# Patient Record
Sex: FEMALE | Race: WHITE | Employment: OTHER | URBAN - METROPOLITAN AREA
[De-identification: names, ages, dates, MRNs, and addresses within clinical notes are randomized per-mention and may not be internally consistent; named-entity substitution may affect disease eponyms.]

---

## 2017-01-01 ENCOUNTER — IP HISTORICAL/CONVERTED ENCOUNTER (OUTPATIENT)
Dept: OTHER | Age: 82
End: 2017-01-01

## 2017-01-01 ENCOUNTER — APPOINTMENT (OUTPATIENT)
Dept: CT IMAGING | Age: 82
DRG: 291 | End: 2017-01-01
Attending: EMERGENCY MEDICINE
Payer: MEDICARE

## 2017-01-01 ENCOUNTER — HOSPITAL ENCOUNTER (INPATIENT)
Age: 82
LOS: 2 days | DRG: 871 | End: 2017-06-30
Attending: EMERGENCY MEDICINE | Admitting: INTERNAL MEDICINE
Payer: MEDICARE

## 2017-01-01 ENCOUNTER — HOSPITAL ENCOUNTER (INPATIENT)
Age: 82
LOS: 3 days | Discharge: HOME HEALTH | DRG: 812 | End: 2017-06-01
Attending: EMERGENCY MEDICINE | Admitting: INTERNAL MEDICINE
Payer: MEDICARE

## 2017-01-01 ENCOUNTER — APPOINTMENT (OUTPATIENT)
Dept: GENERAL RADIOLOGY | Age: 82
DRG: 291 | End: 2017-01-01
Attending: EMERGENCY MEDICINE
Payer: MEDICARE

## 2017-01-01 ENCOUNTER — TELEPHONE (OUTPATIENT)
Dept: CASE MANAGEMENT | Age: 82
End: 2017-01-01

## 2017-01-01 ENCOUNTER — OP HISTORICAL/CONVERTED ENCOUNTER (OUTPATIENT)
Dept: OTHER | Age: 82
End: 2017-01-01

## 2017-01-01 ENCOUNTER — HOSPITAL ENCOUNTER (INPATIENT)
Age: 82
LOS: 2 days | Discharge: HOME | DRG: 291 | End: 2017-03-06
Attending: EMERGENCY MEDICINE | Admitting: INTERNAL MEDICINE
Payer: MEDICARE

## 2017-01-01 ENCOUNTER — APPOINTMENT (OUTPATIENT)
Dept: CT IMAGING | Age: 82
DRG: 871 | End: 2017-01-01
Attending: EMERGENCY MEDICINE
Payer: MEDICARE

## 2017-01-01 ENCOUNTER — APPOINTMENT (OUTPATIENT)
Dept: GENERAL RADIOLOGY | Age: 82
DRG: 871 | End: 2017-01-01
Attending: INTERNAL MEDICINE
Payer: MEDICARE

## 2017-01-01 ENCOUNTER — TELEPHONE (OUTPATIENT)
Dept: ONCOLOGY | Age: 82
End: 2017-01-01

## 2017-01-01 ENCOUNTER — APPOINTMENT (OUTPATIENT)
Dept: ULTRASOUND IMAGING | Age: 82
DRG: 871 | End: 2017-01-01
Attending: INTERNAL MEDICINE
Payer: MEDICARE

## 2017-01-01 ENCOUNTER — APPOINTMENT (OUTPATIENT)
Dept: GENERAL RADIOLOGY | Age: 82
DRG: 871 | End: 2017-01-01
Attending: EMERGENCY MEDICINE
Payer: MEDICARE

## 2017-01-01 ENCOUNTER — APPOINTMENT (OUTPATIENT)
Dept: GENERAL RADIOLOGY | Age: 82
DRG: 812 | End: 2017-01-01
Attending: EMERGENCY MEDICINE
Payer: MEDICARE

## 2017-01-01 VITALS
WEIGHT: 101.1 LBS | OXYGEN SATURATION: 100 % | HEART RATE: 63 BPM | HEIGHT: 60 IN | SYSTOLIC BLOOD PRESSURE: 140 MMHG | RESPIRATION RATE: 17 BRPM | DIASTOLIC BLOOD PRESSURE: 69 MMHG | TEMPERATURE: 97.4 F | BODY MASS INDEX: 19.85 KG/M2

## 2017-01-01 VITALS
WEIGHT: 105 LBS | DIASTOLIC BLOOD PRESSURE: 65 MMHG | HEART RATE: 86 BPM | TEMPERATURE: 97.7 F | RESPIRATION RATE: 18 BRPM | BODY MASS INDEX: 21.17 KG/M2 | SYSTOLIC BLOOD PRESSURE: 136 MMHG | OXYGEN SATURATION: 94 % | HEIGHT: 59 IN

## 2017-01-01 VITALS
HEIGHT: 59 IN | SYSTOLIC BLOOD PRESSURE: 87 MMHG | WEIGHT: 105.82 LBS | HEART RATE: 106 BPM | RESPIRATION RATE: 14 BRPM | OXYGEN SATURATION: 98 % | TEMPERATURE: 97.5 F | DIASTOLIC BLOOD PRESSURE: 45 MMHG | BODY MASS INDEX: 21.33 KG/M2

## 2017-01-01 DIAGNOSIS — E87.1 HYPONATREMIA: ICD-10-CM

## 2017-01-01 DIAGNOSIS — D69.6 THROMBOCYTOPENIA (HCC): ICD-10-CM

## 2017-01-01 DIAGNOSIS — N39.0 URINARY TRACT INFECTION WITHOUT HEMATURIA, SITE UNSPECIFIED: ICD-10-CM

## 2017-01-01 DIAGNOSIS — R06.02 SOB (SHORTNESS OF BREATH): ICD-10-CM

## 2017-01-01 DIAGNOSIS — D64.9 ANEMIA, UNSPECIFIED TYPE: ICD-10-CM

## 2017-01-01 DIAGNOSIS — R65.21 SEPTIC SHOCK (HCC): ICD-10-CM

## 2017-01-01 DIAGNOSIS — A41.9 SEPSIS, DUE TO UNSPECIFIED ORGANISM: Primary | ICD-10-CM

## 2017-01-01 DIAGNOSIS — R53.81 DEBILITY: ICD-10-CM

## 2017-01-01 DIAGNOSIS — A41.9 SEPTIC SHOCK (HCC): ICD-10-CM

## 2017-01-01 DIAGNOSIS — Z71.89 GOALS OF CARE, COUNSELING/DISCUSSION: ICD-10-CM

## 2017-01-01 DIAGNOSIS — N17.9 ACUTE RENAL FAILURE, UNSPECIFIED ACUTE RENAL FAILURE TYPE (HCC): Primary | ICD-10-CM

## 2017-01-01 DIAGNOSIS — I50.9 CONGESTIVE HEART FAILURE, UNSPECIFIED CONGESTIVE HEART FAILURE CHRONICITY, UNSPECIFIED CONGESTIVE HEART FAILURE TYPE: Primary | ICD-10-CM

## 2017-01-01 LAB
ABO + RH BLD: NORMAL
ABO + RH BLD: NORMAL
ALBUMIN SERPL BCP-MCNC: 2.9 G/DL (ref 3.5–5)
ALBUMIN SERPL BCP-MCNC: 3.2 G/DL (ref 3.5–5)
ALBUMIN SERPL BCP-MCNC: 3.3 G/DL (ref 3.5–5)
ALBUMIN SERPL BCP-MCNC: 3.5 G/DL (ref 3.5–5)
ALBUMIN/GLOB SERPL: 0.8 {RATIO} (ref 1.1–2.2)
ALBUMIN/GLOB SERPL: 0.9 {RATIO} (ref 1.1–2.2)
ALBUMIN/GLOB SERPL: 1 {RATIO} (ref 1.1–2.2)
ALBUMIN/GLOB SERPL: 1.1 {RATIO} (ref 1.1–2.2)
ALP SERPL-CCNC: 129 U/L (ref 45–117)
ALP SERPL-CCNC: 234 U/L (ref 45–117)
ALP SERPL-CCNC: 80 U/L (ref 45–117)
ALP SERPL-CCNC: 98 U/L (ref 45–117)
ALT SERPL-CCNC: 144 U/L (ref 12–78)
ALT SERPL-CCNC: 16 U/L (ref 12–78)
ALT SERPL-CCNC: 237 U/L (ref 12–78)
ALT SERPL-CCNC: 58 U/L (ref 12–78)
AMMONIA PLAS-SCNC: 11 UMOL/L
AMORPH CRY URNS QL MICRO: ABNORMAL
ANION GAP BLD CALC-SCNC: 10 MMOL/L (ref 5–15)
ANION GAP BLD CALC-SCNC: 10 MMOL/L (ref 5–15)
ANION GAP BLD CALC-SCNC: 11 MMOL/L (ref 5–15)
ANION GAP BLD CALC-SCNC: 11 MMOL/L (ref 5–15)
ANION GAP BLD CALC-SCNC: 17 MMOL/L (ref 5–15)
ANION GAP BLD CALC-SCNC: 17 MMOL/L (ref 5–15)
ANION GAP BLD CALC-SCNC: 6 MMOL/L (ref 5–15)
ANION GAP BLD CALC-SCNC: 9 MMOL/L (ref 5–15)
ANION GAP BLD CALC-SCNC: 9 MMOL/L (ref 5–15)
APPEARANCE UR: ABNORMAL
APPEARANCE UR: CLEAR
APPEARANCE UR: CLEAR
APTT PPP: 28.9 SEC (ref 22.1–32.5)
APTT PPP: 37.4 SEC (ref 22.1–32.5)
AST SERPL W P-5'-P-CCNC: 146 U/L (ref 15–37)
AST SERPL W P-5'-P-CCNC: 19 U/L (ref 15–37)
AST SERPL W P-5'-P-CCNC: 270 U/L (ref 15–37)
AST SERPL W P-5'-P-CCNC: 69 U/L (ref 15–37)
ATRIAL RATE: 32 BPM
ATRIAL RATE: 63 BPM
ATRIAL RATE: 70 BPM
BACTERIA SPEC CULT: ABNORMAL
BACTERIA SPEC CULT: NORMAL
BACTERIA URNS QL MICRO: ABNORMAL /HPF
BACTERIA URNS QL MICRO: ABNORMAL /HPF
BACTERIA URNS QL MICRO: NEGATIVE /HPF
BASOPHILS # BLD AUTO: 0 K/UL
BASOPHILS # BLD AUTO: 0 K/UL (ref 0–0.1)
BASOPHILS # BLD AUTO: 0.1 K/UL
BASOPHILS # BLD AUTO: 0.1 K/UL (ref 0–0.1)
BASOPHILS # BLD: 0 %
BASOPHILS # BLD: 0 % (ref 0–1)
BASOPHILS # BLD: 1 %
BASOPHILS # BLD: 1 % (ref 0–1)
BILIRUB DIRECT SERPL-MCNC: 0.2 MG/DL (ref 0–0.2)
BILIRUB DIRECT SERPL-MCNC: 1 MG/DL (ref 0–0.2)
BILIRUB SERPL-MCNC: 0.2 MG/DL (ref 0.2–1)
BILIRUB SERPL-MCNC: 0.5 MG/DL (ref 0.2–1)
BILIRUB SERPL-MCNC: 0.9 MG/DL (ref 0.2–1)
BILIRUB SERPL-MCNC: 1.4 MG/DL (ref 0.2–1)
BILIRUB UR QL: NEGATIVE
BLD PROD TYP BPU: NORMAL
BLOOD GROUP ANTIBODIES SERPL: NORMAL
BLOOD GROUP ANTIBODIES SERPL: NORMAL
BNP SERPL-MCNC: 6450 PG/ML (ref 0–450)
BNP SERPL-MCNC: ABNORMAL PG/ML (ref 0–450)
BPU ID: NORMAL
BUN SERPL-MCNC: 28 MG/DL (ref 6–20)
BUN SERPL-MCNC: 30 MG/DL (ref 6–20)
BUN SERPL-MCNC: 30 MG/DL (ref 6–20)
BUN SERPL-MCNC: 60 MG/DL (ref 6–20)
BUN SERPL-MCNC: 61 MG/DL (ref 6–20)
BUN SERPL-MCNC: 70 MG/DL (ref 6–20)
BUN SERPL-MCNC: 74 MG/DL (ref 6–20)
BUN SERPL-MCNC: 75 MG/DL (ref 6–20)
BUN SERPL-MCNC: 77 MG/DL (ref 6–20)
BUN/CREAT SERPL: 14 (ref 12–20)
BUN/CREAT SERPL: 14 (ref 12–20)
BUN/CREAT SERPL: 15 (ref 12–20)
BUN/CREAT SERPL: 20 (ref 12–20)
BUN/CREAT SERPL: 20 (ref 12–20)
BUN/CREAT SERPL: 33 (ref 12–20)
BUN/CREAT SERPL: 35 (ref 12–20)
BUN/CREAT SERPL: 35 (ref 12–20)
BUN/CREAT SERPL: 36 (ref 12–20)
CALCIUM SERPL-MCNC: 8.5 MG/DL (ref 8.5–10.1)
CALCIUM SERPL-MCNC: 8.6 MG/DL (ref 8.5–10.1)
CALCIUM SERPL-MCNC: 8.7 MG/DL (ref 8.5–10.1)
CALCIUM SERPL-MCNC: 8.9 MG/DL (ref 8.5–10.1)
CALCIUM SERPL-MCNC: 9.1 MG/DL (ref 8.5–10.1)
CALCIUM SERPL-MCNC: 9.1 MG/DL (ref 8.5–10.1)
CALCIUM SERPL-MCNC: 9.2 MG/DL (ref 8.5–10.1)
CALCIUM SERPL-MCNC: 9.2 MG/DL (ref 8.5–10.1)
CALCIUM SERPL-MCNC: 9.3 MG/DL (ref 8.5–10.1)
CALCULATED R AXIS, ECG10: -32 DEGREES
CALCULATED R AXIS, ECG10: -34 DEGREES
CALCULATED R AXIS, ECG10: 44 DEGREES
CALCULATED T AXIS, ECG11: 149 DEGREES
CALCULATED T AXIS, ECG11: 161 DEGREES
CALCULATED T AXIS, ECG11: 174 DEGREES
CC UR VC: ABNORMAL
CC UR VC: NORMAL
CHLORIDE SERPL-SCNC: 100 MMOL/L (ref 97–108)
CHLORIDE SERPL-SCNC: 91 MMOL/L (ref 97–108)
CHLORIDE SERPL-SCNC: 92 MMOL/L (ref 97–108)
CHLORIDE SERPL-SCNC: 94 MMOL/L (ref 97–108)
CHLORIDE SERPL-SCNC: 95 MMOL/L (ref 97–108)
CHLORIDE SERPL-SCNC: 96 MMOL/L (ref 97–108)
CHLORIDE SERPL-SCNC: 96 MMOL/L (ref 97–108)
CHLORIDE SERPL-SCNC: 98 MMOL/L (ref 97–108)
CHLORIDE SERPL-SCNC: 99 MMOL/L (ref 97–108)
CK MB CFR SERPL CALC: 11.4 % (ref 0–2.5)
CK MB SERPL-MCNC: 3.2 NG/ML (ref 5–25)
CK SERPL-CCNC: 132 U/L (ref 26–192)
CK SERPL-CCNC: 21 U/L (ref 26–192)
CK SERPL-CCNC: 22 U/L (ref 26–192)
CK SERPL-CCNC: 28 U/L (ref 26–192)
CK SERPL-CCNC: 93 U/L (ref 26–192)
CO2 SERPL-SCNC: 19 MMOL/L (ref 21–32)
CO2 SERPL-SCNC: 20 MMOL/L (ref 21–32)
CO2 SERPL-SCNC: 23 MMOL/L (ref 21–32)
CO2 SERPL-SCNC: 24 MMOL/L (ref 21–32)
CO2 SERPL-SCNC: 27 MMOL/L (ref 21–32)
CO2 SERPL-SCNC: 28 MMOL/L (ref 21–32)
CO2 SERPL-SCNC: 31 MMOL/L (ref 21–32)
COLOR UR: ABNORMAL
COLOR UR: ABNORMAL
COLOR UR: NORMAL
CORTIS AM PEAK SERPL-MCNC: 47.9 UG/DL (ref 4.3–22.4)
CORTIS SERPL-MCNC: 58.8 UG/DL
CREAT SERPL-MCNC: 1.95 MG/DL (ref 0.55–1.02)
CREAT SERPL-MCNC: 1.97 MG/DL (ref 0.55–1.02)
CREAT SERPL-MCNC: 1.98 MG/DL (ref 0.55–1.02)
CREAT SERPL-MCNC: 2.12 MG/DL (ref 0.55–1.02)
CREAT SERPL-MCNC: 2.12 MG/DL (ref 0.55–1.02)
CREAT SERPL-MCNC: 2.13 MG/DL (ref 0.55–1.02)
CREAT SERPL-MCNC: 2.24 MG/DL (ref 0.55–1.02)
CREAT SERPL-MCNC: 3.07 MG/DL (ref 0.55–1.02)
CREAT SERPL-MCNC: 3.1 MG/DL (ref 0.55–1.02)
CREAT UR-MCNC: 118 MG/DL
CREAT UR-MCNC: 37.6 MG/DL
CROSSMATCH RESULT,%XM: NORMAL
DIAGNOSIS, 93000: NORMAL
DIFFERENTIAL METHOD BLD: ABNORMAL
EOSINOPHIL # BLD: 0 K/UL
EOSINOPHIL # BLD: 0 K/UL
EOSINOPHIL # BLD: 0 K/UL (ref 0–0.4)
EOSINOPHIL # BLD: 0.1 K/UL (ref 0–0.4)
EOSINOPHIL NFR BLD: 0 %
EOSINOPHIL NFR BLD: 0 %
EOSINOPHIL NFR BLD: 0 % (ref 0–7)
EOSINOPHIL NFR BLD: 1 % (ref 0–7)
EPITH CASTS URNS QL MICRO: ABNORMAL /LPF
EPITH CASTS URNS QL MICRO: ABNORMAL /LPF
EPITH CASTS URNS QL MICRO: NORMAL /LPF
ERYTHROCYTE [DISTWIDTH] IN BLOOD BY AUTOMATED COUNT: 16.5 % (ref 11.5–14.5)
ERYTHROCYTE [DISTWIDTH] IN BLOOD BY AUTOMATED COUNT: 17 % (ref 11.5–14.5)
ERYTHROCYTE [DISTWIDTH] IN BLOOD BY AUTOMATED COUNT: 17.1 % (ref 11.5–14.5)
ERYTHROCYTE [DISTWIDTH] IN BLOOD BY AUTOMATED COUNT: 17.2 % (ref 11.5–14.5)
ERYTHROCYTE [DISTWIDTH] IN BLOOD BY AUTOMATED COUNT: 17.5 % (ref 11.5–14.5)
ERYTHROCYTE [DISTWIDTH] IN BLOOD BY AUTOMATED COUNT: 18 % (ref 11.5–14.5)
ERYTHROCYTE [DISTWIDTH] IN BLOOD BY AUTOMATED COUNT: 18.3 % (ref 11.5–14.5)
ERYTHROCYTE [DISTWIDTH] IN BLOOD BY AUTOMATED COUNT: 18.5 % (ref 11.5–14.5)
ERYTHROCYTE [DISTWIDTH] IN BLOOD BY AUTOMATED COUNT: 18.7 % (ref 11.5–14.5)
EST. AVERAGE GLUCOSE BLD GHB EST-MCNC: 105 MG/DL
FERRITIN SERPL-MCNC: 130 NG/ML (ref 8–252)
FERRITIN SERPL-MCNC: 68 NG/ML (ref 8–252)
FIBRINOGEN PPP-MCNC: 242 MG/DL (ref 200–475)
FIBRINOGEN PPP-MCNC: 316 MG/DL (ref 200–475)
FOLATE SERPL-MCNC: 8.6 NG/ML (ref 5–21)
GLOBULIN SER CALC-MCNC: 3 G/DL (ref 2–4)
GLOBULIN SER CALC-MCNC: 3.2 G/DL (ref 2–4)
GLOBULIN SER CALC-MCNC: 3.4 G/DL (ref 2–4)
GLOBULIN SER CALC-MCNC: 4.2 G/DL (ref 2–4)
GLUCOSE BLD STRIP.AUTO-MCNC: 104 MG/DL (ref 65–100)
GLUCOSE BLD STRIP.AUTO-MCNC: 142 MG/DL (ref 65–100)
GLUCOSE BLD STRIP.AUTO-MCNC: 41 MG/DL (ref 65–100)
GLUCOSE BLD STRIP.AUTO-MCNC: 45 MG/DL (ref 65–100)
GLUCOSE SERPL-MCNC: 103 MG/DL (ref 65–100)
GLUCOSE SERPL-MCNC: 107 MG/DL (ref 65–100)
GLUCOSE SERPL-MCNC: 130 MG/DL (ref 65–100)
GLUCOSE SERPL-MCNC: 134 MG/DL (ref 65–100)
GLUCOSE SERPL-MCNC: 168 MG/DL (ref 65–100)
GLUCOSE SERPL-MCNC: 223 MG/DL (ref 65–100)
GLUCOSE SERPL-MCNC: 83 MG/DL (ref 65–100)
GLUCOSE SERPL-MCNC: 91 MG/DL (ref 65–100)
GLUCOSE SERPL-MCNC: 93 MG/DL (ref 65–100)
GLUCOSE UR STRIP.AUTO-MCNC: NEGATIVE MG/DL
HAPTOGLOB SERPL-MCNC: 106 MG/DL (ref 34–200)
HAPTOGLOB SERPL-MCNC: 116 MG/DL (ref 30–200)
HBA1C MFR BLD: 5.3 % (ref 4.2–6.3)
HCT VFR BLD AUTO: 20.4 % (ref 35–47)
HCT VFR BLD AUTO: 20.9 % (ref 35–47)
HCT VFR BLD AUTO: 21.2 % (ref 35–47)
HCT VFR BLD AUTO: 24.4 % (ref 35–47)
HCT VFR BLD AUTO: 25.2 % (ref 35–47)
HCT VFR BLD AUTO: 26.7 % (ref 35–47)
HCT VFR BLD AUTO: 26.8 % (ref 35–47)
HCT VFR BLD AUTO: 27 % (ref 35–47)
HCT VFR BLD AUTO: 27.9 % (ref 35–47)
HCV AB SERPL QL IA: NONREACTIVE
HCV COMMENT,HCGAC: NORMAL
HGB BLD-MCNC: 6.5 G/DL (ref 11.5–16)
HGB BLD-MCNC: 6.8 G/DL (ref 11.5–16)
HGB BLD-MCNC: 7.3 G/DL (ref 11.5–16)
HGB BLD-MCNC: 8.1 G/DL (ref 11.5–16)
HGB BLD-MCNC: 8.2 G/DL (ref 11.5–16)
HGB BLD-MCNC: 8.5 G/DL (ref 11.5–16)
HGB BLD-MCNC: 8.7 G/DL (ref 11.5–16)
HGB BLD-MCNC: 8.9 G/DL (ref 11.5–16)
HGB BLD-MCNC: 9.1 G/DL (ref 11.5–16)
HGB UR QL STRIP: ABNORMAL
HGB UR QL STRIP: NEGATIVE
HGB UR QL STRIP: NEGATIVE
INR BLD: 1.2 (ref 0.9–1.2)
INR PPP: 1.2 (ref 0.9–1.1)
INR PPP: 1.8 (ref 0.9–1.1)
IRON SATN MFR SERPL: 20 % (ref 20–50)
IRON SATN MFR SERPL: 49 % (ref 20–50)
IRON SERPL-MCNC: 181 UG/DL (ref 35–150)
IRON SERPL-MCNC: 69 UG/DL (ref 35–150)
KETONES UR QL STRIP.AUTO: NEGATIVE MG/DL
LACTATE SERPL-SCNC: 4.2 MMOL/L (ref 0.4–2)
LACTATE SERPL-SCNC: 5.8 MMOL/L (ref 0.4–2)
LACTATE SERPL-SCNC: 7 MMOL/L (ref 0.4–2)
LDH SERPL L TO P-CCNC: 216 U/L (ref 81–246)
LDH SERPL L TO P-CCNC: 337 U/L (ref 81–246)
LEUKOCYTE ESTERASE UR QL STRIP.AUTO: ABNORMAL
LEUKOCYTE ESTERASE UR QL STRIP.AUTO: NEGATIVE
LEUKOCYTE ESTERASE UR QL STRIP.AUTO: NEGATIVE
LYMPHOCYTES # BLD AUTO: 11 % (ref 12–49)
LYMPHOCYTES # BLD AUTO: 20 % (ref 12–49)
LYMPHOCYTES # BLD AUTO: 24 % (ref 12–49)
LYMPHOCYTES # BLD AUTO: 25 %
LYMPHOCYTES # BLD AUTO: 30 % (ref 12–49)
LYMPHOCYTES # BLD AUTO: 31 % (ref 12–49)
LYMPHOCYTES # BLD AUTO: 4 %
LYMPHOCYTES # BLD AUTO: 8 % (ref 12–49)
LYMPHOCYTES # BLD: 0.2 K/UL
LYMPHOCYTES # BLD: 0.5 K/UL (ref 0.8–3.5)
LYMPHOCYTES # BLD: 0.8 K/UL (ref 0.8–3.5)
LYMPHOCYTES # BLD: 0.9 K/UL (ref 0.8–3.5)
LYMPHOCYTES # BLD: 1 K/UL (ref 0.8–3.5)
LYMPHOCYTES # BLD: 1.3 K/UL
LYMPHOCYTES # BLD: 1.4 K/UL (ref 0.8–3.5)
LYMPHOCYTES # BLD: 1.6 K/UL (ref 0.8–3.5)
MAGNESIUM SERPL-MCNC: 1.9 MG/DL (ref 1.6–2.4)
MAGNESIUM SERPL-MCNC: 2 MG/DL (ref 1.6–2.4)
MAGNESIUM SERPL-MCNC: 2.6 MG/DL (ref 1.6–2.4)
MAGNESIUM SERPL-MCNC: 2.6 MG/DL (ref 1.6–2.4)
MCH RBC QN AUTO: 27.5 PG (ref 26–34)
MCH RBC QN AUTO: 28 PG (ref 26–34)
MCH RBC QN AUTO: 28.2 PG (ref 26–34)
MCH RBC QN AUTO: 28.3 PG (ref 26–34)
MCH RBC QN AUTO: 28.5 PG (ref 26–34)
MCH RBC QN AUTO: 28.6 PG (ref 26–34)
MCH RBC QN AUTO: 29 PG (ref 26–34)
MCH RBC QN AUTO: 29 PG (ref 26–34)
MCH RBC QN AUTO: 29.8 PG (ref 26–34)
MCHC RBC AUTO-ENTMCNC: 31.1 G/DL (ref 30–36.5)
MCHC RBC AUTO-ENTMCNC: 31.5 G/DL (ref 30–36.5)
MCHC RBC AUTO-ENTMCNC: 32.5 G/DL (ref 30–36.5)
MCHC RBC AUTO-ENTMCNC: 32.6 G/DL (ref 30–36.5)
MCHC RBC AUTO-ENTMCNC: 32.6 G/DL (ref 30–36.5)
MCHC RBC AUTO-ENTMCNC: 33.2 G/DL (ref 30–36.5)
MCHC RBC AUTO-ENTMCNC: 33.2 G/DL (ref 30–36.5)
MCHC RBC AUTO-ENTMCNC: 33.3 G/DL (ref 30–36.5)
MCHC RBC AUTO-ENTMCNC: 34.4 G/DL (ref 30–36.5)
MCV RBC AUTO: 84.1 FL (ref 80–99)
MCV RBC AUTO: 85 FL (ref 80–99)
MCV RBC AUTO: 86.9 FL (ref 80–99)
MCV RBC AUTO: 87.3 FL (ref 80–99)
MCV RBC AUTO: 87.4 FL (ref 80–99)
MCV RBC AUTO: 87.5 FL (ref 80–99)
MCV RBC AUTO: 87.8 FL (ref 80–99)
MCV RBC AUTO: 89.5 FL (ref 80–99)
MCV RBC AUTO: 90.1 FL (ref 80–99)
MONOCYTES # BLD: 0.4 K/UL
MONOCYTES # BLD: 0.7 K/UL
MONOCYTES # BLD: 0.7 K/UL (ref 0–1)
MONOCYTES # BLD: 0.8 K/UL (ref 0–1)
MONOCYTES # BLD: 0.9 K/UL (ref 0–1)
MONOCYTES # BLD: 1.1 K/UL (ref 0–1)
MONOCYTES NFR BLD AUTO: 12 % (ref 5–13)
MONOCYTES NFR BLD AUTO: 13 %
MONOCYTES NFR BLD AUTO: 15 % (ref 5–13)
MONOCYTES NFR BLD AUTO: 15 % (ref 5–13)
MONOCYTES NFR BLD AUTO: 19 % (ref 5–13)
MONOCYTES NFR BLD AUTO: 20 % (ref 5–13)
MONOCYTES NFR BLD AUTO: 21 % (ref 5–13)
MONOCYTES NFR BLD AUTO: 8 %
NEUTS BAND NFR BLD MANUAL: 1 %
NEUTS BAND NFR BLD MANUAL: 2 % (ref 0–6)
NEUTS SEG # BLD: 2.4 K/UL (ref 1.8–8)
NEUTS SEG # BLD: 2.4 K/UL (ref 1.8–8)
NEUTS SEG # BLD: 2.6 K/UL (ref 1.8–8)
NEUTS SEG # BLD: 2.7 K/UL (ref 1.8–8)
NEUTS SEG # BLD: 3.1 K/UL
NEUTS SEG # BLD: 4.8 K/UL (ref 1.8–8)
NEUTS SEG # BLD: 4.9 K/UL
NEUTS SEG # BLD: 5.5 K/UL (ref 1.8–8)
NEUTS SEG NFR BLD AUTO: 49 % (ref 32–75)
NEUTS SEG NFR BLD AUTO: 53 % (ref 32–75)
NEUTS SEG NFR BLD AUTO: 56 % (ref 32–75)
NEUTS SEG NFR BLD AUTO: 57 % (ref 32–75)
NEUTS SEG NFR BLD AUTO: 61 %
NEUTS SEG NFR BLD AUTO: 76 % (ref 32–75)
NEUTS SEG NFR BLD AUTO: 77 % (ref 32–75)
NEUTS SEG NFR BLD AUTO: 87 %
NITRITE UR QL STRIP.AUTO: NEGATIVE
NITRITE UR QL STRIP.AUTO: NEGATIVE
NITRITE UR QL STRIP.AUTO: POSITIVE
NRBC BLD-RTO: 1 PER 100 WBC
NRBC BLD-RTO: 3 PER 100 WBC
P-R INTERVAL, ECG05: 224 MS
P-R INTERVAL, ECG05: 284 MS
PATH REV BLD -IMP: ABNORMAL
PERIPHERAL SMEAR,PSM: NORMAL
PH UR STRIP: 5.5 [PH] (ref 5–8)
PH UR STRIP: 6 [PH] (ref 5–8)
PH UR STRIP: 6.5 [PH] (ref 5–8)
PHOSPHATE SERPL-MCNC: 4.5 MG/DL (ref 2.6–4.7)
PLATELET # BLD AUTO: 109 K/UL (ref 150–400)
PLATELET # BLD AUTO: 139 K/UL (ref 150–400)
PLATELET # BLD AUTO: 140 K/UL (ref 150–400)
PLATELET # BLD AUTO: 145 K/UL (ref 150–400)
PLATELET # BLD AUTO: 23 K/UL (ref 150–400)
PLATELET # BLD AUTO: 29 K/UL (ref 150–400)
PLATELET # BLD AUTO: 69 K/UL (ref 150–400)
PLATELET # BLD AUTO: 77 K/UL (ref 150–400)
PLATELET # BLD AUTO: 79 K/UL (ref 150–400)
PLATELET COMMENTS,PCOM: ABNORMAL
POTASSIUM SERPL-SCNC: 3.7 MMOL/L (ref 3.5–5.1)
POTASSIUM SERPL-SCNC: 3.9 MMOL/L (ref 3.5–5.1)
POTASSIUM SERPL-SCNC: 4 MMOL/L (ref 3.5–5.1)
POTASSIUM SERPL-SCNC: 4.1 MMOL/L (ref 3.5–5.1)
POTASSIUM SERPL-SCNC: 4.1 MMOL/L (ref 3.5–5.1)
POTASSIUM SERPL-SCNC: 4.3 MMOL/L (ref 3.5–5.1)
POTASSIUM SERPL-SCNC: 4.3 MMOL/L (ref 3.5–5.1)
POTASSIUM SERPL-SCNC: 4.6 MMOL/L (ref 3.5–5.1)
POTASSIUM SERPL-SCNC: 4.9 MMOL/L (ref 3.5–5.1)
PROT SERPL-MCNC: 6.1 G/DL (ref 6.4–8.2)
PROT SERPL-MCNC: 6.2 G/DL (ref 6.4–8.2)
PROT SERPL-MCNC: 6.9 G/DL (ref 6.4–8.2)
PROT SERPL-MCNC: 7.5 G/DL (ref 6.4–8.2)
PROT UR STRIP-MCNC: 100 MG/DL
PROT UR STRIP-MCNC: >300 MG/DL
PROT UR STRIP-MCNC: NEGATIVE MG/DL
PROTHROMBIN TIME: 12 SEC (ref 9–11.1)
PROTHROMBIN TIME: 17.8 SEC (ref 9–11.1)
Q-T INTERVAL, ECG07: 422 MS
Q-T INTERVAL, ECG07: 508 MS
Q-T INTERVAL, ECG07: 524 MS
QRS DURATION, ECG06: 124 MS
QRS DURATION, ECG06: 160 MS
QRS DURATION, ECG06: 174 MS
QTC CALCULATION (BEZET), ECG08: 448 MS
QTC CALCULATION (BEZET), ECG08: 519 MS
QTC CALCULATION (BEZET), ECG08: 524 MS
RBC # BLD AUTO: 2.28 M/UL (ref 3.8–5.2)
RBC # BLD AUTO: 2.32 M/UL (ref 3.8–5.2)
RBC # BLD AUTO: 2.52 M/UL (ref 3.8–5.2)
RBC # BLD AUTO: 2.87 M/UL (ref 3.8–5.2)
RBC # BLD AUTO: 2.9 M/UL (ref 3.8–5.2)
RBC # BLD AUTO: 3.04 M/UL (ref 3.8–5.2)
RBC # BLD AUTO: 3.07 M/UL (ref 3.8–5.2)
RBC # BLD AUTO: 3.09 M/UL (ref 3.8–5.2)
RBC # BLD AUTO: 3.19 M/UL (ref 3.8–5.2)
RBC #/AREA URNS HPF: ABNORMAL /HPF (ref 0–5)
RBC #/AREA URNS HPF: ABNORMAL /HPF (ref 0–5)
RBC #/AREA URNS HPF: NORMAL /HPF (ref 0–5)
RBC MORPH BLD: ABNORMAL
RETICS/RBC NFR AUTO: 2.8 % (ref 0.7–2.1)
SERVICE CMNT-IMP: ABNORMAL
SERVICE CMNT-IMP: NORMAL
SODIUM SERPL-SCNC: 127 MMOL/L (ref 136–145)
SODIUM SERPL-SCNC: 128 MMOL/L (ref 136–145)
SODIUM SERPL-SCNC: 130 MMOL/L (ref 136–145)
SODIUM SERPL-SCNC: 130 MMOL/L (ref 136–145)
SODIUM SERPL-SCNC: 131 MMOL/L (ref 136–145)
SODIUM SERPL-SCNC: 133 MMOL/L (ref 136–145)
SODIUM SERPL-SCNC: 133 MMOL/L (ref 136–145)
SODIUM SERPL-SCNC: 134 MMOL/L (ref 136–145)
SODIUM SERPL-SCNC: 135 MMOL/L (ref 136–145)
SODIUM UR-SCNC: 17 MMOL/L
SODIUM UR-SCNC: 19 MMOL/L
SP GR UR REFRACTOMETRY: 1.01 (ref 1–1.03)
SP GR UR REFRACTOMETRY: 1.01 (ref 1–1.03)
SP GR UR REFRACTOMETRY: 1.02 (ref 1–1.03)
SPECIMEN EXP DATE BLD: NORMAL
SPECIMEN EXP DATE BLD: NORMAL
STATUS OF UNIT,%ST: NORMAL
THERAPEUTIC RANGE,PTTT: ABNORMAL SECS (ref 58–77)
THERAPEUTIC RANGE,PTTT: NORMAL SECS (ref 58–77)
TIBC SERPL-MCNC: 341 UG/DL (ref 250–450)
TIBC SERPL-MCNC: 368 UG/DL (ref 250–450)
TROPONIN I SERPL-MCNC: 0.16 NG/ML
TROPONIN I SERPL-MCNC: 0.17 NG/ML
TROPONIN I SERPL-MCNC: 0.25 NG/ML
TROPONIN I SERPL-MCNC: 0.32 NG/ML
TROPONIN I SERPL-MCNC: 0.33 NG/ML
TSH SERPL DL<=0.05 MIU/L-ACNC: 1.63 UIU/ML (ref 0.36–3.74)
TSH SERPL DL<=0.05 MIU/L-ACNC: 2.61 UIU/ML (ref 0.36–3.74)
UA: UC IF INDICATED,UAUC: ABNORMAL
UA: UC IF INDICATED,UAUC: ABNORMAL
UNIT DIVISION, %UDIV: 0
UROBILINOGEN UR QL STRIP.AUTO: 0.2 EU/DL (ref 0.2–1)
VENTRICULAR RATE, ECG03: 60 BPM
VENTRICULAR RATE, ECG03: 63 BPM
VENTRICULAR RATE, ECG03: 68 BPM
VIT B12 SERPL-MCNC: 1635 PG/ML (ref 211–911)
WBC # BLD AUTO: 3.4 K/UL (ref 3.6–11)
WBC # BLD AUTO: 4.2 K/UL (ref 3.6–11)
WBC # BLD AUTO: 4.5 K/UL (ref 3.6–11)
WBC # BLD AUTO: 4.6 K/UL (ref 3.6–11)
WBC # BLD AUTO: 5.2 K/UL (ref 3.6–11)
WBC # BLD AUTO: 5.4 K/UL (ref 3.6–11)
WBC # BLD AUTO: 5.5 K/UL (ref 3.6–11)
WBC # BLD AUTO: 6.2 K/UL (ref 3.6–11)
WBC # BLD AUTO: 7.3 K/UL (ref 3.6–11)
WBC URNS QL MICRO: >100 /HPF (ref 0–4)
WBC URNS QL MICRO: ABNORMAL /HPF (ref 0–4)
WBC URNS QL MICRO: NORMAL /HPF (ref 0–4)

## 2017-01-01 PROCEDURE — 85025 COMPLETE CBC W/AUTO DIFF WBC: CPT | Performed by: INTERNAL MEDICINE

## 2017-01-01 PROCEDURE — 83615 LACTATE (LD) (LDH) ENZYME: CPT | Performed by: INTERNAL MEDICINE

## 2017-01-01 PROCEDURE — 73030 X-RAY EXAM OF SHOULDER: CPT

## 2017-01-01 PROCEDURE — 77030011943

## 2017-01-01 PROCEDURE — 86900 BLOOD TYPING SEROLOGIC ABO: CPT | Performed by: INTERNAL MEDICINE

## 2017-01-01 PROCEDURE — 74011000258 HC RX REV CODE- 258: Performed by: INTERNAL MEDICINE

## 2017-01-01 PROCEDURE — 93005 ELECTROCARDIOGRAM TRACING: CPT

## 2017-01-01 PROCEDURE — 81001 URINALYSIS AUTO W/SCOPE: CPT | Performed by: EMERGENCY MEDICINE

## 2017-01-01 PROCEDURE — 36430 TRANSFUSION BLD/BLD COMPNT: CPT

## 2017-01-01 PROCEDURE — 65660000000 HC RM CCU STEPDOWN

## 2017-01-01 PROCEDURE — 83010 ASSAY OF HAPTOGLOBIN QUANT: CPT | Performed by: INTERNAL MEDICINE

## 2017-01-01 PROCEDURE — 86920 COMPATIBILITY TEST SPIN: CPT | Performed by: EMERGENCY MEDICINE

## 2017-01-01 PROCEDURE — 85384 FIBRINOGEN ACTIVITY: CPT | Performed by: NURSE PRACTITIONER

## 2017-01-01 PROCEDURE — 99285 EMERGENCY DEPT VISIT HI MDM: CPT

## 2017-01-01 PROCEDURE — P9016 RBC LEUKOCYTES REDUCED: HCPCS | Performed by: EMERGENCY MEDICINE

## 2017-01-01 PROCEDURE — 84100 ASSAY OF PHOSPHORUS: CPT | Performed by: INTERNAL MEDICINE

## 2017-01-01 PROCEDURE — 83735 ASSAY OF MAGNESIUM: CPT | Performed by: INTERNAL MEDICINE

## 2017-01-01 PROCEDURE — 80048 BASIC METABOLIC PNL TOTAL CA: CPT | Performed by: INTERNAL MEDICINE

## 2017-01-01 PROCEDURE — 36415 COLL VENOUS BLD VENIPUNCTURE: CPT | Performed by: INTERNAL MEDICINE

## 2017-01-01 PROCEDURE — 77010033678 HC OXYGEN DAILY

## 2017-01-01 PROCEDURE — 83605 ASSAY OF LACTIC ACID: CPT | Performed by: INTERNAL MEDICINE

## 2017-01-01 PROCEDURE — 85610 PROTHROMBIN TIME: CPT | Performed by: EMERGENCY MEDICINE

## 2017-01-01 PROCEDURE — 97165 OT EVAL LOW COMPLEX 30 MIN: CPT | Performed by: OCCUPATIONAL THERAPIST

## 2017-01-01 PROCEDURE — 77030034848

## 2017-01-01 PROCEDURE — 77030010545

## 2017-01-01 PROCEDURE — 87040 BLOOD CULTURE FOR BACTERIA: CPT | Performed by: EMERGENCY MEDICINE

## 2017-01-01 PROCEDURE — 83880 ASSAY OF NATRIURETIC PEPTIDE: CPT | Performed by: INTERNAL MEDICINE

## 2017-01-01 PROCEDURE — 97530 THERAPEUTIC ACTIVITIES: CPT

## 2017-01-01 PROCEDURE — 74011250637 HC RX REV CODE- 250/637: Performed by: INTERNAL MEDICINE

## 2017-01-01 PROCEDURE — 82533 TOTAL CORTISOL: CPT | Performed by: INTERNAL MEDICINE

## 2017-01-01 PROCEDURE — 80076 HEPATIC FUNCTION PANEL: CPT | Performed by: INTERNAL MEDICINE

## 2017-01-01 PROCEDURE — 71010 XR CHEST PORT: CPT

## 2017-01-01 PROCEDURE — 97161 PT EVAL LOW COMPLEX 20 MIN: CPT

## 2017-01-01 PROCEDURE — 76450000000

## 2017-01-01 PROCEDURE — 85610 PROTHROMBIN TIME: CPT

## 2017-01-01 PROCEDURE — 74011000258 HC RX REV CODE- 258: Performed by: EMERGENCY MEDICINE

## 2017-01-01 PROCEDURE — 86923 COMPATIBILITY TEST ELECTRIC: CPT | Performed by: INTERNAL MEDICINE

## 2017-01-01 PROCEDURE — 97163 PT EVAL HIGH COMPLEX 45 MIN: CPT

## 2017-01-01 PROCEDURE — 77030029131 HC ADMN ST IV BLD N DEHP ICUM -B

## 2017-01-01 PROCEDURE — 82550 ASSAY OF CK (CPK): CPT | Performed by: INTERNAL MEDICINE

## 2017-01-01 PROCEDURE — 74011000250 HC RX REV CODE- 250: Performed by: INTERNAL MEDICINE

## 2017-01-01 PROCEDURE — P9016 RBC LEUKOCYTES REDUCED: HCPCS | Performed by: INTERNAL MEDICINE

## 2017-01-01 PROCEDURE — 82570 ASSAY OF URINE CREATININE: CPT | Performed by: INTERNAL MEDICINE

## 2017-01-01 PROCEDURE — 93306 TTE W/DOPPLER COMPLETE: CPT

## 2017-01-01 PROCEDURE — 74011250636 HC RX REV CODE- 250/636: Performed by: INTERNAL MEDICINE

## 2017-01-01 PROCEDURE — 96365 THER/PROPH/DIAG IV INF INIT: CPT

## 2017-01-01 PROCEDURE — 85027 COMPLETE CBC AUTOMATED: CPT | Performed by: INTERNAL MEDICINE

## 2017-01-01 PROCEDURE — 80053 COMPREHEN METABOLIC PANEL: CPT | Performed by: EMERGENCY MEDICINE

## 2017-01-01 PROCEDURE — 82728 ASSAY OF FERRITIN: CPT | Performed by: INTERNAL MEDICINE

## 2017-01-01 PROCEDURE — 82607 VITAMIN B-12: CPT | Performed by: INTERNAL MEDICINE

## 2017-01-01 PROCEDURE — 84443 ASSAY THYROID STIM HORMONE: CPT | Performed by: INTERNAL MEDICINE

## 2017-01-01 PROCEDURE — 30233N1 TRANSFUSION OF NONAUTOLOGOUS RED BLOOD CELLS INTO PERIPHERAL VEIN, PERCUTANEOUS APPROACH: ICD-10-PCS | Performed by: INTERNAL MEDICINE

## 2017-01-01 PROCEDURE — 87086 URINE CULTURE/COLONY COUNT: CPT | Performed by: EMERGENCY MEDICINE

## 2017-01-01 PROCEDURE — 77030027138 HC INCENT SPIROMETER -A

## 2017-01-01 PROCEDURE — 86803 HEPATITIS C AB TEST: CPT | Performed by: NURSE PRACTITIONER

## 2017-01-01 PROCEDURE — 97116 GAIT TRAINING THERAPY: CPT

## 2017-01-01 PROCEDURE — 36415 COLL VENOUS BLD VENIPUNCTURE: CPT | Performed by: EMERGENCY MEDICINE

## 2017-01-01 PROCEDURE — 83540 ASSAY OF IRON: CPT | Performed by: INTERNAL MEDICINE

## 2017-01-01 PROCEDURE — 94762 N-INVAS EAR/PLS OXIMTRY CONT: CPT

## 2017-01-01 PROCEDURE — 74011250636 HC RX REV CODE- 250/636: Performed by: FAMILY MEDICINE

## 2017-01-01 PROCEDURE — 97535 SELF CARE MNGMENT TRAINING: CPT | Performed by: OCCUPATIONAL THERAPIST

## 2017-01-01 PROCEDURE — 85025 COMPLETE CBC W/AUTO DIFF WBC: CPT | Performed by: EMERGENCY MEDICINE

## 2017-01-01 PROCEDURE — 84484 ASSAY OF TROPONIN QUANT: CPT | Performed by: INTERNAL MEDICINE

## 2017-01-01 PROCEDURE — 96374 THER/PROPH/DIAG INJ IV PUSH: CPT

## 2017-01-01 PROCEDURE — 74011250636 HC RX REV CODE- 250/636: Performed by: EMERGENCY MEDICINE

## 2017-01-01 PROCEDURE — 87186 SC STD MICRODIL/AGAR DIL: CPT | Performed by: EMERGENCY MEDICINE

## 2017-01-01 PROCEDURE — 76700 US EXAM ABDOM COMPLETE: CPT

## 2017-01-01 PROCEDURE — 51702 INSERT TEMP BLADDER CATH: CPT

## 2017-01-01 PROCEDURE — 82140 ASSAY OF AMMONIA: CPT | Performed by: INTERNAL MEDICINE

## 2017-01-01 PROCEDURE — 84300 ASSAY OF URINE SODIUM: CPT | Performed by: INTERNAL MEDICINE

## 2017-01-01 PROCEDURE — 84484 ASSAY OF TROPONIN QUANT: CPT | Performed by: EMERGENCY MEDICINE

## 2017-01-01 PROCEDURE — C1758 CATHETER, URETERAL: HCPCS

## 2017-01-01 PROCEDURE — 82962 GLUCOSE BLOOD TEST: CPT

## 2017-01-01 PROCEDURE — 85730 THROMBOPLASTIN TIME PARTIAL: CPT | Performed by: EMERGENCY MEDICINE

## 2017-01-01 PROCEDURE — 99283 EMERGENCY DEPT VISIT LOW MDM: CPT

## 2017-01-01 PROCEDURE — 80048 BASIC METABOLIC PNL TOTAL CA: CPT | Performed by: EMERGENCY MEDICINE

## 2017-01-01 PROCEDURE — 83036 HEMOGLOBIN GLYCOSYLATED A1C: CPT | Performed by: INTERNAL MEDICINE

## 2017-01-01 PROCEDURE — 85384 FIBRINOGEN ACTIVITY: CPT | Performed by: INTERNAL MEDICINE

## 2017-01-01 PROCEDURE — 87077 CULTURE AEROBIC IDENTIFY: CPT | Performed by: EMERGENCY MEDICINE

## 2017-01-01 PROCEDURE — 86900 BLOOD TYPING SEROLOGIC ABO: CPT | Performed by: EMERGENCY MEDICINE

## 2017-01-01 PROCEDURE — 83605 ASSAY OF LACTIC ACID: CPT | Performed by: EMERGENCY MEDICINE

## 2017-01-01 PROCEDURE — 85730 THROMBOPLASTIN TIME PARTIAL: CPT | Performed by: NURSE PRACTITIONER

## 2017-01-01 PROCEDURE — C9113 INJ PANTOPRAZOLE SODIUM, VIA: HCPCS | Performed by: INTERNAL MEDICINE

## 2017-01-01 PROCEDURE — 85610 PROTHROMBIN TIME: CPT | Performed by: NURSE PRACTITIONER

## 2017-01-01 PROCEDURE — 82746 ASSAY OF FOLIC ACID SERUM: CPT | Performed by: INTERNAL MEDICINE

## 2017-01-01 PROCEDURE — 85045 AUTOMATED RETICULOCYTE COUNT: CPT | Performed by: INTERNAL MEDICINE

## 2017-01-01 PROCEDURE — 70450 CT HEAD/BRAIN W/O DYE: CPT

## 2017-01-01 PROCEDURE — 77030032490 HC SLV COMPR SCD KNE COVD -B

## 2017-01-01 PROCEDURE — 51798 US URINE CAPACITY MEASURE: CPT

## 2017-01-01 PROCEDURE — 74011000250 HC RX REV CODE- 250: Performed by: FAMILY MEDICINE

## 2017-01-01 PROCEDURE — 94660 CPAP INITIATION&MGMT: CPT

## 2017-01-01 RX ORDER — CEFDINIR 300 MG/1
300 CAPSULE ORAL 2 TIMES DAILY
Qty: 6 CAP | Refills: 0 | Status: SHIPPED | OUTPATIENT
Start: 2017-01-01 | End: 2017-01-01

## 2017-01-01 RX ORDER — SIMVASTATIN 5 MG/1
10 TABLET, FILM COATED ORAL
Status: DISCONTINUED | OUTPATIENT
Start: 2017-01-01 | End: 2017-01-01 | Stop reason: HOSPADM

## 2017-01-01 RX ORDER — SODIUM CHLORIDE 9 MG/ML
75 INJECTION, SOLUTION INTRAVENOUS CONTINUOUS
Status: DISCONTINUED | OUTPATIENT
Start: 2017-01-01 | End: 2017-01-01

## 2017-01-01 RX ORDER — FUROSEMIDE 10 MG/ML
20 INJECTION INTRAMUSCULAR; INTRAVENOUS 2 TIMES DAILY
Status: DISCONTINUED | OUTPATIENT
Start: 2017-01-01 | End: 2017-01-01

## 2017-01-01 RX ORDER — MESALAMINE 400 MG/1
400 CAPSULE, DELAYED RELEASE ORAL
Status: DISCONTINUED | OUTPATIENT
Start: 2017-01-01 | End: 2017-01-01 | Stop reason: HOSPADM

## 2017-01-01 RX ORDER — SODIUM CHLORIDE 0.9 % (FLUSH) 0.9 %
5-10 SYRINGE (ML) INJECTION AS NEEDED
Status: DISCONTINUED | OUTPATIENT
Start: 2017-01-01 | End: 2017-01-01 | Stop reason: HOSPADM

## 2017-01-01 RX ORDER — METOPROLOL SUCCINATE 50 MG/1
50 TABLET, EXTENDED RELEASE ORAL DAILY
Status: DISCONTINUED | OUTPATIENT
Start: 2017-01-01 | End: 2017-01-01

## 2017-01-01 RX ORDER — MESALAMINE 400 MG/1
400 CAPSULE, DELAYED RELEASE ORAL
COMMUNITY

## 2017-01-01 RX ORDER — GLYCOPYRROLATE 0.2 MG/ML
0.2 INJECTION INTRAMUSCULAR; INTRAVENOUS
Status: COMPLETED | OUTPATIENT
Start: 2017-01-01 | End: 2017-01-01

## 2017-01-01 RX ORDER — DEXTROSE 50 % IN WATER (D50W) INTRAVENOUS SYRINGE
12.5-25 AS NEEDED
Status: DISCONTINUED | OUTPATIENT
Start: 2017-01-01 | End: 2017-01-01 | Stop reason: HOSPADM

## 2017-01-01 RX ORDER — NITROFURANTOIN MACROCRYSTALS 50 MG/1
50 CAPSULE ORAL 2 TIMES DAILY
COMMUNITY
Start: 2017-01-01 | End: 2017-07-05

## 2017-01-01 RX ORDER — MORPHINE SULFATE 2 MG/ML
2 INJECTION, SOLUTION INTRAMUSCULAR; INTRAVENOUS
Status: COMPLETED | OUTPATIENT
Start: 2017-01-01 | End: 2017-01-01

## 2017-01-01 RX ORDER — DILTIAZEM HYDROCHLORIDE 120 MG/1
120 CAPSULE, COATED, EXTENDED RELEASE ORAL DAILY
Status: DISCONTINUED | OUTPATIENT
Start: 2017-01-01 | End: 2017-01-01

## 2017-01-01 RX ORDER — MELATONIN
1000 DAILY
COMMUNITY

## 2017-01-01 RX ORDER — TRIAMCINOLONE ACETONIDE 1 MG/ML
LOTION TOPICAL 3 TIMES DAILY
Status: ON HOLD | COMMUNITY
End: 2017-01-01

## 2017-01-01 RX ORDER — DILTIAZEM HYDROCHLORIDE 120 MG/1
120 CAPSULE, COATED, EXTENDED RELEASE ORAL DAILY
Status: DISCONTINUED | OUTPATIENT
Start: 2017-01-01 | End: 2017-01-01 | Stop reason: HOSPADM

## 2017-01-01 RX ORDER — DOXEPIN HYDROCHLORIDE 25 MG/1
25 CAPSULE ORAL
Status: DISCONTINUED | OUTPATIENT
Start: 2017-01-01 | End: 2017-01-01 | Stop reason: HOSPADM

## 2017-01-01 RX ORDER — HALOPERIDOL 5 MG/ML
2 INJECTION INTRAMUSCULAR
Status: DISCONTINUED | OUTPATIENT
Start: 2017-01-01 | End: 2017-01-01 | Stop reason: HOSPADM

## 2017-01-01 RX ORDER — PROCHLORPERAZINE EDISYLATE 5 MG/ML
10 INJECTION INTRAMUSCULAR; INTRAVENOUS
Status: DISCONTINUED | OUTPATIENT
Start: 2017-01-01 | End: 2017-01-01 | Stop reason: HOSPADM

## 2017-01-01 RX ORDER — SOLIFENACIN SUCCINATE 5 MG/1
5 TABLET, FILM COATED ORAL
Status: DISCONTINUED | OUTPATIENT
Start: 2017-01-01 | End: 2017-01-01 | Stop reason: HOSPADM

## 2017-01-01 RX ORDER — SPIRONOLACTONE 25 MG/1
25 TABLET ORAL
COMMUNITY

## 2017-01-01 RX ORDER — METHENAMINE HIPPURATE 1000 MG/1
1 TABLET ORAL 2 TIMES DAILY WITH MEALS
COMMUNITY

## 2017-01-01 RX ORDER — SODIUM CHLORIDE 0.9 % (FLUSH) 0.9 %
5-10 SYRINGE (ML) INJECTION EVERY 8 HOURS
Status: DISCONTINUED | OUTPATIENT
Start: 2017-01-01 | End: 2017-01-01 | Stop reason: HOSPADM

## 2017-01-01 RX ORDER — NALOXONE HYDROCHLORIDE 0.4 MG/ML
0.4 INJECTION, SOLUTION INTRAMUSCULAR; INTRAVENOUS; SUBCUTANEOUS AS NEEDED
Status: DISCONTINUED | OUTPATIENT
Start: 2017-01-01 | End: 2017-01-01 | Stop reason: HOSPADM

## 2017-01-01 RX ORDER — SODIUM CHLORIDE 9 MG/ML
250 INJECTION, SOLUTION INTRAVENOUS AS NEEDED
Status: DISCONTINUED | OUTPATIENT
Start: 2017-01-01 | End: 2017-01-01

## 2017-01-01 RX ORDER — HALOPERIDOL 2 MG/ML
2 SOLUTION ORAL
Status: DISCONTINUED | OUTPATIENT
Start: 2017-01-01 | End: 2017-01-01 | Stop reason: HOSPADM

## 2017-01-01 RX ORDER — POLYETHYLENE GLYCOL 3350 17 G/17G
17 POWDER, FOR SOLUTION ORAL
COMMUNITY

## 2017-01-01 RX ORDER — METOPROLOL SUCCINATE 50 MG/1
50 TABLET, EXTENDED RELEASE ORAL DAILY
COMMUNITY
End: 2017-01-01

## 2017-01-01 RX ORDER — DOXEPIN HYDROCHLORIDE 25 MG/1
25 CAPSULE ORAL
COMMUNITY

## 2017-01-01 RX ORDER — AMOXICILLIN 250 MG
1 CAPSULE ORAL DAILY
Status: DISCONTINUED | OUTPATIENT
Start: 2017-01-01 | End: 2017-01-01 | Stop reason: HOSPADM

## 2017-01-01 RX ORDER — FUROSEMIDE 40 MG/1
40 TABLET ORAL DAILY
COMMUNITY

## 2017-01-01 RX ORDER — DILTIAZEM HYDROCHLORIDE EXTENDED-RELEASE TABLETS 240 MG/1
1 TABLET, EXTENDED RELEASE ORAL
COMMUNITY
End: 2017-01-01

## 2017-01-01 RX ORDER — ONDANSETRON 2 MG/ML
4 INJECTION INTRAMUSCULAR; INTRAVENOUS
Status: DISCONTINUED | OUTPATIENT
Start: 2017-01-01 | End: 2017-01-01 | Stop reason: HOSPADM

## 2017-01-01 RX ORDER — SPIRONOLACTONE 25 MG/1
12.5 TABLET ORAL AS NEEDED
COMMUNITY
End: 2017-01-01

## 2017-01-01 RX ORDER — ESOMEPRAZOLE MAGNESIUM 40 MG/1
40 CAPSULE, DELAYED RELEASE ORAL
COMMUNITY

## 2017-01-01 RX ORDER — CARBOXYMETHYLCELLULOSE SODIUM 10 MG/ML
1 GEL OPHTHALMIC AS NEEDED
COMMUNITY

## 2017-01-01 RX ORDER — SODIUM CHLORIDE 9 MG/ML
250 INJECTION, SOLUTION INTRAVENOUS AS NEEDED
Status: DISCONTINUED | OUTPATIENT
Start: 2017-01-01 | End: 2017-01-01 | Stop reason: HOSPADM

## 2017-01-01 RX ORDER — LANOLIN ALCOHOL/MO/W.PET/CERES
325 CREAM (GRAM) TOPICAL
Status: DISCONTINUED | OUTPATIENT
Start: 2017-01-01 | End: 2017-01-01 | Stop reason: HOSPADM

## 2017-01-01 RX ORDER — DILTIAZEM HYDROCHLORIDE 60 MG/1
120 TABLET, FILM COATED ORAL DAILY
Status: DISCONTINUED | OUTPATIENT
Start: 2017-01-01 | End: 2017-01-01

## 2017-01-01 RX ORDER — HYDROMORPHONE HYDROCHLORIDE 1 MG/ML
0.5 INJECTION, SOLUTION INTRAMUSCULAR; INTRAVENOUS; SUBCUTANEOUS
Status: DISCONTINUED | OUTPATIENT
Start: 2017-01-01 | End: 2017-01-01

## 2017-01-01 RX ORDER — LORAZEPAM 2 MG/ML
1 INJECTION INTRAMUSCULAR
Status: DISCONTINUED | OUTPATIENT
Start: 2017-01-01 | End: 2017-01-01 | Stop reason: HOSPADM

## 2017-01-01 RX ORDER — GLYCOPYRROLATE 0.2 MG/ML
0.2 INJECTION INTRAMUSCULAR; INTRAVENOUS
Status: DISCONTINUED | OUTPATIENT
Start: 2017-01-01 | End: 2017-01-01 | Stop reason: HOSPADM

## 2017-01-01 RX ORDER — ZOLPIDEM TARTRATE 5 MG/1
5 TABLET ORAL
Status: ON HOLD | COMMUNITY
End: 2017-01-01

## 2017-01-01 RX ORDER — DILTIAZEM HYDROCHLORIDE 240 MG/1
240 CAPSULE, COATED, EXTENDED RELEASE ORAL
Status: DISCONTINUED | OUTPATIENT
Start: 2017-01-01 | End: 2017-01-01 | Stop reason: HOSPADM

## 2017-01-01 RX ORDER — DIPHENHYDRAMINE HYDROCHLORIDE 50 MG/ML
12.5 INJECTION, SOLUTION INTRAMUSCULAR; INTRAVENOUS
Status: DISCONTINUED | OUTPATIENT
Start: 2017-01-01 | End: 2017-01-01 | Stop reason: HOSPADM

## 2017-01-01 RX ORDER — DOCUSATE SODIUM 100 MG/1
200 CAPSULE, LIQUID FILLED ORAL
Status: DISCONTINUED | OUTPATIENT
Start: 2017-01-01 | End: 2017-01-01 | Stop reason: HOSPADM

## 2017-01-01 RX ORDER — ACETAMINOPHEN 325 MG/1
650 TABLET ORAL
Status: DISCONTINUED | OUTPATIENT
Start: 2017-01-01 | End: 2017-01-01 | Stop reason: HOSPADM

## 2017-01-01 RX ORDER — FENTANYL CITRATE 50 UG/ML
25 INJECTION, SOLUTION INTRAMUSCULAR; INTRAVENOUS
Status: DISCONTINUED | OUTPATIENT
Start: 2017-01-01 | End: 2017-01-01 | Stop reason: HOSPADM

## 2017-01-01 RX ORDER — PANTOPRAZOLE SODIUM 40 MG/1
40 TABLET, DELAYED RELEASE ORAL
Status: DISCONTINUED | OUTPATIENT
Start: 2017-01-01 | End: 2017-01-01 | Stop reason: HOSPADM

## 2017-01-01 RX ORDER — ZOLPIDEM TARTRATE 5 MG/1
5 TABLET ORAL
Status: DISCONTINUED | OUTPATIENT
Start: 2017-01-01 | End: 2017-01-01 | Stop reason: HOSPADM

## 2017-01-01 RX ORDER — MAGNESIUM SULFATE 100 %
4 CRYSTALS MISCELLANEOUS AS NEEDED
Status: DISCONTINUED | OUTPATIENT
Start: 2017-01-01 | End: 2017-01-01 | Stop reason: HOSPADM

## 2017-01-01 RX ORDER — KETOROLAC TROMETHAMINE 30 MG/ML
15 INJECTION, SOLUTION INTRAMUSCULAR; INTRAVENOUS
Status: DISCONTINUED | OUTPATIENT
Start: 2017-01-01 | End: 2017-01-01 | Stop reason: HOSPADM

## 2017-01-01 RX ORDER — HYDROCODONE BITARTRATE AND ACETAMINOPHEN 5; 325 MG/1; MG/1
1 TABLET ORAL
Status: DISCONTINUED | OUTPATIENT
Start: 2017-01-01 | End: 2017-01-01 | Stop reason: HOSPADM

## 2017-01-01 RX ORDER — MICONAZOLE NITRATE 2 %
2 CREAM WITH APPLICATOR VAGINAL DAILY
COMMUNITY

## 2017-01-01 RX ORDER — FUROSEMIDE 10 MG/ML
40 INJECTION INTRAMUSCULAR; INTRAVENOUS ONCE
Status: COMPLETED | OUTPATIENT
Start: 2017-01-01 | End: 2017-01-01

## 2017-01-01 RX ORDER — MORPHINE SULFATE 2 MG/ML
1 INJECTION, SOLUTION INTRAMUSCULAR; INTRAVENOUS
Status: DISCONTINUED | OUTPATIENT
Start: 2017-01-01 | End: 2017-01-01 | Stop reason: HOSPADM

## 2017-01-01 RX ORDER — METOPROLOL SUCCINATE 50 MG/1
50 TABLET, EXTENDED RELEASE ORAL DAILY
Status: DISCONTINUED | OUTPATIENT
Start: 2017-01-01 | End: 2017-01-01 | Stop reason: HOSPADM

## 2017-01-01 RX ORDER — DEXTROSE MONOHYDRATE AND SODIUM CHLORIDE 5; .9 G/100ML; G/100ML
75 INJECTION, SOLUTION INTRAVENOUS CONTINUOUS
Status: DISCONTINUED | OUTPATIENT
Start: 2017-01-01 | End: 2017-01-01

## 2017-01-01 RX ORDER — SULFAMETHOXAZOLE AND TRIMETHOPRIM 800; 160 MG/1; MG/1
1 TABLET ORAL 2 TIMES DAILY
COMMUNITY
Start: 2017-01-01 | End: 2017-01-01

## 2017-01-01 RX ORDER — DOXYCYCLINE HYCLATE 100 MG
100 TABLET ORAL 2 TIMES DAILY WITH MEALS
Qty: 10 TAB | Refills: 0 | Status: SHIPPED | OUTPATIENT
Start: 2017-01-01 | End: 2017-01-01

## 2017-01-01 RX ORDER — ACETAMINOPHEN 500 MG
1000 TABLET ORAL
COMMUNITY

## 2017-01-01 RX ORDER — DOCUSATE SODIUM 100 MG/1
100 CAPSULE, LIQUID FILLED ORAL 2 TIMES DAILY
Status: DISCONTINUED | OUTPATIENT
Start: 2017-01-01 | End: 2017-01-01

## 2017-01-01 RX ORDER — CLOPIDOGREL BISULFATE 75 MG/1
75 TABLET ORAL DAILY
COMMUNITY
End: 2017-01-01

## 2017-01-01 RX ORDER — LORAZEPAM 2 MG/ML
1 INJECTION INTRAMUSCULAR
Status: COMPLETED | OUTPATIENT
Start: 2017-01-01 | End: 2017-01-01

## 2017-01-01 RX ORDER — INSULIN LISPRO 100 [IU]/ML
INJECTION, SOLUTION INTRAVENOUS; SUBCUTANEOUS EVERY 6 HOURS
Status: DISCONTINUED | OUTPATIENT
Start: 2017-01-01 | End: 2017-01-01

## 2017-01-01 RX ORDER — LANOLIN ALCOHOL/MO/W.PET/CERES
325 CREAM (GRAM) TOPICAL
Qty: 30 TAB | Refills: 0 | Status: ON HOLD | OUTPATIENT
Start: 2017-01-01 | End: 2017-01-01

## 2017-01-01 RX ORDER — CLOPIDOGREL BISULFATE 75 MG/1
75 TABLET ORAL DAILY
Status: DISCONTINUED | OUTPATIENT
Start: 2017-01-01 | End: 2017-01-01

## 2017-01-01 RX ORDER — FUROSEMIDE 10 MG/ML
40 INJECTION INTRAMUSCULAR; INTRAVENOUS 2 TIMES DAILY
Status: DISCONTINUED | OUTPATIENT
Start: 2017-01-01 | End: 2017-01-01 | Stop reason: HOSPADM

## 2017-01-01 RX ORDER — SCOLOPAMINE TRANSDERMAL SYSTEM 1 MG/1
1.5 PATCH, EXTENDED RELEASE TRANSDERMAL
Status: DISCONTINUED | OUTPATIENT
Start: 2017-01-01 | End: 2017-01-01 | Stop reason: HOSPADM

## 2017-01-01 RX ORDER — SIMVASTATIN 10 MG/1
10 TABLET, FILM COATED ORAL
COMMUNITY

## 2017-01-01 RX ORDER — LANOLIN ALCOHOL/MO/W.PET/CERES
400 CREAM (GRAM) TOPICAL DAILY
COMMUNITY

## 2017-01-01 RX ORDER — DOXYCYCLINE HYCLATE 100 MG
100 TABLET ORAL 2 TIMES DAILY WITH MEALS
Status: DISCONTINUED | OUTPATIENT
Start: 2017-01-01 | End: 2017-01-01 | Stop reason: HOSPADM

## 2017-01-01 RX ORDER — SOLIFENACIN SUCCINATE 5 MG/1
5 TABLET, FILM COATED ORAL
Status: ON HOLD | COMMUNITY
End: 2017-01-01

## 2017-01-01 RX ORDER — DOXYCYCLINE 100 MG/1
100 TABLET ORAL 2 TIMES DAILY
Status: ON HOLD | COMMUNITY
End: 2017-01-01

## 2017-01-01 RX ORDER — SPIRONOLACTONE 25 MG/1
25 TABLET ORAL DAILY
COMMUNITY
End: 2017-01-01

## 2017-01-01 RX ORDER — CHOLECALCIFEROL (VITAMIN D3) 125 MCG
5 CAPSULE ORAL
COMMUNITY

## 2017-01-01 RX ORDER — DILTIAZEM HYDROCHLORIDE 120 MG/1
120 TABLET, FILM COATED ORAL DAILY
Status: ON HOLD | COMMUNITY
End: 2017-01-01 | Stop reason: CLARIF

## 2017-01-01 RX ORDER — LANOLIN ALCOHOL/MO/W.PET/CERES
500 CREAM (GRAM) TOPICAL DAILY
COMMUNITY

## 2017-01-01 RX ORDER — ACETAMINOPHEN 325 MG/1
650 TABLET ORAL ONCE
Status: COMPLETED | OUTPATIENT
Start: 2017-01-01 | End: 2017-01-01

## 2017-01-01 RX ORDER — DILTIAZEM HYDROCHLORIDE 120 MG/1
120 CAPSULE, COATED, EXTENDED RELEASE ORAL EVERY EVENING
COMMUNITY

## 2017-01-01 RX ORDER — HYDROCORTISONE SODIUM SUCCINATE 100 MG/2ML
100 INJECTION, POWDER, FOR SOLUTION INTRAMUSCULAR; INTRAVENOUS EVERY 8 HOURS
Status: DISCONTINUED | OUTPATIENT
Start: 2017-01-01 | End: 2017-01-01

## 2017-01-01 RX ADMIN — DOCUSATE SODIUM 200 MG: 100 CAPSULE ORAL at 22:05

## 2017-01-01 RX ADMIN — Medication 10 ML: at 05:58

## 2017-01-01 RX ADMIN — SOLIFENACIN SUCCINATE 5 MG: 5 TABLET, FILM COATED ORAL at 22:06

## 2017-01-01 RX ADMIN — Medication 10 ML: at 22:00

## 2017-01-01 RX ADMIN — FUROSEMIDE 40 MG: 10 INJECTION, SOLUTION INTRAMUSCULAR; INTRAVENOUS at 18:42

## 2017-01-01 RX ADMIN — DOXEPIN HYDROCHLORIDE 25 MG: 25 CAPSULE ORAL at 22:11

## 2017-01-01 RX ADMIN — SIMVASTATIN 10 MG: 5 TABLET, FILM COATED ORAL at 21:52

## 2017-01-01 RX ADMIN — CLOPIDOGREL 75 MG: 75 TABLET, FILM COATED ORAL at 08:05

## 2017-01-01 RX ADMIN — DOCUSATE SODIUM -SENNOSIDES 1 TABLET: 50; 8.6 TABLET, COATED ORAL at 09:04

## 2017-01-01 RX ADMIN — DOXYCYCLINE HYCLATE 100 MG: 100 TABLET, COATED ORAL at 08:35

## 2017-01-01 RX ADMIN — DOXYCYCLINE HYCLATE 100 MG: 100 TABLET, COATED ORAL at 21:38

## 2017-01-01 RX ADMIN — PANTOPRAZOLE SODIUM 40 MG: 40 TABLET, DELAYED RELEASE ORAL at 05:59

## 2017-01-01 RX ADMIN — IRON SUCROSE 100 MG: 20 INJECTION, SOLUTION INTRAVENOUS at 22:06

## 2017-01-01 RX ADMIN — DOXEPIN HYDROCHLORIDE 25 MG: 25 CAPSULE ORAL at 20:29

## 2017-01-01 RX ADMIN — Medication 1 MG: at 23:46

## 2017-01-01 RX ADMIN — DOXYCYCLINE HYCLATE 100 MG: 100 TABLET, COATED ORAL at 08:55

## 2017-01-01 RX ADMIN — DOXEPIN HYDROCHLORIDE 25 MG: 25 CAPSULE ORAL at 21:52

## 2017-01-01 RX ADMIN — FERROUS SULFATE TAB 325 MG (65 MG ELEMENTAL FE) 325 MG: 325 (65 FE) TAB at 05:59

## 2017-01-01 RX ADMIN — LORAZEPAM 1 MG: 2 INJECTION, SOLUTION INTRAMUSCULAR; INTRAVENOUS at 15:10

## 2017-01-01 RX ADMIN — DOCUSATE SODIUM 100 MG: 100 CAPSULE ORAL at 22:11

## 2017-01-01 RX ADMIN — CLOPIDOGREL 75 MG: 75 TABLET, FILM COATED ORAL at 09:04

## 2017-01-01 RX ADMIN — HYDROCORTISONE SODIUM SUCCINATE 100 MG: 100 INJECTION, POWDER, FOR SOLUTION INTRAMUSCULAR; INTRAVENOUS at 06:26

## 2017-01-01 RX ADMIN — DEXTROSE MONOHYDRATE AND SODIUM CHLORIDE 75 ML/HR: 5; .9 INJECTION, SOLUTION INTRAVENOUS at 06:48

## 2017-01-01 RX ADMIN — ACETAMINOPHEN 650 MG: 325 TABLET ORAL at 09:25

## 2017-01-01 RX ADMIN — SIMVASTATIN 10 MG: 5 TABLET, FILM COATED ORAL at 22:11

## 2017-01-01 RX ADMIN — PANTOPRAZOLE SODIUM 40 MG: 40 TABLET, DELAYED RELEASE ORAL at 08:55

## 2017-01-01 RX ADMIN — DILTIAZEM HYDROCHLORIDE 240 MG: 240 CAPSULE, EXTENDED RELEASE ORAL at 22:06

## 2017-01-01 RX ADMIN — Medication 10 ML: at 06:00

## 2017-01-01 RX ADMIN — Medication 10 ML: at 06:38

## 2017-01-01 RX ADMIN — HYDROCODONE BITARTRATE AND ACETAMINOPHEN 1 TABLET: 5; 325 TABLET ORAL at 14:03

## 2017-01-01 RX ADMIN — FERROUS SULFATE TAB 325 MG (65 MG ELEMENTAL FE) 325 MG: 325 (65 FE) TAB at 07:30

## 2017-01-01 RX ADMIN — SIMVASTATIN 10 MG: 5 TABLET, FILM COATED ORAL at 22:06

## 2017-01-01 RX ADMIN — DEXTRAN 70, AND HYPROMELLOSE 2910 1 DROP: 1; 3 SOLUTION/ DROPS OPHTHALMIC at 19:00

## 2017-01-01 RX ADMIN — SIMVASTATIN 10 MG: 5 TABLET, FILM COATED ORAL at 20:28

## 2017-01-01 RX ADMIN — GLYCOPYRROLATE 0.2 MG: 0.2 INJECTION, SOLUTION INTRAMUSCULAR; INTRAVENOUS at 15:10

## 2017-01-01 RX ADMIN — FUROSEMIDE 40 MG: 10 INJECTION, SOLUTION INTRAMUSCULAR; INTRAVENOUS at 15:49

## 2017-01-01 RX ADMIN — Medication 2 MG: at 15:10

## 2017-01-01 RX ADMIN — DOXEPIN HYDROCHLORIDE 25 MG: 25 CAPSULE ORAL at 21:54

## 2017-01-01 RX ADMIN — FUROSEMIDE 40 MG: 10 INJECTION, SOLUTION INTRAMUSCULAR; INTRAVENOUS at 08:38

## 2017-01-01 RX ADMIN — PIPERACILLIN SODIUM,TAZOBACTAM SODIUM 3.38 G: 3; .375 INJECTION, POWDER, FOR SOLUTION INTRAVENOUS at 09:06

## 2017-01-01 RX ADMIN — SODIUM CHLORIDE 40 MG: 9 INJECTION, SOLUTION INTRAMUSCULAR; INTRAVENOUS; SUBCUTANEOUS at 09:07

## 2017-01-01 RX ADMIN — DOCUSATE SODIUM -SENNOSIDES 1 TABLET: 50; 8.6 TABLET, COATED ORAL at 08:05

## 2017-01-01 RX ADMIN — PIPERACILLIN SODIUM,TAZOBACTAM SODIUM 3.38 G: 3; .375 INJECTION, POWDER, FOR SOLUTION INTRAVENOUS at 16:44

## 2017-01-01 RX ADMIN — Medication 10 ML: at 05:03

## 2017-01-01 RX ADMIN — SOLIFENACIN SUCCINATE 5 MG: 5 TABLET, FILM COATED ORAL at 21:52

## 2017-01-01 RX ADMIN — CEFTRIAXONE SODIUM 1 G: 1 INJECTION, POWDER, FOR SOLUTION INTRAMUSCULAR; INTRAVENOUS at 15:20

## 2017-01-01 RX ADMIN — METOPROLOL SUCCINATE 50 MG: 50 TABLET, FILM COATED, EXTENDED RELEASE ORAL at 08:35

## 2017-01-01 RX ADMIN — HYDROCODONE BITARTRATE AND ACETAMINOPHEN 1 TABLET: 5; 325 TABLET ORAL at 19:15

## 2017-01-01 RX ADMIN — Medication 10 ML: at 15:20

## 2017-01-01 RX ADMIN — DOXEPIN HYDROCHLORIDE 25 MG: 25 CAPSULE ORAL at 22:06

## 2017-01-01 RX ADMIN — DOCUSATE SODIUM -SENNOSIDES 1 TABLET: 50; 8.6 TABLET, COATED ORAL at 09:36

## 2017-01-01 RX ADMIN — SODIUM CHLORIDE 500 ML: 900 INJECTION, SOLUTION INTRAVENOUS at 17:53

## 2017-01-01 RX ADMIN — IRON SUCROSE 100 MG: 20 INJECTION, SOLUTION INTRAVENOUS at 21:54

## 2017-01-01 RX ADMIN — DILTIAZEM HYDROCHLORIDE 120 MG: 120 CAPSULE, COATED, EXTENDED RELEASE ORAL at 09:36

## 2017-01-01 RX ADMIN — METOPROLOL SUCCINATE 50 MG: 50 TABLET, FILM COATED, EXTENDED RELEASE ORAL at 09:04

## 2017-01-01 RX ADMIN — DILTIAZEM HYDROCHLORIDE 240 MG: 240 CAPSULE, EXTENDED RELEASE ORAL at 23:24

## 2017-01-01 RX ADMIN — Medication 10 ML: at 22:11

## 2017-01-01 RX ADMIN — DOCUSATE SODIUM -SENNOSIDES 1 TABLET: 50; 8.6 TABLET, COATED ORAL at 08:35

## 2017-01-01 RX ADMIN — Medication 1 MG: at 09:07

## 2017-01-01 RX ADMIN — SODIUM CHLORIDE 250 ML: 900 INJECTION, SOLUTION INTRAVENOUS at 16:42

## 2017-01-01 RX ADMIN — Medication 10 ML: at 16:32

## 2017-01-01 RX ADMIN — Medication 10 ML: at 14:49

## 2017-01-01 RX ADMIN — DEXTROSE MONOHYDRATE 25 G: 25 INJECTION, SOLUTION INTRAVENOUS at 06:23

## 2017-01-01 RX ADMIN — Medication 10 ML: at 21:55

## 2017-01-01 RX ADMIN — PANTOPRAZOLE SODIUM 40 MG: 40 TABLET, DELAYED RELEASE ORAL at 07:30

## 2017-01-01 RX ADMIN — SOLIFENACIN SUCCINATE 5 MG: 5 TABLET, FILM COATED ORAL at 20:29

## 2017-01-01 RX ADMIN — Medication 10 ML: at 21:53

## 2017-01-01 RX ADMIN — VANCOMYCIN HYDROCHLORIDE 750 MG: 1 INJECTION, POWDER, LYOPHILIZED, FOR SOLUTION INTRAVENOUS at 18:53

## 2017-01-01 RX ADMIN — PANTOPRAZOLE SODIUM 40 MG: 40 TABLET, DELAYED RELEASE ORAL at 06:30

## 2017-01-01 RX ADMIN — FUROSEMIDE 40 MG: 10 INJECTION, SOLUTION INTRAMUSCULAR; INTRAVENOUS at 08:54

## 2017-01-01 RX ADMIN — SODIUM CHLORIDE 1000 ML: 900 INJECTION, SOLUTION INTRAVENOUS at 16:12

## 2017-01-01 RX ADMIN — Medication 1 MG: at 14:00

## 2017-01-01 RX ADMIN — DILTIAZEM HYDROCHLORIDE 120 MG: 120 CAPSULE, COATED, EXTENDED RELEASE ORAL at 09:04

## 2017-01-01 RX ADMIN — SOLIFENACIN SUCCINATE 5 MG: 5 TABLET, FILM COATED ORAL at 22:11

## 2017-01-01 RX ADMIN — DOXYCYCLINE HYCLATE 100 MG: 100 TABLET, COATED ORAL at 16:29

## 2017-01-01 RX ADMIN — Medication 10 ML: at 15:10

## 2017-01-01 RX ADMIN — PANTOPRAZOLE SODIUM 40 MG: 40 TABLET, DELAYED RELEASE ORAL at 08:35

## 2017-01-01 RX ADMIN — SODIUM CHLORIDE 125 ML/HR: 900 INJECTION, SOLUTION INTRAVENOUS at 17:52

## 2017-01-01 RX ADMIN — CEFTRIAXONE SODIUM 1 G: 1 INJECTION, POWDER, FOR SOLUTION INTRAMUSCULAR; INTRAVENOUS at 14:35

## 2017-01-01 RX ADMIN — SODIUM CHLORIDE 75 ML/HR: 900 INJECTION, SOLUTION INTRAVENOUS at 22:04

## 2017-01-01 RX ADMIN — Medication 5 ML: at 16:22

## 2017-01-01 RX ADMIN — FERROUS SULFATE TAB 325 MG (65 MG ELEMENTAL FE) 325 MG: 325 (65 FE) TAB at 06:30

## 2017-01-01 RX ADMIN — CEFTRIAXONE SODIUM 1 G: 1 INJECTION, POWDER, FOR SOLUTION INTRAMUSCULAR; INTRAVENOUS at 14:48

## 2017-01-01 RX ADMIN — METOPROLOL SUCCINATE 50 MG: 50 TABLET, FILM COATED, EXTENDED RELEASE ORAL at 08:55

## 2017-03-04 PROBLEM — J81.0 ACUTE PULMONARY EDEMA (HCC): Status: ACTIVE | Noted: 2017-01-01

## 2017-03-04 PROBLEM — N17.9 ARF (ACUTE RENAL FAILURE) (HCC): Status: ACTIVE | Noted: 2017-01-01

## 2017-03-04 PROBLEM — J96.00 ACUTE RESPIRATORY FAILURE (HCC): Status: ACTIVE | Noted: 2017-01-01

## 2017-03-04 PROBLEM — R77.8 TROPONIN LEVEL ELEVATED: Status: ACTIVE | Noted: 2017-01-01

## 2017-03-04 PROBLEM — D64.9 ANEMIA: Status: ACTIVE | Noted: 2017-01-01

## 2017-03-04 NOTE — H&P
[facility]  [address] [facility], [facility] 19  [phone]    Admission History and Physical      NAME:  Angel Luis Carrillo   :   3/2/1924   MRN:  [id number]     PCP:  [facility] On File [facility]     Date/Time:  3/4/2017         Subjective:     CHIEF COMPLAINT: \"I'm so weak\"     HISTORY OF PRESENT ILLNESS:     Ms. Claudia Suárez is a 80 y.o.  female with PMH of HTN, CAD and a-fib admitted for CHF exacerbation and weakness. Per son, over the last 2-3 days has noted progressive weakness and difficulty with ambulation due to this. Upon arrival to the ED pt without complaints of SOB but found to have an x-ray showing congestive changes and an elevated proBNP and troponin prompting admission. She was given 40mg IV lasix at [facility] ED prior to transfer. Denies CP or SOB currently. No recent fevers/chills. Does have a cough productive of yellow sputum. No edema. No melanotic or bloody stools noted     Past Medical History:   Diagnosis Date    Hypertension         Past Surgical History:   Procedure Laterality Date    HX PACEMAKER PLACEMENT         Social History   Substance Use Topics    Smoking status: Never Smoker    Smokeless tobacco: Not on file    Alcohol use No        History reviewed. No pertinent family history. Allergies   Allergen Reactions    Zocor [Simvastatin] Other (comments)     Leg pains. Prior to Admission medications    Medication Sig Start Date End Date Taking? Authorizing Provider   mesalamine (DELZICOL) 400 mg cdti capsule Take  by mouth three (3) times daily. Yes Debra Martinez MD   furosemide (LASIX) 40 mg tablet Take  by mouth daily. Yes Debra Martinez MD   ceramides 1,3,6-11 (CERAVE) lotn by Apply Externally route. Yes Debra Martinez MD   diltiazem hcl 240 mg Tb24 Take  by mouth. Yes Debra Martinez MD   trimethoprim-sulfamethoxazole (BACTRIM DS, SEPTRA DS) 160-800 mg per tablet Take 1 Tab by mouth two (2) times a day.    Yes Debra Martinez MD triamcinolone (KENALOG) 0.1 % lotion Apply  to affected area three (3) times daily. use thin layer   Yes Debra Martinez MD   clopidogrel (PLAVIX) 75 mg tab Take  by mouth. Yes Debra Martinez MD   metoprolol succinate (TOPROL XL) 50 mg XL tablet Take  by mouth daily. Yes Debra Martinez MD   methenamine hippurate (HIPREX) 1 gram tablet Take 1 g by mouth two (2) times daily (with meals). Yes Debra Martinez MD   esomeprazole (NEXIUM) 40 mg capsule Take  by mouth daily. Yes Debra Martinez MD   doxepin (SINEQUAN) 25 mg capsule Take  by mouth nightly. Yes Debra Martinez MD   simvastatin (ZOCOR) 10 mg tablet Take  by mouth nightly. Yes Debra Martinez MD   solifenacin (VESICARE) 5 mg tablet Take 5 mg by mouth daily. Yes Debra Martinez MD   docusate sodium (COLACE) 100 mg capsule Take 100 mg by mouth two (2) times a day. Yes Historical Provider         Review of Systems:  (bold if positive, if negative)    Gen:  Eyes:  ENT:  CVS:  Pulm:  GI:    :    MS:  Skin:  Psych:  Endo:    Hem:  Renal:    Neuro:     Cough, sputum, weakness       Objective:      VITALS:    Vital signs reviewed; most recent are:    Visit Vitals    /60    Pulse 60    Temp 97.7 °F (36.5 °C)    Resp 18    Ht 5' (1.524 m)    Wt 47.6 kg (105 lb)    SpO2 98%    BMI 20.51 kg/m2     SpO2 Readings from Last 6 Encounters:   03/04/17 98%    O2 Flow Rate (L/min): 4 l/min     Intake/Output Summary (Last 24 hours) at 03/04/17 1850  Last data filed at 03/04/17 1640   Gross per 24 hour   Intake                0 ml   Output              325 ml   Net             -325 ml            Exam:     Physical Exam:  Gen: Thin elderly female in NAD   HEENT:  Pink conjunctivae, PERRL, hearing intact to voice, moist mucous membranes  Neck:  Supple, without masses, thyroid non-tender  Resp: Bibasilar crackles.  No wheezing   Card:  No murmurs, normal S1, S2 without thrills, bruits or peripheral edema  Abd:  Soft, non-tender, non-distended, normoactive bowel sounds are present, no palpable organomegaly  Lymph:  No cervical adenopathy  Musc:  No cyanosis or clubbing  Skin:  No rashes or ulcers, skin turgor is good  Neuro: Mild diffuse weakness. Multiple areas of ecchymosis   Psych:  Alert with good insight. Oriented to person, place, and time     Labs:    Recent Labs      03/04/17   1337   WBC  5.2   HGB  8.2*   HCT  25.2*   PLT  145*     Recent Labs      03/04/17   1337   NA  131*   K  4.0   CL  94*   CO2  31   GLU  103*   BUN  30*   CREA  2.12*   CA  9.3   ALB  3.3*   SGOT  19   ALT  16     No components found for: GLPOC  No results for input(s): PH, PCO2, PO2, HCO3, FIO2 in the last 72 hours. No results for input(s): INR in the last 72 hours. No lab exists for component: INREXT    CXR =>   Small right pleural effusion. Questionable pulmonary edema.         proBNP => 6450     Trop => 0.17 to 0.16     Assessment/Plan:    Acute pulmonary edema (HCC) (3/4/2017)/acute hypoxic respiratory failure - mild  -trend CE's   -strict I's and O's   -daily weights   -check TTE   -cardiology eval   -start IV lasix     ?acute bronchitis - pt with productive cough. No fevers/chills, WBC count to suggest PNA   -start doxy       CAD (coronary artery disease), native coronary artery (2/4/2011) - currently without chest pain   -trend CE's as above   -continue metoprolol  -hold plavix due to acute drop in hemoglobin       AF (atrial fibrillation) ([facility] [address] (2/4/2011)   -continue metoprolol and dilt once med rec completed       Troponin level elevated (3/4/2017) - mild elevation. Possibly 2/2 renal dysfunction or demand ischemia for anemia  -continue to trend CE's   -TTE pending   -cardiology eval as above       Anemia (3/4/2017) - acute.  Unclear etiology   -type and screen in the event of transfusion   -hold plavix   -check stool blood   -check ferritin and iron panel       ARF (acute renal failure) (HCC) (3/4/2017) v. CKD   -monitor with diuresis   -check urine lytes    Weakness - likely 2/2 anemia   -PT/OT eval     Surrogate decision maker: Son; pt is DNR     Total time spent with patient: [address] discussed with: Patient and Family    Discussed:  Code status, care plan     Prophylaxis:  SCD's    Probable Disposition:  Home w/Family           ___________________________________________________    Attending Physician: Florencio Arthur MD

## 2017-03-04 NOTE — ED TRIAGE NOTES
Patient brought to ED treatment area via personal wheelchair for complaint of \"since yesterday she has become more and more weak. She started to use her walker and then she got too weak for that so she has been using a wheelchair. \" Patient's son reports that patient usually able to walk and move on her own. Son mentions that patient was recently checked for UTI and placed on antibiotic in case but have not heard results of lab work yet.

## 2017-03-04 NOTE — ED NOTES
Assisted pt on bedpan. Pt is able to bend her legs and prop her hips up in the air without difficulty.

## 2017-03-04 NOTE — ED NOTES
Time out: report to [facility]. Pt, son, and [facility] aware of pt being transferred to [facility] Room 319. Pt in stable condition. Pt transferred on cardiac monitor x 4 and O2 at 4L NC. EMTALA sent with pt. Son took all pt's belongings with him except pt's shoes and pants.

## 2017-03-04 NOTE — ED NOTES
Pt appears in NAD. Pt sitting up in the bed, remains on cardiac monitor x 4. Currently on 4 L NC with sat of 95%. Son at bedside.  Pt and son agree to admission to [facility]

## 2017-03-04 NOTE — ED NOTES
Pt 's O2 sat continues to drop to 87% with 3L NC with pt in supine position. Pt in NAD. Pt placed in upright position.  Dr. Vaishali Dockery at bedside to re eval. sats now 92% on 4l nc

## 2017-03-04 NOTE — IP AVS SNAPSHOT
Current Discharge Medication List  
  
Take these medications at their scheduled times Dose & Instructions Dispensing Information Comments Morning Noon Evening Bedtime  
 clopidogrel 75 mg Tab Commonly known as:  PLAVIX Your next dose is: Today, Tomorrow Other:  ____________ Dose:  75 mg Take 75 mg by mouth daily. Refills:  0  
     
   
   
   
  
 COLACE 100 mg capsule Generic drug:  docusate sodium Your next dose is: Today, Tomorrow Other:  ____________ Dose:  200 mg Take 200 mg by mouth nightly. Refills:  0  
     
   
   
   
  
 diltiazem hcl 240 mg Tb24 Your next dose is: Today, Tomorrow Other:  ____________ Dose:  1 Tab Take 1 Tab by mouth nightly. Refills:  0  
     
   
   
   
  
 doxepin 25 mg capsule Commonly known as:  SINEquan Your next dose is: Today, Tomorrow Other:  ____________ Dose:  25 mg Take 25 mg by mouth nightly. Refills:  0  
     
   
   
   
  
 doxycycline 100 mg tablet Commonly known as:  VIBRA-TABS Your next dose is: Today, Tomorrow Other:  ____________ Dose:  100 mg Take 1 Tab by mouth two (2) times daily (with meals) for 5 days. Quantity:  10 Tab Refills:  0  
     
   
   
   
  
 ferrous sulfate 325 mg (65 mg iron) tablet Your next dose is: Today, Tomorrow Other:  ____________ Dose:  325 mg Take 1 Tab by mouth Daily (before breakfast). Quantity:  30 Tab Refills:  0  
     
   
   
   
  
 furosemide 40 mg tablet Commonly known as:  LASIX Your next dose is: Today, Tomorrow Other:  ____________ Dose:  40 mg Take 40 mg by mouth daily. Refills:  0  
     
   
   
   
  
 methenamine hippurate 1 gram tablet Commonly known as:  HIPREX Your next dose is: Today, Tomorrow Other:  ____________ Dose:  1 g Take 1 g by mouth two (2) times daily (with meals). Refills:  0 NexIUM 40 mg capsule Generic drug:  esomeprazole Your next dose is: Today, Tomorrow Other:  ____________ Dose:  40 mg Take 40 mg by mouth Daily (before breakfast). Refills:  0  
     
   
   
   
  
 simvastatin 10 mg tablet Commonly known as:  ZOCOR Your next dose is: Today, Tomorrow Other:  ____________ Dose:  10 mg Take 10 mg by mouth nightly. Refills:  0  
     
   
   
   
  
 TOPROL XL 50 mg XL tablet Generic drug:  metoprolol succinate Your next dose is: Today, Tomorrow Other:  ____________ Dose:  50 mg Take 50 mg by mouth daily. Refills:  0  
     
   
   
   
  
 VESIcare 5 mg tablet Generic drug:  solifenacin Your next dose is: Today, Tomorrow Other:  ____________ Dose:  5 mg Take 5 mg by mouth nightly. Refills:  0 Take these medications as needed Dose & Instructions Dispensing Information Comments Morning Noon Evening Bedtime Jamal Melara Generic drug:  ceramides 1,3,6-11 Your next dose is: Today, Tomorrow Other:  ____________  
   
   
 by Apply Externally route daily as needed (dry skin). Refills:  0 DELZICOL 400 mg Cdti capsule Generic drug:  mesalamine Your next dose is: Today, Tomorrow Other:  ____________ Dose:  400 mg Take 400 mg by mouth three (3) times daily as needed (Constipation). Refills:  0 Where to Get Your Medications Information about where to get these medications is not yet available ! Ask your nurse or doctor about these medications  
  doxycycline 100 mg tablet  
 ferrous sulfate 325 mg (65 mg iron) tablet

## 2017-03-04 NOTE — PROGRESS NOTES
TRANSFER - IN REPORT:    Verbal report received from Adwoa Nino RN (name) on Forrestine Rucker  being received from [facility] ED (unit) for routine progression of care      Report consisted of patients Situation, Background, Assessment and   Recommendations(SBAR). Information from the following report(s) SBAR, Kardex, [facility] and Cardiac Rhythm A-Paced was reviewed with the receiving nurse. Opportunity for questions and clarification was provided. Assessment completed upon patients arrival to unit and care assumed. Bedside and Verbal shift change report given to Macarena Preston (oncoming nurse) by Aydin Aguilar (offgoing nurse). Report included the following information SBAR, Kardex, MAR and Cardiac Rhythm A-paced.

## 2017-03-04 NOTE — PROGRESS NOTES
Primary Nurse Georgina Osorio and Krissy Casillas RN performed a dual skin assessment on this patient Impairment noted- see wound doc flow sheet. Generalized bruises to BLE, BUE, Neck, and right hip. Redness to sacrum, blanchable. No open wounds.    Tolu score is 21

## 2017-03-04 NOTE — ED NOTES
Pt voided per bedpan. pt assisted with lifting of her hips and turning over. O2 sats dropped to 88%, increased O2 to 3 L NC, sats are 91% currently on 3 L NC. Updated Dr. Mary Santamaria Son at bedside.  Pt has slight bladder incontinence at times

## 2017-03-04 NOTE — IP AVS SNAPSHOT
Summary of Care Report The Summary of Care report has been created to help improve care coordination. Users with access to [employer] or [address] (Web-based application) may access additional patient information including the Discharge Summary. If you are not currently a [address] user and need more information, please call the number listed below in the [place] [address] section and ask to be connected with Medical Records. Facility Information Name Address Phone [address]  [address] [id number] [phone] Patient Information Patient Name Sex  Andee Galeazzi ([id number]) Female 3/2/1924 Discharge Information Admitting Provider Service Area Unit John Bergman MD / Ledy [facility] [address] [facility] [phone] Discharge Provider Discharge Date/Time Discharge Disposition Destination (none) 3/6/2017 (Pending) AHR (none) Patient Language Language ENGLISH [13] Problem List as of 3/6/2017  Date Reviewed: 3/4/2017 Codes Priority Class Noted - Resolved CAD (coronary artery disease), native coronary artery ICD-10-CM: I25.10 ICD-9-CM: 414.01   2011 - Present AF (atrial fibrillation) (HCC) ICD-10-CM: I48.91 
ICD-9-CM: 427.31   2011 - Present Arrhythmia sinus bradycardia ICD-10-CM: I49.5 ICD-9-CM: 427.81   2011 - Present * (Principal)Acute pulmonary edema (HCC) ICD-10-CM: J81.0 ICD-9-CM: 518.4   3/4/2017 - Present Troponin level elevated ICD-10-CM: R79.89 ICD-9-CM: 790.6   3/4/2017 - Present Anemia ICD-10-CM: D64.9 ICD-9-CM: 285.9   3/4/2017 - Present ARF (acute renal failure) (HCC) ICD-10-CM: N17.9 ICD-9-CM: 584.9   3/4/2017 - Present Acute respiratory failure ([address] ICD-10-CM: J96.00 
ICD-9-CM: 518.81   3/4/2017 - Present You are allergic to the following No active allergies Current Discharge Medication List  
  
START taking these medications Dose & Instructions Dispensing Information Comments  
 doxycycline 100 mg tablet Commonly known as:  VIBRA-TABS Dose:  100 mg Take 1 Tab by mouth two (2) times daily (with meals) for 5 days. Quantity:  10 Tab Refills:  0  
   
 ferrous sulfate 325 mg (65 mg iron) tablet Dose:  325 mg Take 1 Tab by mouth Daily (before breakfast). Quantity:  30 Tab Refills:  0 CONTINUE these medications which have NOT CHANGED Dose & Instructions Dispensing Information Comments Mil Garcia Generic drug:  ceramides 1,3,6-11  
 by Apply Externally route daily as needed (dry skin). Refills:  0  
   
 clopidogrel 75 mg Tab Commonly known as:  PLAVIX Dose:  75 mg Take 75 mg by mouth daily. Refills:  0  
   
 COLACE 100 mg capsule Generic drug:  docusate sodium Dose:  200 mg Take 200 mg by mouth nightly. Refills:  0 DELZICOL 400 mg Cdti capsule Generic drug:  mesalamine Dose:  400 mg Take 400 mg by mouth three (3) times daily as needed (Constipation). Refills:  0  
   
 diltiazem hcl 240 mg Tb24 Dose:  1 Tab Take 1 Tab by mouth nightly. Refills:  0  
   
 doxepin 25 mg capsule Commonly known as:  SINEquan Dose:  25 mg Take 25 mg by mouth nightly. Refills:  0  
   
 furosemide 40 mg tablet Commonly known as:  LASIX Dose:  40 mg Take 40 mg by mouth daily. Refills:  0  
   
 methenamine hippurate 1 gram tablet Commonly known as:  HIPREX Dose:  1 g Take 1 g by mouth two (2) times daily (with meals). Refills:  0 NexIUM 40 mg capsule Generic drug:  esomeprazole Dose:  40 mg Take 40 mg by mouth Daily (before breakfast). Refills:  0  
   
 simvastatin 10 mg tablet Commonly known as:  ZOCOR Dose:  10 mg Take 10 mg by mouth nightly.   
 Refills:  0  
   
 TOPROL XL 50 mg XL tablet Generic drug:  metoprolol succinate Dose:  50 mg Take 50 mg by mouth daily. Refills:  0  
   
 VESIcare 5 mg tablet Generic drug:  solifenacin Dose:  5 mg Take 5 mg by mouth nightly. Refills:  0 STOP taking these medications Comments  
 trimethoprim-sulfamethoxazole 160-800 mg per tablet Commonly known as:  BACTRIM DS, SEPTRA DS Follow-up Information Follow up With Details Comments Contact Info [facility]   [id number] [address] [facility] [address] [facility] 
[phone] Not On File Bshsi   Not On File (62) Patient has a PCP but that physician is not listed in [facility]. Discharge Instructions HOSPITALIST DISCHARGE INSTRUCTIONS 
NAME: Mayda Castellanos :  3/2/1924 MRN:  [id number] Date/Time:  3/6/2017 2:34 PM 
 
ADMIT DATE: 3/4/2017 DISCHARGE DATE: 3/6/2017 ADMITTING DIAGNOSIS: 
Hypoxia likely 2/2 volume overload DISCHARGE DIAGNOSIS: 
As above MEDICATIONS: 
  
· It is important that you take the medication exactly as they are prescribed. · Keep your medication in the bottles provided by the pharmacist and keep a list of the medication names, dosages, and times to be taken in your wallet. · Do not take other medications without consulting your doctor. Pain Management: per above medications What to do at [facility] Recommended diet:  Cardiac Diet Recommended activity: Activity as tolerated If you experience any of the following symptoms then please call your primary care physician or return to the emergency room if you cannot get hold of your doctor: 
Fever, chills, nausea, vomiting, diarrhea, change in mentation, falling, bleeding, shortness of breath, chest pain Follow Up: 
Please follow-up with PCP in 1-2 weeks Information obtained by : 
I understand that if any problems occur once I am at home I am to contact my physician.  
 
I understand and acknowledge receipt of the instructions indicated above. Physician's or R.N.'s Signature                                                                  Date/Time Patient or Representative Signature                                                          Date/Time Heart Failure: Care Instructions Your Care Instructions Heart failure occurs when your heart does not pump as much blood as the body needs. Failure does not mean that the heart has stopped pumping but rather that it is not pumping as well as it should. Over time, this causes fluid buildup in your lungs and other parts of your body. Fluid buildup can cause shortness of breath, fatigue, swollen ankles, and other problems. By taking medicines regularly, reducing sodium (salt) in your diet, checking your weight every day, and making lifestyle changes, you can feel better and live longer. Follow-up care is a key part of your treatment and safety. Be sure to make and go to all appointments, and call your doctor if you are having problems. It's also a good idea to know your test results and keep a list of the medicines you take. How can you care for yourself at home? Medicines · Be safe with medicines. Take your medicines exactly as prescribed. Call your doctor if you think you are having a problem with your medicine. · Do not take any vitamins, over-the-counter medicine, or herbal products without talking to your doctor first. Cyrena Hedge not take ibuprofen (Advil or Motrin) and naproxen (Aleve) without talking to your doctor first. They could make your heart failure worse. · You may be taking some of the following medicine.  
¨ Beta-blockers can slow heart rate, decrease blood pressure, and improve your condition. Taking a beta-blocker may lower your chance of needing to be hospitalized. ¨ Angiotensin-converting enzyme inhibitors (ACEIs) reduce the heart's workload, lower blood pressure, and reduce swelling. Taking an ACEI may lower your chance of needing to be hospitalized again. ¨ Angiotensin II receptor blockers (ARBs) work like ACEIs. Your doctor may prescribe them instead of ACEIs. ¨ Diuretics, also called water pills, reduce swelling. ¨ Potassium supplements replace this important mineral, which is sometimes lost with diuretics. ¨ Aspirin and other blood thinners prevent blood clots, which can cause a stroke or heart attack. You will get more details on the specific medicines your doctor prescribes. Diet · Your doctor may suggest that you limit sodium to 1,500 milligrams (mg) a day. That is less than 1 teaspoon of salt a day, including all the salt you eat in cooking or in packaged foods. People get most of their sodium from processed foods. Fast food and restaurant meals also tend to be very high in sodium. · Ask your doctor how much liquid you can drink each day. You may have to limit liquids. Weight · Weigh yourself without clothing at the same time each day. Record your weight. Call your doctor if you gain more than 3 pounds in 24 hours or 5 pounds in one week. A sudden weight gain may mean that your heart failure is getting worse. Activity level · Start light exercise (if your doctor says it is okay). Even if you can only do a small amount, exercise will help you get stronger, have more energy, and manage your weight and your stress. Walking is an easy way to get exercise. Start out by walking a little more than you did before. Bit by bit, increase the amount you walk. · When you exercise, watch for signs that your heart is working too hard. You are pushing yourself too hard if you cannot talk while you are exercising.  If you become short of breath or dizzy or have chest pain, stop, sit down, and rest. 
· If you feel \"wiped out\" the day after you exercise, walk slower or for a shorter distance until you can work up to a better pace. · Get enough rest at night. Sleeping with 1 or 2 pillows under your upper body and head may help you breathe easier. Lifestyle changes · Do not smoke. Smoking can make a heart condition worse. If you need help quitting, talk to your doctor about stop-smoking programs and medicines. These can increase your chances of quitting for good. Quitting smoking may be the most important step you can take to protect your heart. · Limit alcohol to 2 drinks a day for men and 1 drink a day for women. Too much alcohol can cause health problems. · Avoid getting sick from colds and the flu. Get a pneumococcal vaccine shot. If you have had one before, ask your doctor whether you need another dose. Get a flu shot each year. If you must be around people with colds or the flu, wash your hands often. When should you call for help? Call 911 if you have symptoms of sudden heart failure such as: 
· You have severe trouble breathing. · You cough up pink, foamy mucus. · You have a new irregular or rapid heartbeat. Call your doctor now or seek immediate medical care if: 
· You have new or increased shortness of breath. · You are dizzy or lightheaded, or you feel like you may faint. · You have sudden weight gain, such as 3 pounds in 24 hours, or 5 pounds in one week. · You have increased swelling in your legs, ankles, or feet. · You are suddenly so tired or weak that you cannot do your usual activities. Watch closely for changes in your health, and be sure to contact your doctor if: 
· You develop new symptoms. Where can you learn more? Go to [employer] Enter Z446 in the search box to learn more about \"Heart Failure: Care Instructions. \"  
© 3572-6188 [employer].  Care instructions adapted under license by [employer] (which disclaims liability or warranty for this information). This care instruction is for use with your licensed healthcare professional. If you have questions about a medical condition or this instruction, always ask your healthcare professional. [address] any warranty or liability for your use of this information. Content Version: 09.1.042115; Current as of: February 20, 2015 (modified 9/26/16). Chart Review Routing History No Routing History on File

## 2017-03-04 NOTE — IP AVS SNAPSHOT
[address] 
[phone] Patient: Mackenzie Ledesma MRN: [id number] :3/2/1924 You are allergic to the following No active allergies Recent Documentation Height Weight BMI Smoking Status 1.524 m 45.9 kg 19.74 kg/m2 Never Smoker Emergency Contacts Name Discharge Info Relation Home Work Mobile [facility] HEALTH SYS CF DISCHARGE CAREGIVER [3] Son [22] [phone] About your hospitalization You were admitted on:  2017 You last received care in the:  [facility] 3 PRO CARE TELE 1 You were discharged on:  2017 Unit phone number:  [phone] Why you were hospitalized Your primary diagnosis was:  Acute Pulmonary Edema (Hcc) Your diagnoses also included:  Troponin Level Elevated, Anemia, Cad (Coronary Artery Disease), Native Coronary Artery, Af (Atrial Fibrillation) (Hcc), Arf (Acute Renal Failure) (Hcc), Acute Respiratory Failure (Hcc) Providers Seen During Your Hospitalizations Provider Role Specialty Primary office phone Chauncey Dempsey MD Attending Provider Emergency Medicine [phone] Marisol Garcia MD Attending Provider Internal Medicine [phone] Your Primary Care Physician (PCP) Primary Care Physician Office Phone Office Fax NOT ON FILE ** None ** ** None ** Follow-up Information Follow up With Details Comments Contact Info [facility]   [address] [address] [facility] 
[phone] Not On File Bshsi   Not On File (62) Patient has a PCP but that physician is not listed in [address] [place] [address]. Current Discharge Medication List  
  
START taking these medications Dose & Instructions Dispensing Information Comments Morning Noon Evening Bedtime  
 doxycycline 100 mg tablet Commonly known as:  VIBRA-TABS Your next dose is:  Today, Tomorrow Other:  _________ Dose:  100 mg Take 1 Tab by mouth two (2) times daily (with meals) for 5 days. Quantity:  10 Tab Refills:  0  
     
   
   
   
  
 ferrous sulfate 325 mg (65 mg iron) tablet Your next dose is: Today, Tomorrow Other:  _________ Dose:  325 mg Take 1 Tab by mouth Daily (before breakfast). Quantity:  30 Tab Refills:  0 CONTINUE these medications which have NOT CHANGED Dose & Instructions Dispensing Information Comments Morning Noon Evening Bedtime Jade Rubio Generic drug:  ceramides 1,3,6-11 Your next dose is: Today, Tomorrow Other:  _________  
   
   
 by Apply Externally route daily as needed (dry skin). Refills:  0  
     
   
   
   
  
 clopidogrel 75 mg Tab Commonly known as:  PLAVIX Your next dose is: Today, Tomorrow Other:  _________ Dose:  75 mg Take 75 mg by mouth daily. Refills:  0  
     
   
   
   
  
 COLACE 100 mg capsule Generic drug:  docusate sodium Your next dose is: Today, Tomorrow Other:  _________ Dose:  200 mg Take 200 mg by mouth nightly. Refills:  0 DELZICOL 400 mg Cdti capsule Generic drug:  mesalamine Your next dose is: Today, Tomorrow Other:  _________ Dose:  400 mg Take 400 mg by mouth three (3) times daily as needed (Constipation). Refills:  0  
     
   
   
   
  
 diltiazem hcl 240 mg Tb24 Your next dose is: Today, Tomorrow Other:  _________ Dose:  1 Tab Take 1 Tab by mouth nightly. Refills:  0  
     
   
   
   
  
 doxepin 25 mg capsule Commonly known as:  SINEquan Your next dose is: Today, Tomorrow Other:  _________ Dose:  25 mg Take 25 mg by mouth nightly. Refills:  0  
     
   
   
   
  
 furosemide 40 mg tablet Commonly known as:  LASIX Your next dose is: Today, Tomorrow Other:  _________ Dose:  40 mg Take 40 mg by mouth daily. Refills:  0  
     
   
   
   
  
 methenamine hippurate 1 gram tablet Commonly known as:  HIPREX Your next dose is: Today, Tomorrow Other:  _________ Dose:  1 g Take 1 g by mouth two (2) times daily (with meals). Refills:  0 NexIUM 40 mg capsule Generic drug:  esomeprazole Your next dose is: Today, Tomorrow Other:  _________ Dose:  40 mg Take 40 mg by mouth Daily (before breakfast). Refills:  0  
     
   
   
   
  
 simvastatin 10 mg tablet Commonly known as:  ZOCOR Your next dose is: Today, Tomorrow Other:  _________ Dose:  10 mg Take 10 mg by mouth nightly. Refills:  0  
     
   
   
   
  
 TOPROL XL 50 mg XL tablet Generic drug:  metoprolol succinate Your next dose is: Today, Tomorrow Other:  _________ Dose:  50 mg Take 50 mg by mouth daily. Refills:  0  
     
   
   
   
  
 VESIcare 5 mg tablet Generic drug:  solifenacin Your next dose is: Today, Tomorrow Other:  _________ Dose:  5 mg Take 5 mg by mouth nightly. Refills:  0 STOP taking these medications   
 trimethoprim-sulfamethoxazole 160-800 mg per tablet Commonly known as:  BACTRIM DS, SEPTRA DS Where to Get Your Medications Information on where to get these meds will be given to you by the nurse or doctor. ! Ask your nurse or doctor about these medications  
  doxycycline 100 mg tablet  
 ferrous sulfate 325 mg (65 mg iron) tablet Discharge Instructions HOSPITALIST DISCHARGE INSTRUCTIONS 
NAME: Vanessa Be :  3/2/1924 MRN:  [id number] Date/Time:  3/6/2017 2:34 PM 
 
ADMIT DATE: 3/4/2017 DISCHARGE DATE: 3/6/2017 ADMITTING DIAGNOSIS: 
Hypoxia likely 2/2 volume overload DISCHARGE DIAGNOSIS: 
As above MEDICATIONS: 
  
· It is important that you take the medication exactly as they are prescribed. · Keep your medication in the bottles provided by the pharmacist and keep a list of the medication names, dosages, and times to be taken in your wallet. · Do not take other medications without consulting your doctor. Pain Management: per above medications What to do at [facility] Recommended diet:  Cardiac Diet Recommended activity: Activity as tolerated If you experience any of the following symptoms then please call your primary care physician or return to the emergency room if you cannot get hold of your doctor: 
Fever, chills, nausea, vomiting, diarrhea, change in mentation, falling, bleeding, shortness of breath, chest pain Follow Up: 
Please follow-up with PCP in 1-2 weeks Information obtained by : 
I understand that if any problems occur once I am at home I am to contact my physician. I understand and acknowledge receipt of the instructions indicated above. Physician's or R.N.'s Signature                                                                  Date/Time Patient or Representative Signature                                                          Date/Time Heart Failure: Care Instructions Your Care Instructions Heart failure occurs when your heart does not pump as much blood as the body needs. Failure does not mean that the heart has stopped pumping but rather that it is not pumping as well as it should. Over time, this causes fluid buildup in your lungs and other parts of your body.  Fluid buildup can cause shortness of breath, fatigue, swollen ankles, and other problems. By taking medicines regularly, reducing sodium (salt) in your diet, checking your weight every day, and making lifestyle changes, you can feel better and live longer. Follow-up care is a key part of your treatment and safety. Be sure to make and go to all appointments, and call your doctor if you are having problems. It's also a good idea to know your test results and keep a list of the medicines you take. How can you care for yourself at home? Medicines · Be safe with medicines. Take your medicines exactly as prescribed. Call your doctor if you think you are having a problem with your medicine. · Do not take any vitamins, over-the-counter medicine, or herbal products without talking to your doctor first. Claretha Em not take ibuprofen (Advil or Motrin) and naproxen (Aleve) without talking to your doctor first. They could make your heart failure worse. · You may be taking some of the following medicine. ¨ Beta-blockers can slow heart rate, decrease blood pressure, and improve your condition. Taking a beta-blocker may lower your chance of needing to be hospitalized. ¨ Angiotensin-converting enzyme inhibitors (ACEIs) reduce the heart's workload, lower blood pressure, and reduce swelling. Taking an ACEI may lower your chance of needing to be hospitalized again. ¨ Angiotensin II receptor blockers (ARBs) work like ACEIs. Your doctor may prescribe them instead of ACEIs. ¨ Diuretics, also called water pills, reduce swelling. ¨ Potassium supplements replace this important mineral, which is sometimes lost with diuretics. ¨ Aspirin and other blood thinners prevent blood clots, which can cause a stroke or heart attack. You will get more details on the specific medicines your doctor prescribes. Diet · Your doctor may suggest that you limit sodium to 1,500 milligrams (mg) a day. That is less than 1 teaspoon of salt a day, including all the salt you eat in cooking or in packaged foods.  People get most of their sodium from processed foods. Fast food and restaurant meals also tend to be very high in sodium. · Ask your doctor how much liquid you can drink each day. You may have to limit liquids. Weight · Weigh yourself without clothing at the same time each day. Record your weight. Call your doctor if you gain more than 3 pounds in 24 hours or 5 pounds in one week. A sudden weight gain may mean that your heart failure is getting worse. Activity level · Start light exercise (if your doctor says it is okay). Even if you can only do a small amount, exercise will help you get stronger, have more energy, and manage your weight and your stress. Walking is an easy way to get exercise. Start out by walking a little more than you did before. Bit by bit, increase the amount you walk. · When you exercise, watch for signs that your heart is working too hard. You are pushing yourself too hard if you cannot talk while you are exercising. If you become short of breath or dizzy or have chest pain, stop, sit down, and rest. 
· If you feel \"wiped out\" the day after you exercise, walk slower or for a shorter distance until you can work up to a better pace. · Get enough rest at night. Sleeping with 1 or 2 pillows under your upper body and head may help you breathe easier. Lifestyle changes · Do not smoke. Smoking can make a heart condition worse. If you need help quitting, talk to your doctor about stop-smoking programs and medicines. These can increase your chances of quitting for good. Quitting smoking may be the most important step you can take to protect your heart. · Limit alcohol to 2 drinks a day for men and 1 drink a day for women. Too much alcohol can cause health problems. · Avoid getting sick from colds and the flu. Get a pneumococcal vaccine shot. If you have had one before, ask your doctor whether you need another dose. Get a flu shot each year. If you must be around people with colds or the flu, wash your hands often. When should you call for help? Call 911 if you have symptoms of sudden heart failure such as: 
· You have severe trouble breathing. · You cough up pink, foamy mucus. · You have a new irregular or rapid heartbeat. Call your doctor now or seek immediate medical care if: 
· You have new or increased shortness of breath. · You are dizzy or lightheaded, or you feel like you may faint. · You have sudden weight gain, such as 3 pounds in 24 hours, or 5 pounds in one week. · You have increased swelling in your legs, ankles, or feet. · You are suddenly so tired or weak that you cannot do your usual activities. Watch closely for changes in your health, and be sure to contact your doctor if: 
· You develop new symptoms. Where can you learn more? Go to [employer] Enter F554 in the search box to learn more about \"Heart Failure: Care Instructions. \"  
© 5007-0634 Healthwise, [employer]. Care instructions adapted under license by [employer] (which disclaims liability or warranty for this information). This care instruction is for use with your licensed healthcare professional. If you have questions about a medical condition or this instruction, always ask your healthcare professional. [place] [address] any warranty or liability for your use of this information. Content Version: 93.9.887729; Current as of: February 20, 2015 (modified 9/26/16). Discharge Instructions Attachments/References DOXYCYCLINE (BY MOUTH) (ENGLISH) IRON SUPPLEMENTS (BY MOUTH) (ENGLISH) Discharge Orders None [facility] Announcement We are excited to announce that we are making your provider's discharge notes available to you in ARDACO. You will see these notes when they are completed and signed by the physician that discharged you from your recent hospital stay.   If you have any questions or concerns about any information you see in ARDACO, please call the [employer] Information Department where you were seen or reach out to your Primary Care Provider for more information about your plan of care. Introducing [facility]! Caitie Merchant introduces [employer] patient portal. Now you can access parts of your medical record, email your doctor's office, and request medication refills online. 1. In your internet browser, go to [email or web address] 2. Click on the First Time User? Click Here link in the Sign In box. You will see the New Member Sign Up page. 3. Enter your Neovasc Access Code exactly as it appears below. You will not need to use this code after youve completed the sign-up process. If you do not sign up before the expiration date, you must request a new code. · [employer]ovasc Access Code: [email or web address] Expires: 6/2/2017  1:06 PM 
 
4. Enter the last four digits of your Social Security Number (xxxx) and Date of Birth (mm/dd/yyyy) as indicated and click Submit. You will be taken to the next sign-up page. 5. Create a Neovasc ID. This will be your Neovasc login ID and cannot be changed, so think of one that is secure and easy to remember. 6. Create a Neovasc password. You can change your password at any time. 7. Enter your Password Reset Question and Answer. This can be used at a later time if you forget your password. 8. Enter your e-mail address. You will receive e-mail notification when new information is available in [address] 9. Click Sign Up. You can now view and download portions of your medical record. 10. Click the Download Summary menu link to download a portable copy of your medical information. If you have questions, please visit the Frequently Asked Questions section of the Neovasc website. Remember, [employer]asc is NOT to be used for urgent needs. For medical emergencies, dial 911. Now available from your iPhone and Android! General Information  Please provide this summary of care documentation to your next provider. Patient Signature:  ____________________________________________________________ Date:  ____________________________________________________________  
  
Zohreh Benitezulds Provider Signature:  ____________________________________________________________ Date:  ____________________________________________________________ More Information Doxycycline (By mouth) Doxycycline (yic-n-EKS-adriane) Treats and prevents infections. Also used to prevent malaria and treat rosacea or severe acne. This medicine is a tetracycline antibiotic. Brand Name(s):Acticlate, Adoxa, Adoxa Gavin 1/150, Avidoxy, Avidoxy DK, BenzoDox 30 Kit, BenzoDox 60 Kit, Doryx, Monodox, Morgidox [id number], Morgidox 6e942NK Kit, Morgidox 0I471WM, Morgidox 5n839CO Kit, NutriDox Convenience, [address] There may be other brand names for this medicine. When This Medicine Should Not Be Used: This medicine is not right for everyone. Do not use it if you had an allergic reaction to doxycycline or another tetracycline antibiotic, or if you are pregnant or breastfeeding. How to Use This Medicine:  
Capsule, Delayed Release Capsule, Long Acting Capsule, Liquid, Tablet, Delayed Release Tablet · Your doctor will tell you how much medicine to use. Do not use more than directed. · Ask your pharmacist or doctor if you need to take this medicine with or without food. Some forms can be taken with food or milk, but others must be taken on an empty stomach. · Tablets: You may take this medicine with food or milk to avoid stomach irritation. To break a tablet, hold the tablet between your thumb and index fingers close to the appropriate scored line. Then, apply enough pressure to snap the tablet segments apart. Do not use the tablet if it does not break on the scored lines. · Delayed-release tablets: You may also take this medicine by sprinkling the broken tablets onto room-temperature applesauce.  Swallow this mixture right away; do not chew. Do not store the mixture for later use. · Oracea® capsules: This medicine must be taken on an empty stomach, at least 1 hour before or 2 hours after a meal. 
· Capsule: Swallow whole. Do not break, crush, chew, or open it. · Oral liquid: Shake the bottle well just before each use. Measure the oral liquid medicine with a marked measuring spoon, oral syringe, or medicine cup. · Take all of the medicine in your prescription to clear up your infection, even if you feel better after the first few doses. · Drink plenty of fluids to avoid throat problems, if you take the capsule or tablet form. · Malaria prevention: Start taking the medicine 1 or 2 days before you travel. Take the medicine every day during your trip. Keep taking it for 4 weeks after you return. However, do not use the medicine for longer than 4 months. · Do not use this medicine for more than 9 months if you are using it for rosacea. · Use only the brand of medicine your doctor prescribed. Other brands may not work the same way. · Read and follow the patient instructions that come with this medicine. Talk to your doctor or pharmacist if you have any questions. · Missed dose: Take a dose as soon as you remember. If it is almost time for your next dose, wait until then and take a regular dose. Do not take extra medicine to make up for a missed dose. · Store the medicine in a closed container at room temperature, away from heat, moisture, and direct light. Do not freeze the oral liquid. Drugs and Foods to Avoid: Ask your doctor or pharmacist before using any other medicine, including over-the-counter medicines, vitamins, and herbal products. · Some foods and medicines can affect how doxycycline works. Tell your doctor if you are using any of the following: ¨ Bismuth subsalicylate, isotretinoin or other acne medicines, acitretin or other medicine to treat psoriasis ¨ Penicillin antibiotic, birth control pills, medicine for seizures (such as carbamazepine, phenobarbital, phenytoin), stomach medicine, a blood thinner (such as warfarin), or any medicine that contains aluminum, calcium, or iron (such an antacid or vitamin supplement) Warnings While Using This Medicine: · This medicine may cause birth defects if either partner is using it during conception or pregnancy. Tell your doctor right away if you or your partner becomes pregnant. Birth control pills may not work as well when used with this medicine. Use a second form of birth control to keep from getting pregnant. · Tell your doctor if you have kidney disease, liver disease, asthma, or an allergy to sulfites. Tell your doctor if you had surgery on your stomach, or if you have a history of yeast infections. · This medicine may cause the following problems: ¨ Permanent change in tooth color (in children younger than 6years old) ¨ Increased pressure inside the head ¨ Yeast infection ¨ Immune system problems · This medicine can cause diarrhea. Call your doctor if the diarrhea becomes severe, does not stop, or is bloody. Do not take any medicine to stop diarrhea until you have talked to your doctor. Diarrhea can occur 2 months or more after you stop taking this medicine. · This medicine may make your skin more sensitive to sunlight. Wear sunscreen. Do not use sunlamps or tanning beds. · Tell any doctor or dentist who treats you that you are using this medicine. This medicine may affect certain medical test results. · Call your doctor if your symptoms do not improve or if they get worse. · Keep all medicine out of the reach of children. Never share your medicine with anyone. Possible Side Effects While Using This Medicine:  
Call your doctor right away if you notice any of these side effects: · Allergic reaction: Itching or hives, swelling in your face or hands, swelling or tingling in your mouth or throat, chest tightness, trouble breathing · Blistering, peeling, red skin rash · Burning, pain, or irritation in your upper stomach or throat · Diarrhea that may contain blood · Fever, chills, cough, runny or stuffy nose, sore throat, and body aches · Joint pain, fever, rash, and unusual tiredness or weakness · Severe headache, dizziness, or vision changes If you notice these less serious side effects, talk with your doctor: · Darkening of your skin, scars, teeth, or gums · Sores or white patches on your lips, mouth, or throat If you notice other side effects that you think are caused by this medicine, tell your doctor. Call your doctor for medical advice about side effects. You may report side effects to FDA at 8-468-KFE-4374 © 2016 [address] is for End User's use only and may not be sold, redistributed or otherwise used for commercial purposes. The above information is an  only. It is not intended as medical advice for individual conditions or treatments. Talk to your doctor, nurse or pharmacist before following any medical regimen to see if it is safe and effective for you. Iron Supplements (By mouth) Treats low blood iron or anemia by helping your body make red blood cells. Brand Name(s):Beef/Iron/Wine, Bifera, BiferaRx, Corvite FE, Duofer, EZFE 200, Enfamil Denis-In-Sol, [facility] Iron Tablets, Fe-20, Femcon Fe, Femiron, Feosol, Feosol [facility], Denis [place] Pic 150, Denis-Iron There may be other brand names for this medicine. When This Medicine Should Not Be Used: You should not use this medicine if you have had an allergic reaction to iron supplements, or if you have a condition called hemachromatosis (iron overload disease) or hemosiderosis (iron in the lungs), or any type of anemia that is not caused by iron deficiency. How to Use This Medicine:  
Liquid Filled Capsule, Coated Tablet, Tablet, Capsule, Chewable Tablet, Liquid, Long Acting Capsule, Long Acting Tablet · Your doctor will tell you how much of this medicine to take and how often. Do not take more medicine or take it more often than your doctor tells you to. Carefully follow your doctor's instructions about any special diet. · It is best to take this medicine on an empty stomach, 1 hour before or 2 hours after a meal. Take the medicine with a full glass or water or fruit juice. If the medicine upsets your stomach, you may take it with food. · The chewable tablet must be chewed or crushed before you swallow it. · Measure the oral liquid medicine with a marked measuring spoon or medicine cup. · The oral liquid may stain your teeth. These stains can be prevented by mixing the medicine with water or other liquids (such as fruit juice, tomato juice), and drinking the medicine with a straw. To remove any iron stains, brush your teeth with baking soda or peroxide. If a dose is missed: · If you miss a dose or forget to take your medicine, take it as soon as you can. If it is almost time for your next dose, wait until then to take the medicine and skip the missed dose. · Do not use extra medicine to make up for a missed dose. How to Store and Dispose of This Medicine: · Store the medicine at room temperature, away from heat, moisture, and direct light. · Keep all medicine away from children, and never share your medicine with anyone. Drugs and Foods to Avoid: Ask your doctor or pharmacist before using any other medicine, including over-the-counter medicines, vitamins, and herbal products. · Do not take iron supplements by mouth if you are also receiving iron injections. · Make sure your doctor knows if you are also using phenytoin (Dilantin®), acetohydroxamic acid (Lithostat®), or antibiotics such as demeclocycline, doxycycline (Vibramycin®), Cipro®, Levaquin®, minocycline, moxifloxacin (Avelox®), Tequin®, or tetracycline. · Tell your doctor if you are using antacids (such as Maalox® or Mylanta®).  
· Avoid the following foods, or eat them in small amounts at least 1 hour before or 2 hours after taking your iron: eggs, milk, cheese, yogurt, tea or coffee, whole-grain cereals, and breads. Warnings While Using This Medicine: · Make sure your doctor knows if you are pregnant or breastfeeding, or if you have stomach or intestinal problems, an active infection, diabetes, porphyria, or other medical problems. · Make sure any doctor or dentist who treats you knows that you are using this medicine. Iron may affect the results of certain medical tests. · Iron can cause your stools to be darker in color. This is normal and is not a cause for concern. Possible Side Effects While Using This Medicine:  
Call your doctor right away if you notice any of these side effects: · Bloody diarrhea · Bluish-colored lips, hands, or fingernails · Chest pain · Fever · Pale or clammy skin · Severe or continuing stomach cramps, vomiting (with or without blood) · Shallow breathing, weakness, weak but fast heartbeat If you notice these less serious side effects, talk with your doctor: · Constipation, diarrhea, nausea · Dark-colored urine · Leg cramps If you notice other side effects that you think are caused by this medicine, tell your doctor. Call your doctor for medical advice about side effects. You may report side effects to FDA at 1-500-FDA-3434 © 2016 [address] is for End User's use only and may not be sold, redistributed or otherwise used for commercial purposes. The above information is an  only. It is not intended as medical advice for individual conditions or treatments. Talk to your doctor, nurse or pharmacist before following any medical regimen to see if it is safe and effective for you.

## 2017-03-04 NOTE — ROUTINE PROCESS
TRANSFER - OUT REPORT:    Verbal report given to Bonita Vidal RN on Edgar Brown  being transferred to [place] [id number] for higher level of care. Report consisted of patients Situation, Background, Assessment and   Recommendations(SBAR). Information from the following report(s) SBAR was reviewed with the receiving nurse. Lines:   Peripheral IV 03/04/17 Left Antecubital (Active)   Site Assessment Clean, dry, & intact 3/4/2017  1:37 PM   Phlebitis Assessment 0 3/4/2017  1:37 PM   Infiltration Assessment 0 3/4/2017  1:37 PM   Dressing Status Clean, dry, & intact 3/4/2017  1:37 PM   Dressing Type Tape;Transparent 3/4/2017  1:37 PM   Hub Color/Line Status Blue;Flushed 3/4/2017  1:37 PM   Alcohol Cap Used Yes 3/4/2017  1:37 PM        Opportunity for questions and clarification was provided.       Patient transported with:   Luzmaria Armstrong

## 2017-03-04 NOTE — ED NOTES
Attempted to call report to [address] [place] [address], 3rd floor. Nurse unavailable.  Will call back

## 2017-03-05 NOTE — PROGRESS NOTES
Occupational Therapy  Order received and chart reviewed, attempted to see for evaluation however family request to let her rest as she was tired. Will follow up tomorrow.    Floridalma Yarbrough, OTR/L

## 2017-03-05 NOTE — PROGRESS NOTES
Problem: Mobility Impaired (Adult and Pediatric)  Goal: *Acute Goals and Plan of Care (Insert Text)  Physical Therapy Goals  Initiated 3/5/2017  1. Patient will move from supine to sit and sit to supine in bed with independence within 7 day(s). 2. Patient will transfer from bed to chair and chair to bed with modified independence using the least restrictive device within 7 day(s). 3. Patient will perform sit to stand with modified independence within 7 day(s). 4. Patient will ambulate with modified independence for 50 feet with the least restrictive device within 7 day(s). 5. Patient will ascend/descend 5 stairs with single handrail(s) with supervision/set-up within 7 day(s). PHYSICAL THERAPY EVALUATION  Patient: Angel Luis Carrillo (29 y.o. female)  Date: 3/5/2017  Primary Diagnosis: Acute respiratory failure ([facility] [address]        Precautions:   Fall, DNR      ASSESSMENT :  Based on the objective data described below, the patient presents with generalized weakness, decreased functional mobility, impaired balance and gait. Patient reported she was independent with RW for ambulation and completing mobility tasks at home without assist up until a few days ago when she began feeling weak. Patient stated she was receiving assistance from her son to mobilize. Patient required min assist for supine<>sit edge of bed, increased time to complete. Sit<>stand with min assist and RW and noted to have significant posterior trunk leaning requiring verbal cues and tactile cues to correct. However, patient unable to maintain upright neutral posture. Patient side stepped along bedside with min-mod assist and RW with short, shuffle steps and continued posterior trunk leaning. Patient returned to supine position with bed placed in chair position. Patient will continue to benefit from PT to progress mobility as tolerated.  At this time would recommend short rehab stay prior to returning home to assist with returning to prior level of function. If patient progresses well with acute therapies, patient may be able to return home with HHPT and 24 hour assist and care from family. .     Patient will benefit from skilled intervention to address the above impairments. Patients rehabilitation potential is considered to be Fair  Factors which may influence rehabilitation potential include:   [ ]         None noted  [ ]         Mental ability/status  [X]         Medical condition  [ ]         Home/family situation and support systems  [ ]         Safety awareness  [ ]         Pain tolerance/management  [ ]         Other:        PLAN :  Recommendations and Planned Interventions:  [X]           Bed Mobility Training             [ ]    Neuromuscular Re-Education  [X]           Transfer Training                   [ ]    Orthotic/Prosthetic Training  [X]           Gait Training                         [ ]    Modalities  [X]           Therapeutic Exercises           [ ]    Edema Management/Control  [X]           Therapeutic Activities            [X]    Patient and Family Training/Education  [ ]           Other (comment):     Frequency/Duration: Patient will be followed by physical therapy  5 times a week to address goals. Discharge Recommendations: Skilled Nursing Facility  Further Equipment Recommendations for Discharge: pt owns RW       SUBJECTIVE:   Patient stated Taya Finch was moving around fine until a few days ago.       OBJECTIVE DATA SUMMARY:   HISTORY:    Past Medical History:   Diagnosis Date    Hypertension       Past Surgical History:   Procedure Laterality Date    HX PACEMAKER PLACEMENT         Prior Level of Function/Home Situation: mod I with use of rolling walker. Patient lives with her son and daughter in law.  Patient reported she will use a wheelchair for long distance ambulation  Personal factors and/or comorbidities impacting plan of care:      Home Situation  Home Environment: Private residence  # Steps to Enter: 4  Rails to Enter: Yes  Hand Rails : Right  One/Two Story Residence: One story  Living Alone: No  Support Systems: Child(mikael)  Patient Expects to be Discharged to[de-identified] Private residence  Current DME Used/Available at Home: Walker, rolling, Shower chair, Commode, bedside     EXAMINATION/PRESENTATION/DECISION MAKING:      Skin:  Intact to exposed skin  Strength:    Strength: Generally decreased, functional     Tone & Sensation:   Tone: Normal  Sensation: Intact     Range Of Motion:  AROM: Within functional limits         Coordination:        Functional Mobility:  Bed Mobility:     Supine to Sit: Minimum assistance; Additional time  Sit to Supine: Minimum assistance; Additional time  Scooting: Minimum assistance  Transfers:  Sit to Stand: Minimum assistance; Additional time  Stand to Sit: Minimum assistance; Additional time        Balance:   Sitting: Impaired  Sitting - Static: Fair (occasional)  Sitting - Dynamic: Fair (occasional)  Standing: Impaired  Standing - Static: Constant support; Fair  Standing - Dynamic : Poor  Ambulation/Gait Training:  Distance (ft): 5 Feet (ft)  Assistive Device: Gait belt;Walker, rolling  Ambulation - Level of Assistance: Minimal assistance        Gait Abnormalities: Shuffling gait; Step to gait (increased posterior trunk leaning)        Base of Support: Narrowed     Speed/Sujatha: Shuffled;Pace decreased (<100 feet/min)  Step Length: Right shortened;Left shortened     Therapeutic Exercises:   Patient instructed to perform supine LE therex while in bed to maintain strength and ROM     Functional Measure:  Tinetti test:      Sitting Balance: 0  Arises: 0  Attempts to Rise: 2  Immediate Standing Balance: 0  Standing Balance: 0  Nudged: 0  Eyes Closed: 0  Turn 360 Degrees - Continuous/Discontinuous: 0  Turn 360 Degrees - Steady/Unsteady: 0  Sitting Down: 0  Balance Score: 2  Indication of Gait: 0  R Step Length/Height: 0  L Step Length/Height: 0  R Foot Clearance: 1  L Foot Clearance: 1  Step Symmetry: 1  Step Continuity: 0  Path: 1  Trunk: 0  Walking Time: 0  Gait Score: 4  Total Score: 6         Tinetti Test and G-code impairment scale:  Percentage of Impairment CH     0%    CI     1-19% CJ     20-39% CK     40-59% CL     60-79% CM     80-99% CN      100%   Tinetti  Score 0-28 28 23-27 17-22 12-16 6-11 1-5 0          Tinetti Tool Score Risk of Falls  <19 = High Fall Risk  19-24 = Moderate Fall Risk  25-28 = Low Fall Risk  Tinetti ME. Performance-Oriented Assessment of Mobility Problems in Elderly Patients. [facility]; [id number]. (Scoring Description: PT Bulletin Feb. 10, 1993)     Older adults: Ziyad Boyd et al, 2009; n = 1000 [facility] elderly evaluated with ABC, FABI, ADL, and IADL)  · Mean FABI score for males aged 69-68 years = 26.21(3.40)  · Mean FABI score for females age 69-68 years = 25.16(4.30)  · Mean FABI score for males over 80 years = 23.29(6.02)  · Mean FABI score for females over 80 years = 17.20(8.32)         G codes: In compliance with CMSs Claims Based Outcome Reporting, the following G-code set was chosen for this patient based on their primary functional limitation being treated: The outcome measure chosen to determine the severity of the functional limitation was the Tinetti with a score of 6/28 which was correlated with the impairment scale.       · Mobility - Walking and Moving Around:               - CURRENT STATUS:    CL - 60%-79% impaired, limited or restricted               - GOAL STATUS:           CK - 40%-59% impaired, limited or restricted               - D/C STATUS:                       ---------------To be determined---------------      Physical Therapy Evaluation Charge Determination   History Examination Presentation Decision-Making   MEDIUM  Complexity : 1-2 comorbidities / personal factors will impact the outcome/ POC  HIGH Complexity : 4+ Standardized tests and measures addressing body structure, function, activity limitation and / or participation in recreation  LOW Complexity : Stable, uncomplicated  Other outcome measures Tinetti  HIGH       Based on the above components, the patient evaluation is determined to be of the following complexity level: LOW      Pain:  Pain Scale 1: Numeric (0 - 10)  Pain Intensity 1: 0      Activity Tolerance:   Good. Vital signs stable throughout  Please refer to the flowsheet for vital signs taken during this treatment. After treatment:   [ ]         Patient left in no apparent distress sitting up in chair  [X]         Patient left in no apparent distress in bed  [X]         Call bell left within reach  [X]         Nursing notified  [ ]         Caregiver present  [ ]         Bed alarm activated      COMMUNICATION/EDUCATION:   The patients plan of care was discussed with: Occupational Therapist and Registered Nurse.  [X]         Fall prevention education was provided and the patient/caregiver indicated understanding. [X]         Patient/family have participated as able in goal setting and plan of care. [X]         Patient/family agree to work toward stated goals and plan of care. [ ]         Patient understands intent and goals of therapy, but is neutral about his/her participation. [ ]         Patient is unable to participate in goal setting and plan of care.      Thank you for this referral.  Kameron Barriga, PT, DPT   Time Calculation: 16 mins

## 2017-03-05 NOTE — PROGRESS NOTES
[facility]: MED RECONCILIATION    Comments/Recommendations:    Denies allergy to simvastatin and reports she is taking this medication. Allergy removed. Reports no known allergies   Interview conducted with the patient and son via phone  [address] also reviewed   Drug Drug interaction noted between Bactrim and Hiprex as The concurrent administration of methenamine and sulfamethizole or sulfathiazole is likely to form a precipitate in the urine   Hiprex should not be used in patient's with renal failure   Bactrim use is not recommended in crcl less than 15ml/min - Patient's crcl is 11.9 ml/min    Medications removed:    · Kenalog    Medications adjusted:    · Mesalamine changed from scheduled to prn  · Frequency added to clopidogrel and diltiazem      Significant PMH/Disease States:   Patient Active Problem List   Diagnosis Code    CAD (coronary artery disease), native coronary artery I25.10    AF (atrial fibrillation) ([facility]) I48.91    Arrhythmia sinus bradycardia I49.5    Acute pulmonary edema ([facility]) J81.0    Troponin level elevated R79.89    Anemia D64.9    ARF (acute renal failure) ([facility] [address] N17.9    Acute respiratory failure ([facility] [address] J96.00     Past Medical History:   Diagnosis Date    Hypertension      Chief Complaint for this Admission:   Chief Complaint   Patient presents with    Fatigue     Allergies: Zocor [simvastatin]    Prior to Admission Medications:     Medication Documentation Review Audit       Reviewed by Peg Romero PHARMD (Pharmacist) on 03/04/17 at 2043         Medication Sig Documenting Provider Last Dose Status Taking? ceramides 1,3,6-11 (CERAVE) lotn by Apply Externally route daily as needed (dry skin). Debra Martinez MD  Active Yes    clopidogrel (PLAVIX) 75 mg tab Take 75 mg by mouth daily. Debra Martinez MD 3/4/2017 am Active Yes    diltiazem hcl 240 mg Tb24 Take 1 Tab by mouth nightly.  Debra Martinez MD 3/3/2017 Unknown time Active Yes    docusate sodium (COLACE) 100 mg capsule Take 200 mg by mouth nightly. Historical Provider 3/3/2017 Unknown time Active Yes    doxepin (SINEQUAN) 25 mg capsule Take 25 mg by mouth nightly. Debra Martinez MD 3/3/2017 Unknown time Active Yes    esomeprazole (NEXIUM) 40 mg capsule Take 40 mg by mouth Daily (before breakfast). Debra Martinez MD 3/4/2017 Unknown time Active Yes    furosemide (LASIX) 40 mg tablet Take 40 mg by mouth daily. Debra Martinez MD 3/4/2017 Unknown time Active Yes    mesalamine (DELZICOL) 400 mg cdti capsule Take 400 mg by mouth three (3) times daily as needed (Constipation). Debra Martinez MD  Active Yes    methenamine hippurate (HIPREX) 1 gram tablet Take 1 g by mouth two (2) times daily (with meals). Debra Martinez MD 3/4/2017 am Active Yes    metoprolol succinate (TOPROL XL) 50 mg XL tablet Take 50 mg by mouth daily. Debra Martinez MD 3/4/2017 am Active Yes    simvastatin (ZOCOR) 10 mg tablet Take 10 mg by mouth nightly. Debra Martinez MD 3/3/2017 vesicare Active Yes    solifenacin (VESICARE) 5 mg tablet Take 5 mg by mouth nightly. Debra Martinez MD 3/3/2017 Unknown time Active Yes    trimethoprim-sulfamethoxazole (BACTRIM DS, SEPTRA DS) 160-800 mg per tablet Take 1 Tab by mouth two (2) times a day.  X 5 days started 3/1/17 Debra Martinez MD 3/4/2017 am Active Yes                  Thank you,    Devon Paulino, PharmD, BCPS

## 2017-03-05 NOTE — ED PROVIDER NOTES
HPI Comments: 80 y.o. female with past medical history significant for HTN, pacemaker who presents from home with chief complaint of weakness. Son states that she has not been able to get up out of bed today. Patient states that her legs just won't work because they are weak. States that she is a little SOB. Denies pain or other complaints. There are no other acute medical concerns at this time. Social hx:   Significant FMHx: none  PCP: Not On File [facility]        Patient is a 80 y.o. female presenting with fatigue. Fatigue   Pertinent negatives include no shortness of breath, no chest pain, no vomiting, no headaches and no nausea. Past Medical History:   Diagnosis Date    Hypertension        Past Surgical History:   Procedure Laterality Date    HX PACEMAKER PLACEMENT           History reviewed. No pertinent family history. Social History     Social History    Marital status:      Spouse name: N/A    Number of children: N/A    Years of education: N/A     Occupational History    Not on file. Social History Main Topics    Smoking status: Never Smoker    Smokeless tobacco: Not on file    Alcohol use No    Drug use: Not on file    Sexual activity: Not on file     Other Topics Concern    Not on file     Social History Narrative    No narrative on file         ALLERGIES: Zocor [simvastatin]    Review of Systems   Constitutional: Positive for fatigue. Negative for chills and fever. HENT: Negative for ear pain and sore throat. Eyes: Negative for pain. Respiratory: Negative for chest tightness and shortness of breath. Cardiovascular: Negative for chest pain and leg swelling. Gastrointestinal: Negative for abdominal pain, nausea and vomiting. Genitourinary: Negative for dysuria and flank pain. Musculoskeletal: Negative for back pain. Skin: Negative for rash. Neurological: Negative for headaches. All other systems reviewed and are negative.       Vitals:    03/04/17 1720 03/04/17 1734 03/04/17 1810 03/04/17 1822   BP: 126/56 127/55  131/60   Pulse: 60 60 (!) 59 60   Resp: 17 18  18   Temp:  97.2 °F (36.2 °C)  97.7 °F (36.5 °C)   SpO2: 96% 96%  98%   Weight:       Height:                Physical Exam   Constitutional: No distress. Elderly female   HENT:   Head: Normocephalic and atraumatic. Mouth/Throat: Oropharynx is clear and moist.   Eyes: EOM are normal. Pupils are equal, round, and reactive to light. No scleral icterus. Pale conjunctiva   Neck: Neck supple. No tracheal deviation present. Cardiovascular: Normal rate, regular rhythm, normal heart sounds and intact distal pulses. Pulmonary/Chest: Effort normal. No respiratory distress. She has rales (bilateral bases). Abdominal: Soft. She exhibits no distension. There is no tenderness. There is no rebound and no guarding. Genitourinary:   Genitourinary Comments: deferred   Musculoskeletal: She exhibits no edema. Neurological: She is alert. No cranial nerve deficit. Skin: Skin is warm and dry. Psychiatric: She has a normal mood and affect. Nursing note and vitals reviewed. MDM  ED Course       Procedures    The patient presents with weakness with a differential diagnosis of UTI, electrolyte abnormality, ACS, cord compression    ED Course:  Post-void residual normal.  Patient had oxygen de-saturations while in ED, put on NC with improvement in sats. CXR shows edema. EKG Per My Interpretation:  Rhythm: 1st degree block; Rate (approx.): 63; Axis: left axis deviation; QRS interval: prolonged; ST/T wave: non-specific changes; Other findings: abnormal ekg.      LABORATORY TESTS:  Recent Results (from the past 24 hour(s))   EKG, 12 LEAD, INITIAL    Collection Time: 03/04/17  1:07 PM   Result Value Ref Range    Ventricular Rate 63 BPM    Atrial Rate 63 BPM    P-R Interval 224 ms    QRS Duration 160 ms    Q-T Interval 508 ms    QTC Calculation (Bezet) 519 ms    Calculated R Axis -32 degrees Calculated T Axis 149 degrees    Diagnosis       Sinus rhythm with 1st degree AV block  Left axis deviation  Left bundle branch block  Abnormal ECG  When compared with ECG of 12-NOV-2010 05:45,  Left bundle branch block is now present  Criteria for Inferior infarct are no longer present     METABOLIC PANEL, COMPREHENSIVE    Collection Time: 03/04/17  1:37 PM   Result Value Ref Range    Sodium 131 (L) 136 - 145 mmol/L    Potassium 4.0 3.5 - 5.1 mmol/L    Chloride 94 (L) 97 - 108 mmol/L    CO2 31 21 - 32 mmol/L    Anion gap 6 5 - 15 mmol/L    Glucose 103 (H) 65 - 100 mg/dL    BUN 30 (H) 6 - 20 MG/DL    Creatinine 2.12 (H) 0.55 - 1.02 MG/DL    BUN/Creatinine ratio 14 12 - 20      GFR est AA 26 (L) >60 ml/min/1.73m2    GFR est non-AA 22 (L) >60 ml/min/1.73m2    Calcium 9.3 8.5 - 10.1 MG/DL    Bilirubin, total 0.2 0.2 - 1.0 MG/DL    ALT (SGPT) 16 12 - 78 U/L    AST (SGOT) 19 15 - 37 U/L    Alk. phosphatase 80 45 - 117 U/L    Protein, total 7.5 6.4 - 8.2 g/dL    Albumin 3.3 (L) 3.5 - 5.0 g/dL    Globulin 4.2 (H) 2.0 - 4.0 g/dL    A-G Ratio 0.8 (L) 1.1 - 2.2     CBC WITH AUTOMATED DIFF    Collection Time: 03/04/17  1:37 PM   Result Value Ref Range    WBC 5.2 3.6 - 11.0 K/uL    RBC 2.90 (L) 3.80 - 5.20 M/uL    HGB 8.2 (L) 11.5 - 16.0 g/dL    HCT 25.2 (L) 35.0 - 47.0 %    MCV 86.9 80.0 - 99.0 FL    MCH 28.3 26.0 - 34.0 PG    MCHC 32.5 30.0 - 36.5 g/dL    RDW 16.5 (H) 11.5 - 14.5 %    PLATELET 389 (L) 513 - 400 K/uL    NEUTROPHILS 61 %    LYMPHOCYTES 25 %    MONOCYTES 13 %    EOSINOPHILS 0 %    BASOPHILS 1 %    ABS. NEUTROPHILS 3.1 K/UL    ABS. LYMPHOCYTES 1.3 K/UL    ABS. MONOCYTES 0.7 K/UL    ABS. EOSINOPHILS 0.0 K/UL    ABS.  BASOPHILS 0.1 K/UL    DF MANUAL      RBC COMMENTS ANISOCYTOSIS  1+       TROPONIN I    Collection Time: 03/04/17  1:37 PM   Result Value Ref Range    Troponin-I, Qt. 0.17 (H) <0.08 ng/mL   URINALYSIS W/MICROSCOPIC    Collection Time: 03/04/17  3:00 PM   Result Value Ref Range    Color YELLOW/STRAW Appearance CLEAR CLEAR      Specific gravity 1.008 1.003 - 1.030      pH (UA) 6.5 5.0 - 8.0      Protein NEGATIVE  NEG mg/dL    Glucose NEGATIVE  NEG mg/dL    Ketone NEGATIVE  NEG mg/dL    Bilirubin NEGATIVE  NEG      Blood NEGATIVE  NEG      Urobilinogen 0.2 0.2 - 1.0 EU/dL    Nitrites NEGATIVE  NEG      Leukocyte Esterase NEGATIVE  NEG      WBC 0-4 0 - 4 /hpf    RBC 0-5 0 - 5 /hpf    Epithelial cells FEW FEW /lpf    Bacteria NEGATIVE  NEG /hpf   EKG, 12 LEAD, SUBSEQUENT    Collection Time: 03/04/17  3:40 PM   Result Value Ref Range    Ventricular Rate 60 BPM    Atrial Rate 32 BPM    P-R Interval 284 ms    QRS Duration 174 ms    Q-T Interval 524 ms    QTC Calculation (Bezet) 524 ms    Calculated R Axis -34 degrees    Calculated T Axis 161 degrees    Diagnosis       Atrial-paced rhythm with prolonged AV conduction  Left axis deviation  Left bundle branch block  Abnormal ECG  When compared with ECG of 04-MAR-2017 13:07,  MANUAL COMPARISON REQUIRED, DATA IS UNCONFIRMED     PRO-BNP    Collection Time: 03/04/17  4:12 PM   Result Value Ref Range    NT pro-BNP 6450 (H) 0 - 450 PG/ML   TROPONIN I    Collection Time: 03/04/17  4:12 PM   Result Value Ref Range    Troponin-I, Qt. 0.16 (H) <0.08 ng/mL   CK W/ REFLX CKMB    Collection Time: 03/04/17  4:12 PM   Result Value Ref Range     26 - 192 U/L   SODIUM, UR, RANDOM    Collection Time: 03/04/17  4:12 PM   Result Value Ref Range    Sodium urine, random 17 MMOL/L   CREATININE, UR, RANDOM    Collection Time: 03/04/17  4:12 PM   Result Value Ref Range    Creatinine, urine 37.60 mg/dL   FERRITIN    Collection Time: 03/04/17  4:12 PM   Result Value Ref Range    Ferritin 68 8 - 252 NG/ML   IRON PROFILE    Collection Time: 03/04/17  4:12 PM   Result Value Ref Range    Iron 69 35 - 150 ug/dL    TIBC 341 250 - 450 ug/dL    Iron % saturation 20 20 - 50 %       IMAGING RESULTS:  Ct Head Wo Cont    Result Date: 3/4/2017  EXAM:  CT HEAD WO CONT History: Weakness INDICATION: weakness COMPARISON: None. TECHNIQUE: Unenhanced CT of the head was performed using 5 mm images. Brain and bone windows were generated. CT dose reduction was achieved through use of a standardized protocol tailored for this examination and automatic exposure control for dose modulation. Adaptive statistical iterative reconstruction (ASIR) was utilized. FINDINGS: Extensive confluent periventricular and scattered hypodensity in the cerebral white matter. Extensive sulcal and ventricular prominence. . There is no intracranial hemorrhage, extra-axial collection, mass, mass effect or midline shift. The basilar cisterns are open. No acute infarct is identified. The bone windows demonstrate no abnormalities. Right maxillary sinus disease. IMPRESSION: Severe chronic microvascular ischemic change and moderate cerebral atrophy. Xr Chest Port    Result Date: 3/4/2017  PORTABLE CHEST RADIOGRAPH/S: 3/4/2017 2:32 PM INDICATION: Cough. HISTORY (per electronic medical record): Increasing weakness and fatigue. Unable to use walker. COMPARISON: 11/11/2010, 11/13/2007. TECHNIQUE: Portable frontal upright radiograph/s of the chest. FINDINGS: A small right pleural effusion is associated with passive atelectasis. There are increased interstitial markings compared to prior, suggesting interstitial pulmonary edema. The central airways are patent. A pacemaker is in place. IMPRESSION: Small right pleural effusion. Questionable pulmonary edema.       MEDICATIONS GIVEN:  Medications   sodium chloride (NS) flush 5-10 mL (not administered)   sodium chloride (NS) flush 5-10 mL (not administered)   acetaminophen (TYLENOL) tablet 650 mg (not administered)   naloxone (NARCAN) injection 0.4 mg (not administered)   ondansetron (ZOFRAN) injection 4 mg (not administered)   doxycycline (VIBRA-TABS) tablet 100 mg (not administered)   furosemide (LASIX) injection 40 mg (not administered)   iron sucrose (VENOFER) 100 mg in 0.9% sodium chloride 100 mL IVPB (not administered)   furosemide (LASIX) injection 40 mg (40 mg IntraVENous Given 3/4/17 5352)       IMPRESSION:  1.  Congestive heart failure, unspecified congestive heart failure chronicity, unspecified congestive heart failure type ([facility]        PLAN:  -  lasix    Disposition:  admit    Condition: guarded    Total critical care time spend exclusive of procedures:  0    Marybel Calles MD

## 2017-03-05 NOTE — PROGRESS NOTES
Shift Report:    0720: Verbal bedside report received at this time. Patient is sleeping soundly. 0820: Vitals and assessment obtained. Patient denies any needs at this time. She declined to have on tv or radio. 5617: PT at bedside to eval patient. 1300: Echo tech at bedside. 1330: Patient eating meal tray. She denies any needs at this time. 1600: Patient requesting tear drops. Called Dr. Kitty Santos and received order for artificial tears as needed. 1920: Bedside and Verbal shift change report given to Libby Zavala RN (oncoming nurse) by Karolyn Barron RN (offgoing nurse). Report included the following information SBAR, Kardex, MAR, Accordion and Recent Results.

## 2017-03-05 NOTE — CONSULTS
Reason for Consult: CHF      HPI: Sari Bravo is a 80 y.o. female with PMH of HTN, CAD and PAF admitted with symptoms of increasing SOB, cough and sputum. She has a pacemaker but does not know who implanted. She is no on anticoagulation. No chest pain, fever or chills. She was noted to have mild Trop elevation which is flat with underlying renal failure. EKG show Paced atrial beats with LBBB. Echo show LVEF 65%, Moderate MR. Past Medical History:   Diagnosis Date    Hypertension             Past Surgical History:   Procedure Laterality Date    HX PACEMAKER PLACEMENT               History reviewed. No pertinent family history. Social History     Social History    Marital status:      Spouse name: N/A    Number of children: N/A    Years of education: N/A     Occupational History    Not on file.      Social History Main Topics    Smoking status: Never Smoker    Smokeless tobacco: Not on file    Alcohol use No    Drug use: Not on file    Sexual activity: Not on file     Other Topics Concern    Not on file     Social History Narrative    No narrative on file         No Known Allergies         Current Facility-Administered Medications   Medication Dose Route Frequency Provider Last Rate Last Dose    iron sucrose (VENOFER) 100 mg in 0.9% sodium chloride 100 mL IVPB  100 mg IntraVENous Q24H Mima Loya MD        docusate sodium (COLACE) capsule 200 mg  200 mg Oral QHS Mima MD Vincenzo        pantoprazole (PROTONIX) tablet 40 mg  40 mg Oral ACB Mima Loya MD   40 mg at 03/05/17 0855    mesalamine (DELZICOL) capsule (DR tablets inside) 400 mg  400 mg Oral TID PRN Mima Loya MD        metoprolol succinate (TOPROL-XL) XL tablet 50 mg  50 mg Oral DAILY Mima Loya MD   50 mg at 03/05/17 0855    simvastatin (ZOCOR) tablet 10 mg  10 mg Oral QHS [facility] MD Vincenzo        solifenacin (VESICARE) tablet 5 mg  5 mg Oral QHS [facility] MD Maritza        sodium chloride (NS) flush 5-10 mL  5-10 mL IntraVENous Q8H Hien Staley MD   10 mL at 03/05/17 0503    sodium chloride (NS) flush 5-10 mL  5-10 mL IntraVENous PRN Hien Staley MD        acetaminophen (TYLENOL) tablet 650 mg  650 mg Oral Q4H PRN Hien Staley MD        naloxone [facility]) injection 0.4 mg  0.4 mg IntraVENous PRN Hien Staley MD        ondansetron [facility]) injection 4 mg  4 mg IntraVENous Q4H PRN Hien Staley MD        doxycycline (VIBRA-TABS) tablet 100 mg  100 mg Oral BID WITH MEALS Hien Staley MD   100 mg at 03/05/17 0855    furosemide (LASIX) injection 40 mg  40 mg IntraVENous BID Hien Staley MD   40 mg at 03/05/17 0854    dilTIAZem CD (CARDIZEM CD) capsule 240 mg  240 mg Oral QHS Ashanti Sepulveda MD   240 mg at 03/04/17 2324    doxepin (SINEquan) capsule 25 mg  25 mg Oral QHS Ashanti Sepulveda MD   25 mg at 03/04/17 2154        ROS:  12 point review of systems was performed. All negative except for HPI     Physical Exam:  Visit Vitals    /59 (BP 1 Location: Right arm, BP Patient Position: Supine)    Pulse 60    Temp 97.5 °F (36.4 °C)    Resp 20    Ht 5' (1.524 m)    Wt 102 lb 6 oz (46.4 kg)    SpO2 100%    BMI 19.99 kg/m2       Gen:  Well-developed, well-nourished, in no acute distress  HEENT:  Pink conjunctivae, PERRL, hearing intact to voice, moist mucous membranes  Neck:  Supple, without masses, thyroid non-tender  Resp:  B/L basal fine rales are present.  No wheezes or rhonchi  Card:  No murmurs, normal S1, S2 without thrills, bruits or peripheral edema  Abd:  Soft, non-tender, non-distended, normoactive bowel sounds are present, no palpable organomegaly and no detectable hernias  Lymph:  No cervical or inguinal adenopathy  Musc:  No cyanosis or clubbing  Skin:  No rashes or ulcers, skin turgor is good  Neuro:  Cranial nerves are grossly intact, no focal motor weakness, follows commands appropriately  Psych:  Good insight, oriented to person, place and time, alert     Labs:     Lab Results  Component Value Date/Time   WBC 4.6 03/05/2017 12:52 AM   Hemoglobin (POC) 10.9 11/11/2010 05:38 PM   HGB 8.1 03/05/2017 12:52 AM   Hematocrit (POC) 32 11/11/2010 05:38 PM   HCT 24.4 03/05/2017 12:52 AM   PLATELET 766 39/64/8166 12:52 AM   MCV 85.0 03/05/2017 12:52 AM       Lab Results  Component Value Date/Time   Glucose 107 03/05/2017 12:52 AM   Glucose (POC) 231 11/15/2010 04:30 PM   LDL, calculated 65.6 11/12/2010 02:44 AM   Creatinine (POC) 1.7 11/11/2010 05:38 PM   Creatinine 1.97 03/05/2017 12:52 AM      Lab Results  Component Value Date/Time   Cholesterol, total 138 11/12/2010 02:44 AM   HDL Cholesterol 43 11/12/2010 02:44 AM   LDL, calculated 65.6 11/12/2010 02:44 AM   Triglyceride 147 11/12/2010 02:44 AM   CHOL/HDL Ratio 3.2 11/12/2010 02:44 AM       Lab Results  Component Value Date/Time   ALT (SGPT) 16 03/04/2017 01:37 PM   AST (SGOT) 19 03/04/2017 01:37 PM   Alk.  phosphatase 80 03/04/2017 01:37 PM   Bilirubin, direct 0.1 11/11/2010 05:55 PM   Bilirubin, total 0.2 03/04/2017 01:37 PM       Lab Results   Component Value Date/Time    INR (POC) 1.1 11/11/2010 05:59 PM      Lab Results  Component Value Date/Time   GFR est AA 29 03/05/2017 12:52 AM   GFR est non-AA 24 03/05/2017 12:52 AM   Creatinine (POC) 1.7 11/11/2010 05:38 PM   Creatinine 1.97 03/05/2017 12:52 AM   BUN 28 03/05/2017 12:52 AM   BUN (POC) 33 11/11/2010 05:38 PM   Sodium (POC) 139 11/11/2010 05:38 PM   Sodium 135 03/05/2017 12:52 AM   Potassium 3.7 03/05/2017 12:52 AM   Potassium (POC) 3.8 11/11/2010 05:38 PM   Chloride (POC) 99 11/11/2010 05:38 PM   Chloride 98 03/05/2017 12:52 AM   CO2 28 03/05/2017 12:52 AM   DIGOXIN 0.47 11/12/2010 02:44 AM      No results found for: KEVIN, Elias Reyes, [id number], [id number], PSALT  Lab Results  Component Value Date/Time   TSH 2.35 11/13/2010 03:45 AM   T4, Free 1.0 11/12/2010 05:06 PM      Lab Results   Component Value Date/Time    Glucose 107 03/05/2017 12:52 AM    Glucose (POC) 231 11/15/2010 04:30 PM      Lab Results   Component Value Date/Time    CK 65 11/12/2010 10:16 AM    CK - MB 2.5 11/12/2010 10:16 AM    CK-MB Index 3.8 11/12/2010 10:16 AM    Troponin-I, Qt. 0.32 03/05/2017 12:52 AM      Lab Results   Component Value Date/Time    NT pro-BNP 6450 03/04/2017 04:12 PM      Lab Results   Component Value Date/Time    Sodium 135 03/05/2017 12:52 AM    Potassium 3.7 03/05/2017 12:52 AM    Chloride 98 03/05/2017 12:52 AM    CO2 28 03/05/2017 12:52 AM    Anion gap 9 03/05/2017 12:52 AM    Glucose 107 03/05/2017 12:52 AM    BUN 28 03/05/2017 12:52 AM    Creatinine 1.97 03/05/2017 12:52 AM    BUN/Creatinine ratio 14 03/05/2017 12:52 AM    GFR est AA 29 03/05/2017 12:52 AM    GFR est non-AA 24 03/05/2017 12:52 AM    Calcium 9.1 03/05/2017 12:52 AM      Lab Results   Component Value Date/Time    Sodium 135 03/05/2017 12:52 AM    Potassium 3.7 03/05/2017 12:52 AM    Chloride 98 03/05/2017 12:52 AM    CO2 28 03/05/2017 12:52 AM    Anion gap 9 03/05/2017 12:52 AM    Glucose 107 03/05/2017 12:52 AM    BUN 28 03/05/2017 12:52 AM    Creatinine 1.97 03/05/2017 12:52 AM    BUN/Creatinine ratio 14 03/05/2017 12:52 AM    GFR est AA 29 03/05/2017 12:52 AM    GFR est non-AA 24 03/05/2017 12:52 AM    Calcium 9.1 03/05/2017 12:52 AM    Bilirubin, total 0.2 03/04/2017 01:37 PM    ALT (SGPT) 16 03/04/2017 01:37 PM    AST (SGOT) 19 03/04/2017 01:37 PM    Alk. phosphatase 80 03/04/2017 01:37 PM    Protein, total 7.5 03/04/2017 01:37 PM    Albumin 3.3 03/04/2017 01:37 PM    Globulin 4.2 03/04/2017 01:37 PM    A-G Ratio 0.8 03/04/2017 01:37 PM      No results found for: HBA1C, SQV5KDPP, HGBE8, TPN4JANU, XPG9ALAU        Recent Labs      03/05/17   0052   TROIQ  0.32*     Diagnostic Tests:   EKG: Atrial Pacind with first degree AVB, LBBB.       Problem List:     Problem List  Date Reviewed: 3/4/2017          Codes Class Noted    * (Principal)Acute pulmonary edema ([facility] ICD-10-CM: J81.0  ICD-9-CM: 518.4  3/4/2017        Troponin level elevated ICD-10-CM: R79.89  ICD-9-CM: 790.6  3/4/2017        Anemia ICD-10-CM: D64.9  ICD-9-CM: 285.9  3/4/2017        ARF (acute renal failure) ([facility]) ICD-10-CM: N17.9  ICD-9-CM: 584.9  3/4/2017        Acute respiratory failure ([facility]) ICD-10-CM: J96.00  ICD-9-CM: 518.81  3/4/2017        CAD (coronary artery disease), native coronary artery ICD-10-CM: I25.10  ICD-9-CM: 414.01  2/4/2011        AF (atrial fibrillation) ([facility]) ICD-10-CM: I48.91  ICD-9-CM: 427.31  2/4/2011        Arrhythmia sinus bradycardia ICD-10-CM: I49.5  ICD-9-CM: 427.81  2/4/2011              Plan:    1. CHF: Diastolic CHF with elevated proBNP and mild interstitial edema. Continue Lasix 40 IV bid for now. I and O. Low salt. BP control. 2. HTN: Continue home diltiazem. 3. Trop mildly positive likely from strain and renal failure. She likely has CAD but LVEF is normal. Identifying severe CAD is not going to . Will continue conservative medical management. 4. PAF: Pacemaker in situ. Continue Lopressor and Dilt. 5. Renal failure. Ja Engel MD, [place] Just

## 2017-03-06 NOTE — PROGRESS NOTES
I met with pt and spoke to pt's son Tiffanie Brooks his cell is [phone]. Pt lives with Tiffanie Brooks and his wife in a one story home with 6 steps to enter the home or she can use the ramp. Pt does not drive she relies on her family for transportation. She is independent with most of her ADLs. She is currently open to [employer] health, will need orders to resume home health and RN to be added to orders if recommended at discharge. Pt has prescription coverage under her insurance plan, she gets her prescriptions filled at [facility] or through mail order. Pt's PCP is Dr. Donald Renee. DME - pt has a walker and a w/c. No other issues or concerns at this time. Thanks SPARKLE Hodgson  Care Management Interventions  PCP Verified by CM:  Yes  Palliative Care Consult (Criteria: CHF and RRAT>21): No  MyChart Signup: No  Discharge Durable Medical Equipment: No  Physical Therapy Consult: Yes  Occupational Therapy Consult: Yes  Speech Therapy Consult: No  Current Support Network: Relative's Home  Confirm Follow Up Transport: Family  Plan discussed with Pt/Family/Caregiver: Yes  Freedom of Choice Offered: Yes  Discharge Location  Discharge Placement: Home

## 2017-03-06 NOTE — CDMP QUERY
2.   Do you concur with the diagnosis of acute on chronic diastolic heart failure as rendered by cardiology      =>Other Explanation of clinical findings  =>Unable to Determine (no explanation of clinical findings)    The medical record reflects the following clinical findings, treatment, and risk factors:    79 y/o female admitted with acute pulmonary edema and acute hypoxic respiratory failure. Dr. Devon Ruffin (cardiology in his notes of 3/5 states \" CHF: Diastolic CHF with elevated proBNP and mild interstitial edema. Continue Lasix 40 IV bid for now. I and O. Low salt. BP control. \"    On 3/6 his notes states \" A/C diastolic CHF: inc pBNP  R pleural effusion on chest xray. Significant MR murmur on exam. Cont IV lasix, low na diet, daily weights. Consider palliative care consult to address goals of care. DNR.\"    She is being treated with IV Lasix 40 mg bid, as well as po metoprolol. Please clarify and document your clinical opinion in the progress notes and discharge summary including the definitive and/or presumptive diagnosis, (suspected or probable), related to the above clinical findings. Please include clinical findings supporting your diagnosis. Thanks for your time.     Eliza Kruger RN, BSN  [phone]

## 2017-03-06 NOTE — PROGRESS NOTES
Problem: Self Care Deficits Care Plan (Adult)  Goal: *Acute Goals and Plan of Care (Insert Text)  Occupational Therapy Goals  Initiated 3/6/2017  1. Patient will perform grooming standing at sink >5 min with no LOB with supervision/set-up within 7 day(s). 2. Patient will perform lower body dressing with supervision/set-up within 7 day(s). 3. Patient will perform bathing with supervision/set-up within 7 day(s). 4. Patient will perform toilet transfers with supervision/set-up within 7 day(s). 5. Patient will perform all aspects of toileting with supervision/set-up within 7 day(s). 6. Patient will participate in upper extremity therapeutic exercise/activities with supervision/set-up for 10 minutes within 7 day(s). 7. Patient will utilize energy conservation techniques during functional activities with verbal cues within 7 day(s). OCCUPATIONAL THERAPY EVALUATION  Patient: Stefani Merida (36 y.o. female)  Date: 3/6/2017  Primary Diagnosis: Acute respiratory failure ([address]        Precautions:   Fall, DNR      ASSESSMENT :  Based on the objective data described below, the patient presents with decreased RW safety, balance, strength, and endurance with regard to functional mobility and ADL performance. PTA pt lived with her son and daughter-in-law in a 1 story home, she ambulated with a RW for short distances and W/C for longer distances outside of the home, she enjoys cooking, and was independent in her self-care. Pt reports her son does assist her her with mobility at times she feels she needs it, but that he is also busy taking care of his wife who has MS. Pt requires overall min A for mobility and ADLs due to decreased balance, pt responds to verbal and tactile cues for RW safety. Pt required mod verbal cues to remain within the RW to increase safety during in-room mobility. Educated pt on PLB and energy conservation techniques, pt agreeable to education.  Pt would benefit from skilled OT to improve overall independence in mobility and ADL performance. Recommend HH at this time to increase pt safety within her home and ADLs. Patient will benefit from skilled intervention to address the above impairments. Patients rehabilitation potential is considered to be Good  Factors which may influence rehabilitation potential include:   [X]             None noted  [ ]             Mental ability/status  [ ]             Medical condition  [ ]             Home/family situation and support systems  [ ]             Safety awareness  [ ]             Pain tolerance/management  [ ]             Other:        PLAN :  Recommendations and Planned Interventions:  Kathi ]               Self Care Training                  Kathi ]        Therapeutic Activities  [ X]               Functional Mobility Training    [ ]        Cognitive Retraining  [ X]               Therapeutic Exercises           [ Alphonsus Lark  [ Xavier Wallace Training                   [ ]        Neuromuscular Re-Education  [ ]               Visual/Perceptual Training     [ Elbridge Leas Training  [ Serene Lapine               Patient Education                 [ Magdaline Re Training/Education  [ ]               Other (comment):     Frequency/Duration: Patient will be followed by occupational therapy 3 times a week to address goals.   Discharge Recommendations: Home Health vs rehab depending on available physical assist at home  Further Equipment Recommendations for Discharge: TBD       SUBJECTIVE:   Patient stated I am feeling weaker than usual.      OBJECTIVE DATA SUMMARY:   HISTORY:   Past Medical History:   Diagnosis Date    Hypertension       Past Surgical History:   Procedure Laterality Date    HX PACEMAKER PLACEMENT            Prior Level of Function/Home Situation:  PTA pt lived with her son and daughter-in-law in a 1 story home, she ambulated with a RW for short distances and W/C for longer distances outside of the home, she enjoys cooking, and was independent in her self-care. Pt reports her son does assister her with mobility at times she feels she needs it, but that he is also busy taking care of his wife who had MS. Home Situation  Home Environment: Private residence  # Steps to Enter: 4  Rails to Enter: Yes  Hand Rails : Right  One/Two Story Residence: One story  Living Alone: No  Support Systems: Child(mikael), Family member(s)  Patient Expects to be Discharged to[de-identified] Private residence  Current DME Used/Available at Home: Jaz Weber, Albaro kessler, [address] [facility] [address] chair, Wheelchair, Grab bars, Blood pressure cuff, Commode, bedside, Raised toilet seat  Tub or Shower Type: Shower  [X]  Right hand dominant             [ ]  Left hand dominant     EXAMINATION OF PERFORMANCE DEFICITS:  Cognitive/Behavioral Status:  Neurologic State: Alert; Appropriate for age  Orientation Level: Oriented X4  Cognition: Appropriate for age attention/concentration; Follows commands  Perception: Appears intact  Perseveration: No perseveration noted  Safety/Judgement: Awareness of environment; Fall prevention; Insight into deficits     Vision/Perceptual:         Acuity: Within Defined Limits    Corrective Lenses: Glasses      Range of Motion:  AROM: Generally decreased, functional in the uppers; (decreased sitting balance, posterior lean) pt is able to reach bilateral feet seated EOB bending forward  PROM: Generally decreased, functional      Strength:  Strength: Generally decreased, functional      Coordination:  Coordination: Within functional limits  Fine Motor Skills-Upper: Left Intact; Right Intact    Gross Motor Skills-Upper: Left Intact; Right Intact      Tone & Sensation:  Tone: Normal  Sensation: Intact     Balance:  Sitting: Impaired  Sitting - Static: Good (unsupported)  Sitting - Dynamic: Fair (occasional)  Standing: Impaired; With support  Standing - Static: Fair  Standing - Dynamic : Fair     Functional Mobility and Transfers for ADLs:  Bed Mobility: decreased balance, and additional time to come to EOB  Rolling: Minimum assistance  Supine to Sit: Minimum assistance  Scooting: Minimum assistance (decreased balance)     Transfers:  Sit to Stand: Minimum assistance;Assist x1 (RW; cues for walker safety)  Stand to Sit: Minimum assistance (decreased balance, safe hand placement)  Bed to Chair: Minimum assistance  Toilet Transfer : Minimum assistance (cues to remain w/in RW for increased safety)     ADL Assessment:  Feeding: Setup     Oral Facial Hygiene/Grooming: Minimum assistance (standing at sink w/ RW)     Bathing: Minimum assistance (decreased balance; to safely clean BLEs)     Upper Body Dressing: Contact guard assistance (posterior LOB seated at EOB)     Lower Body Dressing: Minimum assistance (balance for pants up/down)     Toileting: Minimum assistance (balance, clothing management)     ADL Intervention and task modifications:   Cognitive Retraining  Safety/Judgement: Awareness of environment; Fall prevention; Insight into deficits     Functional Measure:  Barthel Index:      Bathin  Bladder: 5  Bowels: 5  Groomin  Dressin  Feedin  Mobility: 5  Stairs: 0  Toilet Use: 5  Transfer (Bed to Chair and Back): 10  Total: 40         Barthel and G-code impairment scale:  Percentage of impairment CH  0% CI  1-19% CJ  20-39% CK  40-59% CL  60-79% CM  80-99% CN  100%   Barthel Score 0-100 100 99-80 79-60 59-40 20-39 1-19    0   Barthel Score 0-20 20 17-19 13-16 9-12 5-8 1-4 0      The Barthel ADL Index: Guidelines  1. The index should be used as a record of what a patient does, not as a record of what a patient could do. 2. The main aim is to establish degree of independence from any help, physical or verbal, however minor and for whatever reason. 3. The need for supervision renders the patient not independent. 4. A patient's performance should be established using the best available evidence.  Asking the patient, friends/relatives and nurses are the usual sources, but direct observation and common sense are also important. However direct testing is not needed. 5. Usually the patient's performance over the preceding 24-48 hours is important, but occasionally longer periods will be relevant. 6. Middle categories imply that the patient supplies over 50 per cent of the effort. 7. Use of aids to be independent is allowed. Damion Hodges., Barthel, D.W. (3970). Functional evaluation: the Barthel Index. [address] (14)2. Karla Ricardo will CASIE Campos, Fritz Weber., Marvin Rivera., Dariana Figueroa, [facility] (1999). Measuring the change indisability after inpatient rehabilitation; comparison of the responsiveness of the Barthel Index and Functional [place] Measure. Journal of Neurology, Neurosurgery, and Psychiatry, 66(4), 795-284. WILSON Dougherty, KIMMIE Gonzales, & Francia Poole MKEON. (2004.) Assessment of post-stroke quality of life in cost-effectiveness studies: The usefulness of the Barthel Index and the EuroQoL-5D. Quality of Life Research, 13, 558-08            G codes: In compliance with CMSs Claims Based Outcome Reporting, the following G-code set was chosen for this patient based on their primary functional limitation being treated: The outcome measure chosen to determine the severity of the functional limitation was the Barthel Index with a score of 40/100 which was correlated with the impairment scale.       · Self Care:               - CURRENT STATUS:    CK - 40%-59% impaired, limited or restricted               - GOAL STATUS:           CJ - 20%-39% impaired, limited or restricted               - D/C STATUS:                       ---------------To be determined---------------      Occupational Therapy Evaluation Charge Determination   History Examination Decision-Making   LOW Complexity : Brief history review  LOW Complexity : 1-3 performance deficits relating to physical, cognitive , or psychosocial skils that result in activity limitations and / or participation restrictions  LOW Complexity : No comorbidities that affect functional and no verbal or physical assistance needed to complete eval tasks       Based on the above components, the patient evaluation is determined to be of the following complexity level: LOW   Activity Tolerance:   Good  After treatment:   [X] Patient left in no apparent distress sitting up in chair  [ ] Patient left in no apparent distress in bed  [X] Call bell left within reach  [X] Nursing notified  [ ] Caregiver present  [ ] Bed alarm activated      COMMUNICATION/EDUCATION:   The patients plan of care was discussed with: Registered Nurse.  [X] Home safety education was provided and the patient/caregiver indicated understanding. [X] Patient have participated as able in goal setting and plan of care. [X] Patient agree to work toward stated goals and plan of care. [ ] Patient understands intent and goals of therapy, but is neutral about his/her participation. [ ] Patient is unable to participate in goal setting and plan of care. This patients plan of care is appropriate for delegation to [facility]. Thank you for this referral.  Ana Paula Escalera. DAREK Knowles and Renita Rowe, OTR/L, CBIS  Time Calculation: 24 mins     Regarding student involvement in patient care:  A student participated in this treatment session. Per CMS Medicare statements and AOTA guidelines I certify that the following was true:  1. I was present and directly observed the entire session. 2. I made all skilled judgments and clinical decisions regarding care. 3. I am the practitioner responsible for assessment, treatment, and documentation.

## 2017-03-06 NOTE — PROGRESS NOTES
I notified [facility] that pt may be discharged today. They can accept the patient upon discharge. If pt is dc this evening, I can send the home health orders tomorrow - this doesn't have to hold up pt's discharge.  Thanks SPARKLE Campo

## 2017-03-06 NOTE — CARDIO/PULMONARY
Cardiac Rehab: 79 yo female admitted with weakness & SOB (3/4). Per Dr Traci Duran, dx includes: Acute/chronic diastolic CHF. Elevated troponin 0.32, likely due to strain & renal failure. LVEF 65% (2010). Pt is followed by Dr Spring Chavez in [facility]. 3/6/2017 Met with patient, who was sitting up in chair on [facility]. Pt reported she lives in [place], with her only child ([address]) & his wife, who has MS. Pt reported she did not work outside the home. She is the second youngest of ten children; her younger sister has dementia and resides with family in [place]. Discussed pt's understanding of her current condition, tx plan and cardiac hx. Pt reported she has a PPM and \"quivering heart. \" Briefly reviewed AFib. Explained EF and diastolic dysfunction. Gave/reviewed CHF teaching packet.    Info attached to AVS on CHF. Cardiac Meds:   ACE/ARB - none  BB - metoprolol  Statin - simvastatin  ASA - 81 mg  Prior to admission meds also included: Plavix, diltiazem, and Lasix. Diet Education: 3/6/2017 Currently on Cardiac 2 gm NA diet. Gave/reviewed handouts on Low Sodium diet (<1500 mg Na/day) & Sodium Food/Beverage Zones. Hgb A1c unavailable. BMI 19.7. Pt reported she sometimes adds \"a little\" salt to her food. Explained rationale for limiting salt intake.    Fluid Intake Education: 3/6/2017 Admission creat 2.12 & . Pt reported daily fluid intake includes: 8 oz regular coffee, occasional 4 oz OJ, occasional 8 oz whole milk, 12 oz regular Coke, and 16-20 oz water. Estimated pt total fluid intake <1.5 L/day. Explained importance of adequate fluid intake to prevent dehydration, while also avoiding fluid overload. Daily Weights: 3/6/2017 Pt reported she  has a scale; indicated she did not know she was supposed to weigh everyday. Discussed rationale for monitoring daily wts, recording wts on a calendar, and contacting MD if gains 3 lbs in one day, or 5 lbs in one week.   Smoking Cessation: Never smoked. Medication Education: 3/6/2017 Reviewed current meds. Pt has a general idea of purpose of her meds. She was unclear on why she needs Plavix. Stated, \"I guess my blood is too thick. \" Pt denied any difficulties obtaining or taking her meds. Uses weekly pill box. Reinforced med compliance.    New Scheduled PO Meds this admission: 3/6/2017 Doxycycline & ferrous sulfate. Info attached to AVS on new PO meds. Concern for Knowledge Deficit: 3/6/2017 Patient verbalized basic understanding of info provided. She would likely benefit from reinforcement on all topics discussed. All questions were answered. Recommend teaching with family present, due to pt's decreased memory. 1.  During this hospital stay did staff take your preferences and those of your family (or caregiver) into account in deciding your individual heart failure needs, and in deciding when you left the hospital?  2.  Do you have a good understanding of the things you are responsible for in managing heart failure?   3.  Do you clearly understand the purpose for taking each medication?

## 2017-03-06 NOTE — PROGRESS NOTES
Problem: Mobility Impaired (Adult and Pediatric)  Goal: *Acute Goals and Plan of Care (Insert Text)  Physical Therapy Goals  Initiated 3/5/2017  1. Patient will move from supine to sit and sit to supine in bed with independence within 7 day(s). 2. Patient will transfer from bed to chair and chair to bed with modified independence using the least restrictive device within 7 day(s). 3. Patient will perform sit to stand with modified independence within 7 day(s). 4. Patient will ambulate with modified independence for 50 feet with the least restrictive device within 7 day(s). 5. Patient will ascend/descend 5 stairs with single handrail(s) with supervision/set-up within 7 day(s). PHYSICAL THERAPY TREATMENT  Patient: Anayeli Rucker (2 y.o. female)  Date: 3/6/2017  Diagnosis: Acute respiratory failure (HCC) Acute pulmonary edema (HCC)       Precautions: Fall, DNR  Chart, physical therapy assessment, plan of care and goals were reviewed. ASSESSMENT:  Pt seen for 6 minute walk. Pt comes to stand with min assist.Pt ambulated 40ft with RW min assist of 1. Pt needs cues to take full step lengths as she tends to drag left foot. Pt is SPO2 was 91% and above with mobility. (see chart below). Pt left sitting. Continue goals. Progression toward goals:  [ ]      Improving appropriately and progressing toward goals  [X]      Improving slowly and progressing toward goals  [ ]      Not making progress toward goals and plan of care will be adjusted       PLAN:  Patient continues to benefit from skilled intervention to address the above impairments. Continue treatment per established plan of care. Discharge Recommendations:  Home Health with assistance/supervision.   Further Equipment Recommendations for Discharge:  rolling walker       SUBJECTIVE:          OBJECTIVE DATA SUMMARY:   Critical Behavior:  Neurologic State: Alert, Appropriate for age  Orientation Level: Oriented X4  Cognition: Appropriate for age attention/concentration, Follows commands  Safety/Judgement: Awareness of environment, Fall prevention, Insight into deficits  Functional Mobility Training:  Bed Mobility:  Rolling: Minimum assistance  Supine to Sit: Minimum assistance     Scooting: Minimum assistance (decreased balance)                    Transfers:  Sit to Stand: Minimum assistance;Assist x1 (RW; cues for walker safety)  Stand to Sit: Minimum assistance (decreased balance, safe hand placement)        Bed to Chair: Minimum assistance                    Balance:  Sitting: Intact  Sitting - Static: Good (unsupported)  Sitting - Dynamic: Fair (occasional)  Standing: Intact; With support  Standing - Static: Fair  Standing - Dynamic : Fair  Ambulation/Gait Training:  Distance (ft): 40 Feet (ft)  Assistive Device: Gait belt;Walker, rolling  Ambulation - Level of Assistance: Minimal assistance        Gait Abnormalities: Decreased step clearance; Path deviations              Speed/Sujatha: Pace decreased (<100 feet/min)  Step Length: Left shortened;Right shortened       Documentation for home O2:     ROOM AIR     AT REST    O2 SATS  95%     ROOM AIR WITH ACTIVITY 02 SATS  91%     (0    ) LITERS OF O2 WITH ACTIVITY O2 SATS  91%     (0    )LITERS OF 02 PATIENT LEFT COMFORTABLY  SITTING/SUPINE 02 SATS  92%                                  Pain:  Pain Scale 1: Numeric (0 - 10)  Pain Intensity 1: 0              Activity Tolerance:   Pt tolerated treatment well. Please refer to the flowsheet for vital signs taken during this treatment.   After treatment:   [X] Patient left in no apparent distress sitting up in chair  [ ] Patient left in no apparent distress in bed  [ ] Call bell left within reach  [ ] Nursing notified  [ ] Caregiver present  [ ] Bed alarm activated      COMMUNICATION/COLLABORATION:   The patients plan of care was discussed with: Physical Therapist     Claudeen Dunk, PTA   Time Calculation: 23 mins

## 2017-03-06 NOTE — CDMP QUERY
1.    Please clarify if this patient is being treated/managed for:    =>Acute renal failure on CKD 4  =>CKD 4  =>Other Explanation of clinical findings  =>Unable to Determine (no explanation of clinical findings)    The medical record reflects the following clinical findings, treatment, and risk factors:    81 y/o female admitted for acute pulmonary edema. In the H and P the diagnosis of acute renal failure v CKD is rendered. On admit serum creatinine is 2.12 with corresponding GFR of 22, repeat values are 1.97 and 24. Most recent value of 11/2010 is 1.7 and 30. Stage 1: > 90  Stage 2:  60-89  Stage 3: 30-59  Stage 4: 15-29  Stage 5: < 15      Please clarify and document your clinical opinion in the progress notes and discharge summary including the definitive and/or presumptive diagnosis, (suspected or probable), related to the above clinical findings. Please include clinical findings supporting your diagnosis. Thanks for your time.     Georgina Davila RN, BSN  [phone]

## 2017-03-06 NOTE — PROGRESS NOTES
Shift Report:    0725: Verbal bedside report received from Sarwat Resendez RN. Patient is resting quietly in the bed at this time with no complaints. 0820: Completed patient vitals and assessment. Patient dropped her water cup in her bed and soaked her linens and gown, which were changed. Patient assisted in ordering meal tray and also provided her clean dentures. No other concerns at this time. 0845: OT at bedside to see patient. 1015: Patient was placed in chair by OT. OT reports that patient is a minimal assist, but requires encouragement. She has completed her meal tray. She denies any needs or complaints at this time. 1550: Notified Dr. Cleon Kawasaki that patient completed 6-min walk. She is going to complete discharge orders for patient. Notified patient's daughter-in-law of pending discharge and she reports that she can be at the hospital in 2 hrs. 1830: Discharge instructions completed with patient and son, Nicole Garza. Educated patient on s/s of chf exacerbation and recommended that patient weigh herself daily. Gave prescriptions for doxycycline and iron. Patient and her son was provided opportunity for questions. Patient was assisted in dressing, and volunteer assistance escorted patient off unit to son's vehicle via wheelchair.

## 2017-03-06 NOTE — DISCHARGE INSTRUCTIONS
HOSPITALIST DISCHARGE INSTRUCTIONS  NAME: Claudia Garcia   :  3/2/1924   MRN:  [id number]     Date/Time:  3/6/2017 2:34 PM    ADMIT DATE: 3/4/2017     DISCHARGE DATE: 3/6/2017     ADMITTING DIAGNOSIS:  Hypoxia likely 2/2 volume overload     DISCHARGE DIAGNOSIS:  As above     MEDICATIONS:     · It is important that you take the medication exactly as they are prescribed. · Keep your medication in the bottles provided by the pharmacist and keep a list of the medication names, dosages, and times to be taken in your wallet. · Do not take other medications without consulting your doctor. Pain Management: per above medications    What to do at Home    Recommended diet:  Cardiac Diet    Recommended activity: Activity as tolerated    If you experience any of the following symptoms then please call your primary care physician or return to the emergency room if you cannot get hold of your doctor:  Fever, chills, nausea, vomiting, diarrhea, change in mentation, falling, bleeding, shortness of breath, chest pain     Follow Up:  Please follow-up with PCP in 1-2 weeks     Information obtained by :  I understand that if any problems occur once I am at home I am to contact my physician. I understand and acknowledge receipt of the instructions indicated above. Physician's or R.N.'s Signature                                                                  Date/Time                                                                                                                                              Patient or Representative Signature                                                          Date/Time  Heart Failure: Care Instructions  Your Care Instructions     Heart failure occurs when your heart does not pump as much blood as the body needs.  Failure does not mean that the heart has stopped pumping but rather that it is not pumping as well as it should. Over time, this causes fluid buildup in your lungs and other parts of your body. Fluid buildup can cause shortness of breath, fatigue, swollen ankles, and other problems. By taking medicines regularly, reducing sodium (salt) in your diet, checking your weight every day, and making lifestyle changes, you can feel better and live longer. Follow-up care is a key part of your treatment and safety. Be sure to make and go to all appointments, and call your doctor if you are having problems. It's also a good idea to know your test results and keep a list of the medicines you take. How can you care for yourself at home? Medicines  · Be safe with medicines. Take your medicines exactly as prescribed. Call your doctor if you think you are having a problem with your medicine. · Do not take any vitamins, over-the-counter medicine, or herbal products without talking to your doctor first. Leopoldo Moloney not take ibuprofen (Advil or Motrin) and naproxen (Aleve) without talking to your doctor first. They could make your heart failure worse. · You may be taking some of the following medicine. ¨ Beta-blockers can slow heart rate, decrease blood pressure, and improve your condition. Taking a beta-blocker may lower your chance of needing to be hospitalized. ¨ Angiotensin-converting enzyme inhibitors (ACEIs) reduce the heart's workload, lower blood pressure, and reduce swelling. Taking an ACEI may lower your chance of needing to be hospitalized again. ¨ Angiotensin II receptor blockers (ARBs) work like ACEIs. Your doctor may prescribe them instead of ACEIs. ¨ Diuretics, also called water pills, reduce swelling. ¨ Potassium supplements replace this important mineral, which is sometimes lost with diuretics. ¨ Aspirin and other blood thinners prevent blood clots, which can cause a stroke or heart attack.   You will get more details on the specific medicines your doctor prescribes. Diet  · Your doctor may suggest that you limit sodium to 1,500 milligrams (mg) a day. That is less than 1 teaspoon of salt a day, including all the salt you eat in cooking or in packaged foods. People get most of their sodium from processed foods. Fast food and restaurant meals also tend to be very high in sodium. · Ask your doctor how much liquid you can drink each day. You may have to limit liquids. Weight  · Weigh yourself without clothing at the same time each day. Record your weight. Call your doctor if you gain more than 3 pounds in 24 hours or 5 pounds in one week. A sudden weight gain may mean that your heart failure is getting worse. Activity level  · Start light exercise (if your doctor says it is okay). Even if you can only do a small amount, exercise will help you get stronger, have more energy, and manage your weight and your stress. Walking is an easy way to get exercise. Start out by walking a little more than you did before. Bit by bit, increase the amount you walk. · When you exercise, watch for signs that your heart is working too hard. You are pushing yourself too hard if you cannot talk while you are exercising. If you become short of breath or dizzy or have chest pain, stop, sit down, and rest.  · If you feel \"wiped out\" the day after you exercise, walk slower or for a shorter distance until you can work up to a better pace. · Get enough rest at night. Sleeping with 1 or 2 pillows under your upper body and head may help you breathe easier. Lifestyle changes  · Do not smoke. Smoking can make a heart condition worse. If you need help quitting, talk to your doctor about stop-smoking programs and medicines. These can increase your chances of quitting for good. Quitting smoking may be the most important step you can take to protect your heart. · Limit alcohol to 2 drinks a day for men and 1 drink a day for women. Too much alcohol can cause health problems.   · Avoid getting sick from colds and the flu. Get a pneumococcal vaccine shot. If you have had one before, ask your doctor whether you need another dose. Get a flu shot each year. If you must be around people with colds or the flu, wash your hands often. When should you call for help? Call 911 if you have symptoms of sudden heart failure such as:  · You have severe trouble breathing. · You cough up pink, foamy mucus. · You have a new irregular or rapid heartbeat. Call your doctor now or seek immediate medical care if:  · You have new or increased shortness of breath. · You are dizzy or lightheaded, or you feel like you may faint. · You have sudden weight gain, such as 3 pounds in 24 hours, or 5 pounds in one week. · You have increased swelling in your legs, ankles, or feet. · You are suddenly so tired or weak that you cannot do your usual activities. Watch closely for changes in your health, and be sure to contact your doctor if:  · You develop new symptoms. Where can you learn more? Go to Tame[employer]  Enter W935 in the search box to learn more about \"Heart Failure: Care Instructions. \"   © 7313-9855 Healthwise, Incorporated. Care instructions adapted under license by Cordelia Oconnor (which disclaims liability or warranty for this information). This care instruction is for use with your licensed healthcare professional. If you have questions about a medical condition or this instruction, always ask your healthcare professional. [place] [address] any warranty or liability for your use of this information. Content Version: 48.7.016935; Current as of: February 20, 2015 (modified 9/26/16).

## 2017-03-06 NOTE — PROGRESS NOTES
Cardiology Progress Note       [facility]  NAME:  Ruiz Hyatt   :   3/2/1924   MRN:   [id number]     Assessment/Plan:   1. A/C diastolic CHF: inc pBNP  R pleural effusion on chest xray. Significant MR murmur on exam. Cont IV lasix, low na diet, daily weights. Consider palliative care consult to address goals of care. DNR. 2. MR:   3. HTN: Continue home diltiazem. 4. Trop mildly positive likely from strain and renal failure. She likely has CAD but LVEF is normal. Identifying severe CAD is not going to . Will continue conservative medical management. Plavix held due to hgb. 5. PAF: Pacemaker. Continue Lopressor and Dilt. 6. Renal failure: watch with diuretics      Cardiology Attending:    Patient personally seen and examined. All the elements of history and examination were personally performed. Assessment and plan was discussed and agree as written above. Ja Haile MD, [facility] [place]         Subjective:   Ruiz Hyatt is a 80 y.o. female with PMH of HTN, CAD and PAF admitted with symptoms of increasing SOB, cough and sputum. She has a pacemaker but does not know who implanted. She is no on anticoagulation. No chest pain, fever or chills. She was noted to have mild Trop elevation which is flat with underlying renal failure.      EKG show Paced atrial beats with LBBB. Echo show LVEF 65%, Moderate MR.       chest xray Small right pleural effusion. Questionable pulmonary edema. Cardiac ROS: Patient denies any exertional chest pain, dyspnea, palpitations, syncope, orthopnea, edema or paroxysmal nocturnal dyspnea. Overnight events:  No weight recorded  I/O incomplete   Cr 1.9  Trop . 32     Previous Cardiac Eval   ECHO:  LVEF 65%     Right ventricle: The size was normal. Systolic function was normal.    Left atrium: The atrium was mildly dilated. Mitral valve: There was mild annular calcification. There was moderate  regurgitation.  Regurgitation grade was 3+ on a scale of 0 to 4+. Aortic valve: Normal valve structure. Leaflets exhibited mild  calcification and sclerosis. Review of Systems: No nausea, indigestion, vomiting, pain, cough, sputum. No bleeding. Taking po. Appetite fair. OOB with assist.           Objective:     Visit Vitals    /59 (BP 1 Location: Right arm, BP Patient Position: At rest)    Pulse 60    Temp 97.8 °F (36.6 °C)    Resp 18    Ht 5' (1.524 m)    Wt 102 lb 6 oz (46.4 kg)    SpO2 99%    BMI 19.99 kg/m2    O2 Flow Rate (L/min): 2 l/min O2 Device: Nasal cannula    Temp (24hrs), Av.5 °F (36.4 °C), Min:97.4 °F (36.3 °C), Max:97.8 °F (36.6 °C)            1901 -  0700  In: 750 [P.O.:550; I.V.:200]  Out: 1600 [Urine:1600]  TELE: A paced     General: AAOx3 cooperative, no acute distress. HEENT: Atraumatic. Pink and moist.  Anicteric sclerae. Neck : Supple, no thyromegaly. Lungs: fine rales R base. Heart: Regular rhythm, 3/6 systolic murmur, no rubs, no gallops. No JVD. No carotid bruits. Abdomen: Soft, non-distended, non-tender. + Bowel sounds. No bruits. Extremities: No edema, no clubbing, no cyanosis. No calf tenderness  Neurologic: Grossly intact. Alert and oriented X 3. No acute neurological distress. Psych: Fair insight. Not anxious or agitated. Care Plan discussed with:    Comments   Patient x    Family      RN x    Care Manager                    Consultant:          Data Review:     No lab exists for component: ITNL   Recent Labs      17   00517   1612  17   1337   TROIQ  0.32*  0.16*  0.17*     Recent Labs      17   00517   1337   NA  135*  131*   K  3.7  4.0   CL  98  94*   CO2  28  31   BUN  28*  30*   CREA  1.97*  2.12*   GLU  107*  103*   MG  2.6*   --    ALB   --   3.3*   WBC  4.6  5.2   HGB  8.1*  8.2*   HCT  24.4*  25.2*   PLT  140*  145*     No results for input(s): INR, PTP, APTT in the last 72 hours.     No lab exists for component: INREXT    Medications reviewed  Current Facility-Administered Medications   Medication Dose Route Frequency    senna-docusate (PERICOLACE) 8.6-50 mg per tablet 1 Tab  1 Tab Oral DAILY    iron sucrose (VENOFER) 100 mg in 0.9% sodium chloride 100 mL IVPB  100 mg IntraVENous Q24H    docusate sodium (COLACE) capsule 200 mg  200 mg Oral QHS    pantoprazole (PROTONIX) tablet 40 mg  40 mg Oral ACB    mesalamine (DELZICOL) capsule (DR tablets inside) 400 mg  400 mg Oral TID PRN    metoprolol succinate (TOPROL-XL) XL tablet 50 mg  50 mg Oral DAILY    simvastatin (ZOCOR) tablet 10 mg  10 mg Oral QHS    solifenacin (VESICARE) tablet 5 mg  5 mg Oral QHS    artificial tears (dextran 70-hypromellose) (NATURAL BALANCE) 0.1-0.3 % ophthalmic solution 1 Drop  1 Drop Both Eyes PRN    sodium chloride (NS) flush 5-10 mL  5-10 mL IntraVENous Q8H    sodium chloride (NS) flush 5-10 mL  5-10 mL IntraVENous PRN    acetaminophen (TYLENOL) tablet 650 mg  650 mg Oral Q4H PRN    naloxone (NARCAN) injection 0.4 mg  0.4 mg IntraVENous PRN    ondansetron (ZOFRAN) injection 4 mg  4 mg IntraVENous Q4H PRN    doxycycline (VIBRA-TABS) tablet 100 mg  100 mg Oral BID WITH MEALS    furosemide (LASIX) injection 40 mg  40 mg IntraVENous BID    dilTIAZem CD (CARDIZEM CD) capsule 240 mg  240 mg Oral QHS    doxepin (SINEquan) capsule 25 mg  25 mg Oral QHS         Kvng Montanez NP

## 2017-03-06 NOTE — ROUTINE PROCESS
Bedside and Verbal shift change report given to Deon Zaman (oncoming nurse) by Alicia Nassar RN (offgoing nurse). Report included the following information SBAR, Kardex, Intake/Output, MAR, Accordion and Recent Results. Bedside and Verbal shift change report given to Alicia Nassar RN (oncoming nurse) by Amelie Kapadia RN (offgoing nurse). Report included the following information SBAR.

## 2017-03-07 NOTE — PROGRESS NOTES
Chart reviewed, home health orders sent to DIPAK MACEDO [facility].  Thanks SPARKLE Neal

## 2017-05-29 PROBLEM — N18.4 CKD (CHRONIC KIDNEY DISEASE) STAGE 4, GFR 15-29 ML/MIN (HCC): Chronic | Status: ACTIVE | Noted: 2017-01-01

## 2017-05-29 PROBLEM — I50.32 DIASTOLIC CHF, CHRONIC (HCC): Chronic | Status: ACTIVE | Noted: 2017-01-01

## 2017-05-29 PROBLEM — R53.1 WEAKNESS: Status: ACTIVE | Noted: 2017-01-01

## 2017-05-29 PROBLEM — D64.9 SYMPTOMATIC ANEMIA: Status: ACTIVE | Noted: 2017-01-01

## 2017-05-29 PROBLEM — N39.0 UTI (URINARY TRACT INFECTION): Status: ACTIVE | Noted: 2017-01-01

## 2017-05-29 PROBLEM — N17.9 AKI (ACUTE KIDNEY INJURY) (HCC): Status: ACTIVE | Noted: 2017-01-01

## 2017-05-29 NOTE — IP AVS SNAPSHOT
[address] 
[phone] Patient: Jos Garcia MRN: [id number] :3/2/1924 You are allergic to the following No active allergies Recent Documentation Height Weight Breastfeeding? BMI OB Status Smoking Status 1.499 m 47.6 kg Unknown 21.21 kg/m2 Hysterectomy Never Smoker Unresulted Labs Order Current Status HAPTOGLOBIN In process HEPATITIS C AB In process Emergency Contacts Name Discharge Info Relation Home Work Mobile [facility] DISCHARGE CAREGIVER [3] Son [22] [phone] About your hospitalization You were admitted on:  May 29, 2017 You last received care in the:  [facility] 5M1 MED SURG 1 You were discharged on:  2017 Unit phone number:  [phone] Why you were hospitalized Your primary diagnosis was:  Symptomatic Anemia Your diagnoses also included:  Weakness, Uti (Urinary Tract Infection), Af (Atrial Fibrillation) (Hcc), Cad (Coronary Artery Disease), Native Coronary Artery, Danie (Acute Kidney Injury) (Hcc), Ckd (Chronic Kidney Disease) Stage 4, Gfr 15-29 Ml/Min (Hcc), Diastolic Chf, Chronic (Hcc), Thrombocytopenia (Hcc) Providers Seen During Your Hospitalizations Provider Role Specialty Primary office phone Ceci Sears MD Attending Provider Emergency Medicine [phone] Arcelia Fry MD Attending Provider Internal Medicine [phone] Your Primary Care Physician (PCP) Primary Care Physician Office Phone Office Fax Gely Daly [phone] Follow-up Information Follow up With Details Comments Contact Info Donald Lyman MD    [facility],[address] [address] [phone] Current Discharge Medication List  
  
START taking these medications Dose & Instructions Dispensing Information Comments  Morning Noon Evening Bedtime  
 cefdinir 300 mg capsule Commonly known as:  OMNICEF Your last dose was: Your next dose is:    
   
   
 Dose:  300 mg Take 1 Cap by mouth two (2) times a day for 3 days. Quantity:  6 Cap Refills:  0 CONTINUE these medications which have NOT CHANGED Dose & Instructions Dispensing Information Comments Morning Noon Evening Bedtime Shirly Bamberger Generic drug:  ceramides 1,3,6-11 Your last dose was: Your next dose is:    
   
   
 by Apply Externally route daily as needed (dry skin). Refills:  0  
     
   
   
   
  
 COLACE 100 mg capsule Generic drug:  docusate sodium Your last dose was: Your next dose is:    
   
   
 Dose:  200 mg Take 200 mg by mouth as needed. Refills:  0 DELZICOL 400 mg Cdti capsule Generic drug:  mesalamine Your last dose was: Your next dose is:    
   
   
 Dose:  400 mg Take 400 mg by mouth three (3) times daily as needed (loose stools). Refills:  0  
     
   
   
   
  
 dilTIAZem  mg ER capsule Commonly known as:  CARDIZEM CD Your last dose was: Your next dose is:    
   
   
 Dose:  120 mg Take 120 mg by mouth daily. Refills:  0  
     
   
   
   
  
 * doxepin 25 mg capsule Commonly known as:  SINEquan Your last dose was: Your next dose is:    
   
   
 Dose:  25 mg Take 25 mg by mouth nightly. Refills:  0  
     
   
   
   
  
 * doxepin 25 mg capsule Commonly known as:  SINEquan Your last dose was: Your next dose is:    
   
   
 Dose:  50 mg Take 50 mg by mouth nightly as needed. Refills:  0  
     
   
   
   
  
 furosemide 40 mg tablet Commonly known as:  LASIX Your last dose was: Your next dose is:    
   
   
 Dose:  40 mg Take 40 mg by mouth daily.   
 Refills:  0  
     
   
   
   
  
 methenamine hippurate 1 gram tablet Commonly known as:  HIPREX Your last dose was: Your next dose is:    
   
   
 Dose:  1 g Take 1 g by mouth two (2) times daily (with meals). Refills:  0 NexIUM 40 mg capsule Generic drug:  esomeprazole Your last dose was: Your next dose is:    
   
   
 Dose:  40 mg Take 40 mg by mouth Daily (before breakfast). Refills:  0  
     
   
   
   
  
 simvastatin 10 mg tablet Commonly known as:  ZOCOR Your last dose was: Your next dose is:    
   
   
 Dose:  10 mg Take 10 mg by mouth nightly. Refills:  0  
     
   
   
   
  
 zolpidem 5 mg tablet Commonly known as:  AMBIEN Your last dose was: Your next dose is:    
   
   
 Dose:  5 mg Take 5 mg by mouth nightly. Refills:  0  
     
   
   
   
  
 * Notice: This list has 2 medication(s) that are the same as other medications prescribed for you. Read the directions carefully, and ask your doctor or other care provider to review them with you. STOP taking these medications   
 clopidogrel 75 mg Tab Commonly known as:  PLAVIX  
   
  
 spironolactone 25 mg tablet Commonly known as:  ALDACTONE  
   
  
 TOPROL XL 50 mg XL tablet Generic drug:  metoprolol succinate Where to Get Your Medications Information on where to get these meds will be given to you by the nurse or doctor. ! Ask your nurse or doctor about these medications  
  cefdinir 300 mg capsule Discharge Instructions HOSPITALIST DISCHARGE INSTRUCTIONS 
NAME: Jesi Macias :  3/2/1924 MRN:  [id number] Date/Time:  2017 7:27 AM 
 
ADMIT DATE: 2017 DISCHARGE DATE: 2017 ADMITTING DIAGNOSIS: 
Urinary tract infection Acute worsening of baseline creatinine Altered mental status Anemia/thrombocytopenia DISCHARGE DIAGNOSIS: 
As above MEDICATIONS: 
The following meds were held due to various reasons:  
1) Metoprolol => your blood pressure was too low to resume (your average blood pressure systolic \"top\" number was in the 100's  
2) Plavix => your platelets were too low to resume 3) Spironolactone => your kidney numbers were elevated and your blood pressure was low It is possible you may not need to be back on these medicines however, please follow-up in one week with your primary MD so he can help make this determination · It is important that you take the medication exactly as they are prescribed. · Keep your medication in the bottles provided by the pharmacist and keep a list of the medication names, dosages, and times to be taken in your wallet. · Do not take other medications without consulting your doctor. Pain Management: per above medications What to do at [facility] Recommended diet:  Cardiac Diet Recommended activity: Activity as tolerated If you experience any of the following symptoms then please call your primary care physician or return to the emergency room if you cannot get hold of your doctor: 
Fever, chills, nausea, vomiting, diarrhea, change in mentation, falling, bleeding, shortness of breath, chest pain Follow Up: 
Dr. Courtney Almaguer MD  you are to call and set up an appointment to see him early next week Follow-up Information Follow up With Details Comments Contact Info Courtney Almaguer MD   [address] [place] [address],[address] [address] [phone] Please follow-up with Dr. Negin Pimentel in 1-2 weeks. Please call to schedule an appointment ([phone]) Information obtained by : 
I understand that if any problems occur once I am at home I am to contact my physician. I understand and acknowledge receipt of the instructions indicated above.   
 
                                                                                                                                     
Physician's or R.N.'s Signature Date/Time Patient or Representative Signature                                                          Date/Time Discharge Orders None [facility] Announcement We are excited to announce that we are making your provider's discharge notes available to you in Job36. You will see these notes when they are completed and signed by the physician that discharged you from your recent hospital stay. If you have any questions or concerns about any information you see in Job36, please call the Health Information Department where you were seen or reach out to your Primary Care Provider for more information about your plan of care. Introducing [facility] HE[facility]! Dear Apollo Brown: Thank you for requesting a Job36 account. Our records indicate that you already have an active Job36 account. You can access your account anytime at [email or web address] Did you know that you can access your hospital and ER discharge instructions at any time in Job36? You can also review all of your test results from your hospital stay or ER visit. Additional Information If you have questions, please visit the Frequently Asked Questions section of the Job36 website at [email or web address] Remember, Job36 is NOT to be used for urgent needs. For medical emergencies, dial 911. Now available from your iPhone and Android! General Information Please provide this summary of care documentation to your next provider. Patient Signature:  ____________________________________________________________ Date:  ____________________________________________________________  
  
[place] Provider Signature:  ____________________________________________________________  Date: ____________________________________________________________

## 2017-05-29 NOTE — IP AVS SNAPSHOT
Summary of Care Report The Summary of Care report has been created to help improve care coordination. Users with access to [employer] or [address] (Web-based application) may access additional patient information including the Discharge Summary. If you are not currently a [address] user and need more information, please call the number listed below in the [place] [address] section and ask to be connected with Medical Records. Facility Information Name Address Phone [address] [facility] [place] [address] [phone] Patient Information Patient Name Sex  Fan Tate ([id number]) Female 3/2/1924 Discharge Information Admitting Provider Service Area Unit Siomara Parham MD / 25-10  [place] [facility]  [phone] Discharge Provider Discharge Date/Time Discharge Disposition Destination (none) 2017 Afternoon (Pending) AHR (none) Patient Language Language ENGLISH [13] Hospital Problems as of 2017  Reviewed: 2017  3:35 PM by Siomara Parham MD  
  
  
  
 Class Noted - Resolved Last Modified POA Active Problems CAD (coronary artery disease), native coronary artery  2011 - Present 2017 by Siomara Parham MD Yes Entered by Leslie Zuniga  
  AF (atrial fibrillation) ([facility] [address]  2011 - Present 2017 by Siomara Parham MD Yes Entered by Leslie Zuniga * (Principal)Symptomatic anemia  2017 - Present 2017 by Siomara Parham MD Unknown Entered by Siomara Parham MD  
  Weakness  2017 - Present 2017 by Siomara Parham MD Yes Entered by Siomara Parham MD  
  UTI (urinary tract infection)  2017 - Present 2017 by Siomara Parham MD Yes Entered by Siomara Parham MD  
  SHANA (acute kidney injury) ([facility] [address]  2017 - Present 2017 by Siomara Parham MD Yes   Entered by Vascular Imaging RENETTA Thompson MD  
  CKD (chronic kidney disease) stage 4, GFR 15-29 ml/min ([facility]) (Chronic)  5/29/2017 - Present 5/29/2017 by Mely Pace MD Yes Entered by Mely Pace MD  
  Diastolic CHF, chronic ([facility] [address] (Chronic)  5/29/2017 - Present 5/29/2017 by Mely Pace MD Yes Entered by Mely Pace MD  
  Thrombocytopenia ([facility] [address]  5/31/2017 - Present 5/31/2017 by Berhane Loya MD Unknown Entered by Berhane Loya MD  
  
Non-Hospital Problems as of 6/1/2017  Reviewed: 5/29/2017  3:35 PM by Mely Pace MD  
  
  
  
 Class Noted - Resolved Last Modified Active Problems Arrhythmia sinus bradycardia  2/4/2011 - Present 2/4/2011 by Caitlin Bravo Entered by Caitlin Bravo Acute pulmonary edema ([facility] [address]  3/4/2017 - Present 3/4/2017 by Liane Cade MD  
  Entered by Liane Cade MD  
  Troponin level elevated  3/4/2017 - Present 3/4/2017 by Liane Cade MD  
  Entered by Liane Cade MD  
  Anemia  3/4/2017 - Present 3/4/2017 by Liane Cade MD  
  Entered by Liane Cade MD  
  ARF (acute renal failure) ([facility] [address]  3/4/2017 - Present 3/4/2017 by Liane Cade MD  
  Entered by Liane Cade MD  
  Acute respiratory failure ([facility] [address]  3/4/2017 - Present 3/4/2017 by Liane Cade MD  
  Entered by Liane Cade MD  
  
You are allergic to the following No active allergies Current Discharge Medication List  
  
START taking these medications Dose & Instructions Dispensing Information Comments  
 cefdinir 300 mg capsule Commonly known as:  OMNICEF Dose:  300 mg Take 1 Cap by mouth two (2) times a day for 3 days. Quantity:  6 Cap Refills:  0 CONTINUE these medications which have NOT CHANGED Dose & Instructions Dispensing Information Comments Gideon Austin Generic drug:  ceramides 1,3,6-11  
 by Apply Externally route daily as needed (dry skin). Refills:  0  
   
 COLACE 100 mg capsule Generic drug: docusate sodium Dose:  200 mg Take 200 mg by mouth as needed. Refills:  0 DELZICOL 400 mg Cdti capsule Generic drug:  mesalamine Dose:  400 mg Take 400 mg by mouth three (3) times daily as needed (loose stools). Refills:  0  
   
 dilTIAZem  mg ER capsule Commonly known as:  CARDIZEM CD Dose:  120 mg Take 120 mg by mouth daily. Refills:  0  
   
 * doxepin 25 mg capsule Commonly known as:  SINEquan Dose:  25 mg Take 25 mg by mouth nightly. Refills:  0  
   
 * doxepin 25 mg capsule Commonly known as:  SINEquan Dose:  50 mg Take 50 mg by mouth nightly as needed. Refills:  0  
   
 furosemide 40 mg tablet Commonly known as:  LASIX Dose:  40 mg Take 40 mg by mouth daily. Refills:  0  
   
 methenamine hippurate 1 gram tablet Commonly known as:  HIPREX Dose:  1 g Take 1 g by mouth two (2) times daily (with meals). Refills:  0 NexIUM 40 mg capsule Generic drug:  esomeprazole Dose:  40 mg Take 40 mg by mouth Daily (before breakfast). Refills:  0  
   
 simvastatin 10 mg tablet Commonly known as:  ZOCOR Dose:  10 mg Take 10 mg by mouth nightly. Refills:  0  
   
 zolpidem 5 mg tablet Commonly known as:  AMBIEN Dose:  5 mg Take 5 mg by mouth nightly. Refills:  0  
   
 * Notice: This list has 2 medication(s) that are the same as other medications prescribed for you. Read the directions carefully, and ask your doctor or other care provider to review them with you. STOP taking these medications Comments  
 clopidogrel 75 mg Tab Commonly known as:  PLAVIX  
   
   
 spironolactone 25 mg tablet Commonly known as:  ALDACTONE  
   
   
 TOPROL XL 50 mg XL tablet Generic drug:  metoprolol succinate Follow-up Information Follow up With Details Comments Contact Info Eduardo Kline MD   [address] [facility] [place] [address] [address] [phone] Discharge Instructions HOSPITALIST DISCHARGE INSTRUCTIONS 
NAME: Suzie Child :  3/2/1924 MRN:  [id number] Date/Time:  2017 7:27 AM 
 
ADMIT DATE: 2017 DISCHARGE DATE: 2017 ADMITTING DIAGNOSIS: 
Urinary tract infection Acute worsening of baseline creatinine Altered mental status Anemia/thrombocytopenia DISCHARGE DIAGNOSIS: 
As above MEDICATIONS: 
The following meds were held due to various reasons:  
1) Metoprolol => your blood pressure was too low to resume (your average blood pressure systolic \"top\" number was in the 100's  
2) Plavix => your platelets were too low to resume 3) Spironolactone => your kidney numbers were elevated and your blood pressure was low It is possible you may not need to be back on these medicines however, please follow-up in one week with your primary MD so he can help make this determination · It is important that you take the medication exactly as they are prescribed. · Keep your medication in the bottles provided by the pharmacist and keep a list of the medication names, dosages, and times to be taken in your wallet. · Do not take other medications without consulting your doctor. Pain Management: per above medications What to do at [facility] Recommended diet:  Cardiac Diet Recommended activity: Activity as tolerated If you experience any of the following symptoms then please call your primary care physician or return to the emergency room if you cannot get hold of your doctor: 
Fever, chills, nausea, vomiting, diarrhea, change in mentation, falling, bleeding, shortness of breath, chest pain Follow Up: 
Dr. Lalitha Castillo MD  you are to call and set up an appointment to see him early next week Follow-up Information Follow up With Details Comments Contact Info Lalitha Castillo MD    [facility],[address] [place] [address] [address] [phone] Please follow-up with Dr. Mikal Ch in 1-2 weeks.  Please call to schedule an appointment ([phone]) Information obtained by : 
I understand that if any problems occur once I am at home I am to contact my physician. I understand and acknowledge receipt of the instructions indicated above. Physician's or R.N.'s Signature                                                                  Date/Time Patient or Representative Signature                                                          Date/Time Chart Review Routing History Recipient Method Report Sent By Hiram Lyman MD  
Fax: [phone] Phone: [phone] Fax Cyndi Ramos MD NOTES [employer]OUT[employer] REPORT Arcelia Fry MD [[id number]] 3/7/2017 11:28 AM 03/07/2017 Donald Lyman MD  
Fax: [phone] Phone: [phone] Fax Cyndi Ramos MD NOTES [employer] ROUTING REPORT Arcelia Fry MD [[id number]] 3/7/2017 11:29 AM 03/07/2017 Donald Lyman MD  
Fax: [phone] Phone: [phone] Fax [facility] MAYA SIMS NOTES AUTO ROUTING REPORT Daniel Palmer MD [[id number]] 5/29/2017  7:09 PM 05/29/2017 Donald Lyman MD  
Fax: [phone] Phone: [phone] Fax Cyndi Ramos MD NOTES AUT[employer] ROUTING REPORT Arcelia Fry MD [[id number]] 6/1/2017 10:48 AM 06/01/2017

## 2017-05-29 NOTE — ED TRIAGE NOTES
Pt assisted to treatment area via wheelchair her son states that her Mom for the past week has been treated for a UTI by [facility], he took her there last Monday when she began to complain of pain with urination.   Her son states that as the week has continued she has become more weak and fatigued and he is concerned for her anemia or the UTI increasing

## 2017-05-29 NOTE — ED PROVIDER NOTES
Patient is a 80 y.o. female presenting with fatigue and urinary pain. The history is provided by the patient and a relative. Fatigue   This is a recurrent problem. Episode onset: for about the last week. The problem has been gradually worsening. There was no focality noted. Pertinent negatives include no focal weakness, no slurred speech, no mental status change, no unresponsiveness and no disorientation. There has been no fever. Pertinent negatives include no shortness of breath, no chest pain, no vomiting, no altered mental status, no confusion and no nausea. Urinary Pain    This is a new problem. The current episode started more than 1 week ago. The problem occurs every urination. The problem has not changed since onset. There has been no fever. Associated symptoms include frequency. Pertinent negatives include no chills, no nausea, no vomiting, no flank pain, no abdominal pain and no back pain. The patient is not pregnant. Past Medical History:   Diagnosis Date    A-fib ([facility] [address]     Acid reflux     Anemia     CAD (coronary artery disease)     Heart failure (HCC)     High cholesterol     Hypertension     UTI (urinary tract infection)        Past Surgical History:   Procedure Laterality Date    HX APPENDECTOMY      HX HYSTERECTOMY      HX PACEMAKER      HX PACEMAKER PLACEMENT      HX TONSILLECTOMY           History reviewed. No pertinent family history. Social History     Social History    Marital status:      Spouse name: N/A    Number of children: N/A    Years of education: N/A     Occupational History    Not on file. Social History Main Topics    Smoking status: Never Smoker    Smokeless tobacco: Never Used    Alcohol use No    Drug use: No    Sexual activity: Not on file     Other Topics Concern    Not on file     Social History Narrative         ALLERGIES: Review of patient's allergies indicates no known allergies.     Review of Systems   Constitutional: Positive for fatigue. Negative for chills and fever. Respiratory: Negative for chest tightness and shortness of breath. Cardiovascular: Negative for chest pain. Gastrointestinal: Negative for abdominal pain, nausea and vomiting. Genitourinary: Positive for dysuria and frequency. Negative for flank pain. Musculoskeletal: Negative for back pain. Neurological: Positive for weakness. Negative for focal weakness. Psychiatric/Behavioral: Negative for confusion. All other systems reviewed and are negative. Vitals:    05/29/17 1344   BP: 99/49   Pulse: 60   Resp: 20   SpO2: 96%            Physical Exam   Constitutional: She appears well-developed and well-nourished. No distress. HENT:   Head: Normocephalic and atraumatic. Eyes: Conjunctivae and EOM are normal.   Neck: Normal range of motion. Muscular tenderness present. No spinous process tenderness present. Cardiovascular: Normal rate, regular rhythm, normal heart sounds and intact distal pulses. No murmur heard. Pulmonary/Chest: Effort normal and breath sounds normal. No stridor. No respiratory distress. Abdominal: Soft. Bowel sounds are normal. There is tenderness in the suprapubic area. There is no rebound. Genitourinary:   Genitourinary Comments: No stool in vault on my exam   Musculoskeletal: Normal range of motion. She exhibits no edema or tenderness. Neurological: She is alert. No cranial nerve deficit. Skin: Skin is warm and dry. She is not diaphoretic. Psychiatric: She has a normal mood and affect. Nursing note and vitals reviewed. MDM  Number of Diagnoses or Management Options  Diagnosis management comments: 1) increased weakness and fatigue, check labs and urine   2) right shoulder pain - x-ray for occult trauma.     1415 - Urine shows infection still, will give IV ceftriaxone and re-eval    1530 - patient dehydrated, anemic, needs admit for blood and IV ABX as failing outpatient therapy       Amount and/or Complexity of Data Reviewed  Clinical lab tests: ordered and reviewed  Tests in the radiology section of CPT®: reviewed and ordered  Decide to obtain previous medical records or to obtain history from someone other than the patient: yes (Urine culture from 5/22 at [facility] - Citrobacter sensitive to tetracycline)  Discuss the patient with other providers: yes  Independent visualization of images, tracings, or specimens: yes    Patient Progress  Patient progress: stable    ED Course       Procedures

## 2017-05-29 NOTE — H&P
[facility] [place] [address]  [address] [facility], [address] [place] [address]  [phone]    Admission History and Physical      NAME:  Mercedes Wei   :   3/2/1924   MRN:  [id number]     PCP:  Marcela Fothergill, MD     Date/Time:  2017         Subjective:     CHIEF COMPLAINT: weakness     HISTORY OF PRESENT ILLNESS:     The patient is a 81 yo hx of HTN, Pafib, CAD, dCHF, CKD 4, presented w/ weakness, UTI, SHANA, symptomatic anemia. The patient is a poor historian. Her son stated that she was diagnosed with a UTI by [facility] last week and was given oral Doxy. However, the patient still c/o weakness, dysuria. Denied chest pain, SOB, fevers, chills, nausea, vomiting, diarrhea. The patient also has chronic anemia of unknown etiology for several years. In the ED, WBC was 7.3, U/A significant for a UTI, Hgb 6.8 (baseline around 8). No Known Allergies    Prior to Admission medications    Medication Sig Start Date End Date Taking? Authorizing Provider   zolpidem (AMBIEN) 5 mg tablet Take 5 mg by mouth nightly as needed for Sleep. Yes Debra Martinez MD   spironolactone (ALDACTONE) 25 mg tablet Take 25 mg by mouth daily. Yes Debra Martinez MD   doxycycline (ADOXA) 100 mg tablet Take 100 mg by mouth two (2) times a day. Yes Debra Martinez MD   dilTIAZem CD (CARDIZEM CD) 120 mg ER capsule Take 120 mg by mouth daily. Yes Historical Provider   ferrous sulfate 325 mg (65 mg iron) tablet Take 1 Tab by mouth Daily (before breakfast). 3/6/17  Yes Cuco Egan MD   furosemide (LASIX) 40 mg tablet Take 40 mg by mouth daily. Yes Debra Martinez MD   clopidogrel (PLAVIX) 75 mg tab Take 75 mg by mouth daily. Yes Debra Martinez MD   metoprolol succinate (TOPROL XL) 50 mg XL tablet Take 50 mg by mouth daily. Yes Debra Martinez MD   esomeprazole (NEXIUM) 40 mg capsule Take 40 mg by mouth Daily (before breakfast). Yes Debra Martinez MD   doxepin (SINEQUAN) 25 mg capsule Take 25 mg by mouth nightly.    Yes Debra Martinez MD   simvastatin (ZOCOR) 10 mg tablet Take 10 mg by mouth nightly. Yes Debra Martinez MD   solifenacin (VESICARE) 5 mg tablet Take 5 mg by mouth nightly. Yes Debra Martinez MD   mesalamine (DELZICOL) 400 mg cdti capsule Take 400 mg by mouth three (3) times daily as needed (Constipation). Debra Martinez MD   ceramides 1,3,6-11 (CERAVE) lotn by Apply Externally route daily as needed (dry skin). Debra Martinez MD   methenamine hippurate (HIPREX) 1 gram tablet Take 1 g by mouth two (2) times daily (with meals). Debra Martinez MD   docusate sodium (COLACE) 100 mg capsule Take 200 mg by mouth nightly.     Historical Provider       Past Medical History:   Diagnosis Date    A-fib ([facility] [address]     Acid reflux     Anemia     CAD (coronary artery disease)     Heart failure (HCC)     High cholesterol     Hypertension     UTI (urinary tract infection)         Past Surgical History:   Procedure Laterality Date    HX APPENDECTOMY      HX HYSTERECTOMY      HX PACEMAKER      HX PACEMAKER PLACEMENT      HX TONSILLECTOMY         Social History   Substance Use Topics    Smoking status: Never Smoker    Smokeless tobacco: Never Used    Alcohol use No        Family History   Problem Relation Age of Onset    Family history unknown: Yes        Review of Systems:  (bold if positive, if negative)    Gen:  Eyes:  ENT:  CVS:  Pulm:  GI:    :    MS:   weakness,Skin:  Psych:  Endo:    Hem:  Renal:    Neuro:          Objective:      VITALS:    Vital signs reviewed; most recent are:    Visit Vitals    BP 96/57 (BP 1 Location: Right arm, BP Patient Position: At rest)    Pulse (!) 59    Temp 96.9 °F (36.1 °C)    Resp 18    SpO2 100%    Breastfeeding Unknown     SpO2 Readings from Last 6 Encounters:   05/29/17 100%   03/06/17 100%    O2 Flow Rate (L/min): 3 l/min   No intake or output data in the 24 hours ending 05/29/17 8543     Exam:     Physical Exam:    Gen:  Elderly, thin, frail, NAD  HEENT:  Pink conjunctivae, PERRL, hearing intact to voice, dry mucous membranes  Neck:  Supple, without masses, thyroid non-tender  Resp:  No accessory muscle use, clear breath sounds without wheezes rales or rhonchi  Card:  No murmurs, normal S1, S2 without thrills, bruits or peripheral edema  Abd:  Soft, non-tender, non-distended, normoactive bowel sounds are present  Lymph:  No cervical adenopathy  Musc:  No cyanosis or clubbing  Skin:  Some bruising on extremities  Neuro:  Cranial nerves 3-12 are grossly intact, follows commands appropriately  Psych:  Alert with poor insight. Oriented to person, place    Labs:    Recent Labs      05/29/17   1414   WBC  7.3   HGB  6.8*   HCT  20.4*   PLT  109*     Recent Labs      05/29/17   1414   NA  128*   K  4.9   CL  95*   CO2  24   GLU  134*   BUN  75*   CREA  2.24*   CA  9.2     Lab Results   Component Value Date/Time    Glucose (POC) 231 11/15/2010 04:30 PM    Glucose (POC) 113 11/12/2010 12:46 AM     No results for input(s): PH, PCO2, PO2, HCO3, FIO2 in the last 72 hours. Recent Labs      05/29/17   1343   INR  1.2*       Radiology and EKG reviewed:   pending    **Old Records reviewed in [facility]**       Assessment/Plan:       Principal Problem:    81 yo hx of HTN, Pafib, CAD, dCHF, CKD 4, presented w/ weakness, UTI, SHANA, symptomatic anemia    1) Weakness: likely a combination of dehydration, UTI, anemia. Check CK. Will start IVF, blood transfusion. Consult PT/OT    2) UTI: will send urine Cx. Empirically start on IV CTX    3) Symptomatic anemia: Hgb of 6.8 on admission, baseline around 8. Has had negative w/u in the past.  Denied bleeding. Will recheck iron, ferritin, b12, folate, Retic, LDH, hapto. Transfuse 1u RBCs    4) SHANA/CKD 4: likely pre-renal.  Hold lasix, aldactone. Start IVF, monitor BMP    5) CAD/chronic diastolic CHF: volume stable. No chest pain. Cont BB, plavix, statin. Hold aldactone, lasix due to SHANA and hypotension    6) Pafib: cont dilt, BB. Not on blood thinners. Consult Pharm for med rec    Code: DNR - discussed with patient, son    Risk of deterioration: high      Total time spent with patient: 79 895 [address] discussed with: Patient, family, nursing    Discussed:  Care Plan    Prophylaxis:  SCD's    Probable Disposition:  [facility] PT, OT, RN           ___________________________________________________    Attending Physician: Robyn Sanders MD

## 2017-05-29 NOTE — PROGRESS NOTES
Bedside and Verbal shift change report given to  Demond Christie Rn (oncoming nurse) by   Sinan Nathan (offgoing nurse). Report included the following information SBAR, Kardex, Intake/Output, MAR, Accordion, Recent Results and Med Rec Status.

## 2017-05-29 NOTE — IP AVS SNAPSHOT
Current Discharge Medication List  
  
START taking these medications Dose & Instructions Dispensing Information Comments Morning Noon Evening Bedtime  
 cefdinir 300 mg capsule Commonly known as:  OMNICEF Your last dose was: Your next dose is:    
   
   
 Dose:  300 mg Take 1 Cap by mouth two (2) times a day for 3 days. Quantity:  6 Cap Refills:  0 CONTINUE these medications which have NOT CHANGED Dose & Instructions Dispensing Information Comments Morning Noon Evening Bedtime Johnjamari Joseph Generic drug:  ceramides 1,3,6-11 Your last dose was: Your next dose is:    
   
   
 by Apply Externally route daily as needed (dry skin). Refills:  0  
     
   
   
   
  
 COLACE 100 mg capsule Generic drug:  docusate sodium Your last dose was: Your next dose is:    
   
   
 Dose:  200 mg Take 200 mg by mouth as needed. Refills:  0 DELZICOL 400 mg Cdti capsule Generic drug:  mesalamine Your last dose was: Your next dose is:    
   
   
 Dose:  400 mg Take 400 mg by mouth three (3) times daily as needed (loose stools). Refills:  0  
     
   
   
   
  
 dilTIAZem  mg ER capsule Commonly known as:  CARDIZEM CD Your last dose was: Your next dose is:    
   
   
 Dose:  120 mg Take 120 mg by mouth daily. Refills:  0  
     
   
   
   
  
 * doxepin 25 mg capsule Commonly known as:  SINEquan Your last dose was: Your next dose is:    
   
   
 Dose:  25 mg Take 25 mg by mouth nightly. Refills:  0  
     
   
   
   
  
 * doxepin 25 mg capsule Commonly known as:  SINEquan Your last dose was: Your next dose is:    
   
   
 Dose:  50 mg Take 50 mg by mouth nightly as needed. Refills:  0  
     
   
   
   
  
 furosemide 40 mg tablet Commonly known as:  LASIX Your last dose was: Your next dose is:    
   
   
 Dose:  40 mg Take 40 mg by mouth daily. Refills:  0  
     
   
   
   
  
 methenamine hippurate 1 gram tablet Commonly known as:  HIPREX Your last dose was: Your next dose is:    
   
   
 Dose:  1 g Take 1 g by mouth two (2) times daily (with meals). Refills:  0 NexIUM 40 mg capsule Generic drug:  esomeprazole Your last dose was: Your next dose is:    
   
   
 Dose:  40 mg Take 40 mg by mouth Daily (before breakfast). Refills:  0  
     
   
   
   
  
 simvastatin 10 mg tablet Commonly known as:  ZOCOR Your last dose was: Your next dose is:    
   
   
 Dose:  10 mg Take 10 mg by mouth nightly. Refills:  0  
     
   
   
   
  
 zolpidem 5 mg tablet Commonly known as:  AMBIEN Your last dose was: Your next dose is:    
   
   
 Dose:  5 mg Take 5 mg by mouth nightly. Refills:  0  
     
   
   
   
  
 * Notice: This list has 2 medication(s) that are the same as other medications prescribed for you. Read the directions carefully, and ask your doctor or other care provider to review them with you. STOP taking these medications   
 clopidogrel 75 mg Tab Commonly known as:  PLAVIX  
   
  
 spironolactone 25 mg tablet Commonly known as:  ALDACTONE  
   
  
 TOPROL XL 50 mg XL tablet Generic drug:  metoprolol succinate Where to Get Your Medications Information on where to get these meds will be given to you by the nurse or doctor. ! Ask your nurse or doctor about these medications  
  cefdinir 300 mg capsule

## 2017-05-29 NOTE — PROGRESS NOTES
TRANSFER - IN REPORT:    Verbal report received from  [facility] [employer], Rn(name) on Michelle Fisher  being received from  [facility](unit) for routine progression of care      Report consisted of patients Situation, Background, Assessment and   Recommendations(SBAR). Information from the following report(s) SBAR, Intake/Output, MAR, Accordion, Recent Results and Med Rec Status was reviewed with the receiving nurse. Opportunity for questions and clarification was provided. Assessment completed upon patients arrival to unit and care assumed.

## 2017-05-29 NOTE — PROGRESS NOTES
Primary Nurse Sherin Mathew RN and  Tyrese Lobato RN performed a dual skin assessment on this patient No impairment noted except generalize bruises.   Tolu score is 17

## 2017-05-29 NOTE — ED NOTES
TRANSFER - OUT REPORT:    Verbal report given to Tucker Agee RN (name) on Angel Luis Hamm  being transferred to [facility] (unit) for routine progression of care       Report consisted of patients Situation, Background, Assessment and   Recommendations(SBAR). Information from the following report(s) SBAR was reviewed with the receiving nurse. Lines:   Peripheral IV 05/29/17 Right Antecubital (Active)   Site Assessment Clean, dry, & intact 5/29/2017  2:22 PM   Phlebitis Assessment 0 5/29/2017  2:22 PM   Infiltration Assessment 0 5/29/2017  2:22 PM   Dressing Status Clean, dry, & intact 5/29/2017  2:22 PM   Dressing Type Transparent 5/29/2017  2:22 PM        Opportunity for questions and clarification was provided.       Patient transported with:   Monitor

## 2017-05-30 NOTE — PROGRESS NOTES
CM Note:  Met with pt's son for d/c planning. PTA pt was independent with ADL's and used a RW to assist with ambulation. Additional DME at home: w/c, shower chair and BSC. She is open to [facility] for home health. She was in rehab at [facility] and [facility]. Her emergency contact is her son, Melvi Elliott ([phone]), who will drive her home at d/c. Pt has Rx coverage and gets her medications from [facility]. Will continue to follow and assist with any needs prior to d/c. MADI Castro RN    Care Management Interventions  PCP Verified by CM:  Yes (PCP is Dr. Cheyenne Lubin.)  Transition of Care Consult (CM Consult): Discharge Planning  MyChart Signup: No  Discharge Durable Medical Equipment: No  Physical Therapy Consult: Yes  Occupational Therapy Consult: Yes  Speech Therapy Consult: No  Current Support Network: [place] (Pt lives with her son and his wife in a one story house with an entry ramp.)  Confirm Follow Up Transport: Family (Pt's son to transport her home at d/c.)  Plan discussed with Pt/Family/Caregiver: Yes  Discharge Location  Discharge Placement: Home with one level

## 2017-05-30 NOTE — PROGRESS NOTES
Nutrition Assessment:    RECOMMENDATIONS/INTERVENTION(S):   Continue Regular liberalized diet to promote PO  Start ONS trial:  · Mighty Shake BID  · Ensure Pudding once daily    SLP eval if pt w/ swallowing difficulty     ASSESSMENT:   5/30:  Consult received. 80 yof admitted for symptomatic anemia. Pmhx includes CKD stage IV, CAD, dCHF, Afib. Brennen Leonard per advanced age. No recent wt loss but consider 2+ pitting edema BLE. Observed clavicular, BUE muscle wasting. Pt reports normal diet is 2-3 meals per day. Received speech services for strengthening PTA. Swallowing ok now, has to chew slowly. Currently having difficulty feeding self 2/2 right shoulder pain. Nursing assisting. Pt agreeable to ONS trial while PO reduced d/t feeding difficulty. Dislikes Ensure drink. Labs/meds reviewed. Elevated BUN, Cr.  K & Phos WDL. Wound to sacrum & 2+ pitting BLE edema. SUBJECTIVE/OBJECTIVE:     Diet Order: Regular  % Eaten:  No data found. Pertinent Medications: [x] Reviewed - fe sulf, protonix, statin, pericolace, zofran    Chemistries:  Lab Results   Component Value Date/Time    Sodium 130 05/30/2017 05:43 AM    Potassium 4.3 05/30/2017 05:43 AM    Chloride 96 05/30/2017 05:43 AM    CO2 23 05/30/2017 05:43 AM    Anion gap 11 05/30/2017 05:43 AM    Glucose 93 05/30/2017 05:43 AM    BUN 77 05/30/2017 05:43 AM    Creatinine 2.13 05/30/2017 05:43 AM    BUN/Creatinine ratio 36 05/30/2017 05:43 AM    GFR est AA 26 05/30/2017 05:43 AM    GFR est non-AA 22 05/30/2017 05:43 AM    Calcium 8.6 05/30/2017 05:43 AM    AST (SGOT) 69 05/30/2017 05:43 AM    Alk.  phosphatase 98 05/30/2017 05:43 AM    Protein, total 6.1 05/30/2017 05:43 AM    Albumin 2.9 05/30/2017 05:43 AM    Globulin 3.2 05/30/2017 05:43 AM    A-G Ratio 0.9 05/30/2017 05:43 AM    ALT (SGPT) 58 05/30/2017 05:43 AM      Anthropometrics: Height: 4' 11\" (149.9 cm) Weight: 47.6 kg (104 lb 15 oz)    IBW (%IBW): 43.1 kg (95 lb) (110.46 %) UBW (%UBW):   (  %) BMI: Body mass index is 21.2 kg/(m^2). This BMI is indicative of:   [x] Underweight - per age (pitting edema BLE)   [] Normal    [] Overweight    []  Obesity    []  Extreme Obesity (BMI>40)  Estimated Nutrition Needs (Based on): 4568 Kcals/day (-1521 (RMR (786) x 1.3 AF + 250-500)) , 61 g (1.3 g/kg x actual body wt) Protein  Carbohydrate: At Least 130 g/day  Fluids: 1300 mL/day    Last BM: 5/29, abd WDL   []Active     []Hyperactive  []Hypoactive       [] Absent   BS -> not noted  Skin:    [] Intact   [] Incision  [x] Breakdown  - wound to sacrum  [] DTI   [] Tears/Excoriation/Abrasion  [x]Edema - 2+ pitting edema BLE [] Other:      Wt Readings from Last 30 Encounters:   05/30/17 47.6 kg (104 lb 15 oz)   03/06/17 45.9 kg (101 lb 1.6 oz)      NUTRITION DIAGNOSES:   Problem:  Inadequate oral food/beverage intake     Etiology: related to limited right arm mobility     Signs/Symptoms: as evidenced by reports of decreased PO intake 2/2 difficulty feeding self      NUTRITION INTERVENTIONS:  Meals/Snacks: General/healthful diet   Supplements: Commercial supplement              GOAL:   Increased PO intake of meals & tolerate ONS in the next 1-3 days    Cultural, Confucianist, or Ethnic Dietary Needs: None     LEARNING NEEDS (Diet, Food/Nutrient-Drug Interaction):    [x] None Identified   [] Identified and Education Provided/Documented   [] Identified and Pt declined/was not appropriate      [] Interdisciplinary Care Plan Reviewed/Documented    [] Discharge Needs:    TBD   [] No Nutrition Related Discharge Needs    NUTRITION RISK:   Pt Is At Nutrition Risk  [x]     No Nutrition Risk Identified  []       PT SEEN FOR:    [x]  MD Consult: []Calorie Count      []Diabetic Diet Education        []Diet Education     []Electrolyte Management     [x]General Nutrition Management and Supplements     []Management of Tube Feeding     []TPN Recommendations    []  RN Referral:  []MST score >=2     []Enteral/Parenteral Nutrition PTA     []Pregnant: Gestational DM or Multigestation                 [] Pressure Ulcer    []  Low BMI      []  Length of Stay       [] Dysphagia Diet         [] Ventilator  []  Follow-up     Previous Recommendations:   [] Implemented          [] Not Implemented          [x] Not Applicable    Previous Goal:   [] Met              [] Progressing Towards Goal              [] Not Progressing Towards Goal   [x] Not Applicable            Avery Boyd, [address] [place] [address]   Pager [phone]

## 2017-05-30 NOTE — PROGRESS NOTES
Problem: Self Care Deficits Care Plan (Adult)  Goal: *Acute Goals and Plan of Care (Insert Text)  Occupational Therapy Goals  Initiated 5/30/2017  1. Patient will perform grooming and upper body dressing seated EOB with supervision/set-up within 7 day(s). 2. Patient will perform lower body dressing with minimal assistance/contact guard assist within 7 day(s). 3. Patient will perform toilet transfers with moderate assistance using RW and BSC as needed within 7 day(s). 4. Patient will perform all aspects of toileting with moderate assistance within 7 day(s). 5. Patient will participate in upper extremity therapeutic exercise/activities with supervision/set-up for 10 minutes within 7 day(s). 6. Patient will utilize energy conservation techniques during functional activities with verbal and visual cues within 7 day(s). OCCUPATIONAL THERAPY EVALUATION  Patient: Manuel Rabago (58 y.o. female)  Date: 5/30/2017  Primary Diagnosis: Symptomatic anemia        Precautions:  Fall, DNR      ASSESSMENT :  Based on the objective data described below, the patient presents with drowsiness, generalized weakness, decreased endurance, mobility, balance and safety after admission for symptomatic anemia. Per pt and chart pt was dx with UTI 1 week ago at [facility] and has been declining functionally since. Pt currently requires set-up for self feeding, max A for all other UE ADLs, total A for LE ADLs and toileting and max A for functional mobility EOB with verbal cues to maintain attention to task due to drowsiness. Per pt up until a week ago she was able to perform her own ADLs without physical assist and amb within the house with her RW. She has an aide M-Sat from 8a-3p that assists both her and her daughter-in-law who has multiple sclerosis and then her son assists both of them all other times. Recommend HH vs SNF depending on available assist at home and progress while hospitalized.      Patient will benefit from skilled intervention to address the above impairments. Patients rehabilitation potential is considered to be Guarded  Factors which may influence rehabilitation potential include:   [ ]             None noted  [ ]             Mental ability/status  [X]             Medical condition  [ ]             Home/family situation and support systems  [ ]             Safety awareness  [ ]             Pain tolerance/management  [ ]             Other:        PLAN :  Recommendations and Planned Interventions:  [X]               Self Care Training                  [X]        Therapeutic Activities  [X]               Functional Mobility Training    [ ]        Cognitive Retraining  [X]               Therapeutic Exercises           [X]        Endurance Activities  [X]               Balance Training                   [ ]        Neuromuscular Re-Education  [ ]               Visual/Perceptual Training     [X]   Home Safety Training  [X]               Patient Education                 [X]        Family Training/Education  [ ]               Other (comment):     Frequency/Duration: Patient will be followed by occupational therapy 3 times a week to address goals. Discharge Recommendations: Home Health vs [facility]  Further Equipment Recommendations for Discharge: TBD       SUBJECTIVE:   Patient stated I am so tired.       OBJECTIVE DATA SUMMARY:   HISTORY:   Past Medical History:   Diagnosis Date    A-fib ([facility]      Acid reflux      Anemia      CAD (coronary artery disease)      Heart failure (HCC)      High cholesterol      Hypertension      UTI (urinary tract infection)       Past Surgical History:   Procedure Laterality Date    HX APPENDECTOMY        HX HYSTERECTOMY        HX PACEMAKER        HX PACEMAKER PLACEMENT        HX TONSILLECTOMY            Prior Level of Function/Home Situation:  Per pt up until a week ago she was able to perform her own ADLs without physical assist and amb within the house with her RW. She has an aide M-Sat from 8a-3p that assists both her and her daughter-in-law who has multiple sclerosis and then her son assists both of them all other times. Expanded or extensive additional review of patient history: obtained from chart and pt  Home Situation  Home Environment: Private residence  # Steps to Enter: 0  Wheelchair Ramp: Yes  One/Two Story Residence: One story  Living Alone: No  Support Systems: Child(mikael)  Patient Expects to be Discharged to[de-identified] Private residence  Current DME Used/Available at Home: Walker, rolling, Wheelchair, [address] Connection chair  Tub or Shower Type: Shower  [X]  Right hand dominant             [ ]  Left hand dominant     EXAMINATION OF PERFORMANCE DEFICITS:  Cognitive/Behavioral Status:  Neurologic State: Drowsy  Orientation Level: Oriented X4  Cognition: Appropriate for age attention/concentration; Follows commands  Perception: Appears intact  Perseveration: No perseveration noted  Safety/Judgement: Awareness of environment; Fall prevention; Insight into deficits     Hearing: Auditory  Auditory Impairment: Hard of hearing, bilateral  Hearing Aids/Status: Bilateral     Vision/Perceptual:    Acuity: Able to read clock/calendar on wall without difficulty    Corrective Lenses: Glasses     Range of Motion:  AROM: Generally decreased, functional  PROM: Generally decreased, functional                       Strength:  Strength: Generally decreased, functional                 Coordination:  Coordination: Generally decreased, functional  Fine Motor Skills-Upper: Left Impaired;Right Impaired    Gross Motor Skills-Upper: Left Intact; Right Intact     Tone & Sensation:  Tone: Normal                          Balance:  Sitting: Impaired (due to drowsiness)  Sitting - Static: Fair (occasional)  Sitting - Dynamic: Fair (occasional)  Standing: Impaired; With support     Functional Mobility and Transfers for ADLs:  Bed Mobility:  Rolling: Moderate assistance; Additional time;Assist x1  Supine to Sit: Maximum assistance; Additional time;Assist x1  Sit to Supine: Maximum assistance; Additional time;Assist x1     Transfers:  Sit to Stand:  (not attempted with only 1 person)  Toilet Transfer :  (not attempted with only 1 person)     ADL Assessment:  Feeding: Setup; Additional time (seated supported in bed)     Oral Facial Hygiene/Grooming: Setup; Additional time (cues to [facility] attention due to drowsiness)     Bathing: Maximum assistance; Additional time;Assist x1 (bed level due to drowsiness and weakness)     Upper Body Dressing: Maximum assistance; Additional time;Assist x1 (bed level due to drowsiness and weakness)     Lower Body Dressing: Total assistance     Toileting: Total assistance     Cognitive Retraining  Safety/Judgement: Awareness of environment; Fall prevention; Insight into deficits     Functional Measure:  Barthel Index:      Bathin  Bladder: 5  Bowels: 5  Groomin  Dressin  Feedin  Mobility: 0  Stairs: 0  Toilet Use: 5  Transfer (Bed to Chair and Back): 5  Total: 25         Barthel and G-code impairment scale:  Percentage of impairment CH  0% CI  1-19% CJ  20-39% CK  40-59% CL  60-79% CM  80-99% CN  100%   Barthel Score 0-100 100 99-80 79-60 59-40 20-39 1-19    0   Barthel Score 0-20 20 17-19 13-16 9-12 5-8 1-4 0      The Barthel ADL Index: Guidelines  1. The index should be used as a record of what a patient does, not as a record of what a patient could do. 2. The main aim is to establish degree of independence from any help, physical or verbal, however minor and for whatever reason. 3. The need for supervision renders the patient not independent. 4. A patient's performance should be established using the best available evidence. Asking the patient, friends/relatives and nurses are the usual sources, but direct observation and common sense are also important. However direct testing is not needed.   5. Usually the patient's performance over the preceding 24-48 hours is important, but occasionally longer periods will be relevant. 6. Middle categories imply that the patient supplies over 50 per cent of the effort. 7. Use of aids to be independent is allowed. Rhiannon Silva., Barthel, ANDREW. (6793). Functional evaluation: the Barthel Index. [facility] (14)2. CASIE Jha, Priya Beltrán., Corona Smallwood., [place], [address] [place] [address] (1999). Measuring the change indisability after inpatient rehabilitation; comparison of the responsiveness of the Barthel Index and Functional Crawford Measure. Journal of Neurology, Neurosurgery, and Psychiatry, 66(4), 696-974. Severiano Hutchinson, VANDANA.J.A, KIMMIE Gonzales, & Danika Purvis M.A. (2004.) Assessment of post-stroke quality of life in cost-effectiveness studies: The usefulness of the Barthel Index and the EuroQoL-5D. Quality of Life Research, 13, 238-63         G codes: In compliance with CMSs Claims Based Outcome Reporting, the following G-code set was chosen for this patient based on their primary functional limitation being treated: The outcome measure chosen to determine the severity of the functional limitation was the Barthel Index with a score of 25/100 which was correlated with the impairment scale.       · Self Care:               - CURRENT STATUS:    CL - 60%-79% impaired, limited or restricted               - GOAL STATUS:           CK - 40%-59% impaired, limited or restricted               - D/C STATUS:                       ---------------To be determined---------------      Occupational Therapy Evaluation Charge Determination   History Examination Decision-Making   LOW Complexity : Brief history review  MEDIUM Complexity : 3-5 performance deficits relating to physical, cognitive , or psychosocial skils that result in activity limitations and / or participation restrictions LOW Complexity : No comorbidities that affect functional and no verbal or physical assistance needed to complete eval tasks       Based on the above components, the patient evaluation is determined to be of the following complexity level: LOW   Pain:  Pain Scale 1: Numeric (0 - 10)  Pain Intensity 1: 0              Activity Tolerance:   Fair-poor  Please refer to the flowsheet for vital signs taken during this treatment. After treatment:   [ ] Patient left in no apparent distress sitting up in chair  [X] Patient left in no apparent distress in bed  [X] Call bell left within reach  [X] Nursing notified  [ ] Caregiver present  [ ] Bed alarm activated      COMMUNICATION/EDUCATION:   The patients plan of care was discussed with: Registered Nurse.  [X] Home safety education was provided and the patient/caregiver indicated understanding. [X] Patient/family have participated as able in goal setting and plan of care. [X] Patient/family agree to work toward stated goals and plan of care. [ ] Patient understands intent and goals of therapy, but is neutral about his/her participation. [ ] Patient is unable to participate in goal setting and plan of care. This patients plan of care is appropriate for delegation to MOHIT.      Thank you for this referral.  Alejandro Rubio OT  Time Calculation: 10 mins

## 2017-05-30 NOTE — PROGRESS NOTES
Marky Hartley [facility] [address]  [address] [place] [address]  [phone]      Medical Progress Note      NAME: Jack Abbott   :  3/2/1924  MRM:  [id number]    Date/Time: 2017  12:47 PM         Subjective:     Chief Complaint:  Pt seen and examined. No complaints other than feeling weak. Denies CP, SOB. Tolerating diet. Denies black or bloody stools. Lives at home with her son and daughter in law     ROS:  (bold if positive, if negative)      Mild diffuse weakness        Objective:       Vitals:          Last 24hrs VS reviewed since prior progress note. Most recent are:    Visit Vitals    /75 (BP 1 Location: Right arm, BP Patient Position: At rest)    Pulse 69    Temp 97.7 °F (36.5 °C)    Resp 16    Wt 47.6 kg (104 lb 15 oz)    SpO2 99%    Breastfeeding Unknown    BMI 20.49 kg/m2     SpO2 Readings from Last 6 Encounters:   17 99%   17 100%    O2 Flow Rate (L/min): 4 l/min     Intake/Output Summary (Last 24 hours) at 17 1247  Last data filed at 17 0600   Gross per 24 hour   Intake              735 ml   Output                0 ml   Net              735 ml          Exam:     Physical Exam:    Gen: Frail elderly female.  NAD   HEENT:  Pink conjunctivae, PERRL, hearing intact to voice, moist mucous membranes  Neck:  Supple, without masses, thyroid non-tender  Resp:  No accessory muscle use, clear breath sounds without wheezes rales or rhonchi  Card:  No murmurs, normal S1, S2 without thrills, bruits or peripheral edema  Abd:  Soft, non-tender, non-distended, normoactive bowel sounds are present  Musc:  No cyanosis or clubbing  Skin:  No rashes or ulcers, skin turgor is good  Neuro:  Mild diffuse weakness   Psych:  Good insight, oriented to person, place and time, alert    Medications Reviewed: (see below)    Lab Data Reviewed: (see below)    ______________________________________________________________________    Medications:     Current Facility-Administered Medications   Medication Dose Route Frequency    senna-docusate (PERICOLACE) 8.6-50 mg per tablet 1 Tab  1 Tab Oral DAILY    0.9% sodium chloride infusion 250 mL  250 mL IntraVENous PRN    clopidogrel (PLAVIX) tablet 75 mg  75 mg Oral DAILY    doxepin (SINEquan) capsule 25 mg  25 mg Oral QHS    pantoprazole (PROTONIX) tablet 40 mg  40 mg Oral ACB    ferrous sulfate tablet 325 mg  325 mg Oral ACB    metoprolol succinate (TOPROL-XL) XL tablet 50 mg  50 mg Oral DAILY    simvastatin (ZOCOR) tablet 10 mg  10 mg Oral QHS    solifenacin (VESICARE) tablet 5 mg  5 mg Oral QHS    zolpidem (AMBIEN) tablet 5 mg  5 mg Oral QHS PRN    sodium chloride (NS) flush 5-10 mL  5-10 mL IntraVENous Q8H    sodium chloride (NS) flush 5-10 mL  5-10 mL IntraVENous PRN    acetaminophen (TYLENOL) tablet 650 mg  650 mg Oral Q4H PRN    HYDROcodone-acetaminophen (NORCO) 5-325 mg per tablet 1 Tab  1 Tab Oral Q4H PRN    HYDROmorphone (PF) (DILAUDID) injection 0.5 mg  0.5 mg IntraVENous Q4H PRN    naloxone (NARCAN) injection 0.4 mg  0.4 mg IntraVENous PRN    diphenhydrAMINE (BENADRYL) injection 12.5 mg  12.5 mg IntraVENous Q4H PRN    ondansetron (ZOFRAN) injection 4 mg  4 mg IntraVENous Q6H PRN    cefTRIAXone (ROCEPHIN) 1 g in 0.9% sodium chloride (MBP/ADV) 50 mL  1 g IntraVENous Q24H    dilTIAZem CD (CARDIZEM CD) capsule 120 mg  120 mg Oral DAILY            Lab Review:     Recent Labs      05/30/17   0543  05/29/17   1414   WBC  3.4*  7.3   HGB  7.3*  6.8*   HCT  21.2*  20.4*   PLT  79*  109*     Recent Labs      05/30/17   0543  05/29/17   1414  05/29/17   1343   NA  130*  128*   --    K  4.3  4.9   --    CL  96*  95*   --    CO2  23  24   --    GLU  93  134*   --    BUN  77*  75*   --    CREA  2.13*  2.24*   --    CA  8.6  9.2   --    MG  1.9   --    --    PHOS  4.5   --    --    ALB  2.9*   --    --    SGOT  69*   --    --    ALT  58   --    --    INR   --    --   1.2*     No components found for: [place]         Assessment / Plan:   79 yo hx of HTN, Pafib, CAD, dCHF, CKD 4, presented w/ weakness, UTI, SHANA, symptomatic anemia     1) Weakness: suspect multifactorial. ?deconditioning, UTI, anemia   -PT/OT eval   -see treatment for problems below     2) UTI:   -continue Ceftriaxone   -f/u cultures      3) Symptomatic anemia: unclear etiology.  Does have baseline anemia with acute worsening   -B12/folate/iron panel unrevealing   -check stool blood   -s/p 3u PRBC's      4) CKD 4:  SHANA ruled out   -hold lasix and spironolactone   -stop IVF's as tolerating PO and concerns for causing volume overload      5) CAD/chronic diastolic CHF: stable   -continue plavix for now but stop if stool blood (+)   -holding diuretics   -monitor volume status      6) Pafib:   -continue dilt and metoprolol   -no NOAC likely 2/2 fall risk and age      DNR     Total time spent with patient: [address] discussed with: Patient and Nursing Staff    Discussed:  Code Status, Care Plan and D/C Planning    Prophylaxis:  SCD's    Disposition:  TBD            ___________________________________________________    Attending Physician: Gentry Valle MD

## 2017-05-30 NOTE — PROGRESS NOTES
Bedside and Verbal shift change report given to [address] (oncoming nurse) by Ara SALMON RN(offgoing nurse). Report included the following information SBAR and Kardex.

## 2017-05-30 NOTE — PROGRESS NOTES
Physical Therapy Note:  Consult received and chart reviewed. Attempted to see x2 today but the patient was lethargic and getting blood for the am. This afternoon this writer was received by her son who reports severe right shoulder pain and being sleepy. Apparently she has had this shoulder pain for >1 week with a negative xray but exacerbated when she tried to feed herself this afternoon. Discussed potential injuries that may not appear on xray and therapy's approach of identification via signs and sx. The patient and her son continued to defer for the afternoon but will follow for eval tomorrow. Hakeem Guy, PT, DPT  9 min calculated time.

## 2017-05-30 NOTE — PROGRESS NOTES
[facility]: MED RECONCILIATION    Comments/Recommendations:    Patient's son states, she uses the Methenamine hippurate \"off and on\". Medication(s) ADDED to PTA list:  1. None    Medication(s) REMOVED from PTA list:  1. Doxycycline 100 mg BID  2. Ferrous sulfate 325 mg daily (stopped due to constipation)  3. Solifenacin 5 mg QHS    Medication(s) ADJUSTED on PTA list:  1. Docusate changed to \"prn\"  2. Doxepin changed to \"25-50 mg\"  3. Spironolactone changed to \"12.5-25 mg\" based on fluid retention      Information obtained from: Son (read from Rx bottles over the phone)/ Rx Query    Significant PMH/Disease States:   Past Medical History:   Diagnosis Date    A-fib ([facility] [address]     Acid reflux     Anemia     CAD (coronary artery disease)     Heart failure (HCC)     High cholesterol     Hypertension     UTI (urinary tract infection)        Chief Complaint for this Admission:   Chief Complaint   Patient presents with    Fatigue    Urinary Pain         Allergies: Review of patient's allergies indicates no known allergies. Prior to Admission Medications:     Medication Documentation Review Audit       Reviewed by Charlene Mc PHARMD (Pharmacist) on 05/29/17 at 2045         Medication Sig Documenting Provider Last Dose Status Taking? ceramides 1,3,6-11 (CERAVE) lotn by Apply Externally route daily as needed (dry skin). Debra Martinez MD  Active Yes    clopidogrel (PLAVIX) 75 mg tab Take 75 mg by mouth daily. Debra Martinez MD 5/29/2017 Unknown time Active Yes    dilTIAZem CD (CARDIZEM CD) 120 mg ER capsule Take 120 mg by mouth daily. Historical Provider 5/29/2017 Unknown time Active Yes    docusate sodium (COLACE) 100 mg capsule Take 200 mg by mouth as needed. Historical Provider  Active Yes    doxepin (SINEQUAN) 25 mg capsule Take 25-50 mg by mouth nightly. Debra Martinez MD 5/28/2017 pm Active Yes    esomeprazole (NEXIUM) 40 mg capsule Take 40 mg by mouth Daily (before breakfast).  Debra Martinez MD 5/29/2017 Unknown time Active Yes    furosemide (LASIX) 40 mg tablet Take 40 mg by mouth daily. Debra Martinez MD 5/29/2017 Unknown time Active Yes    mesalamine (DELZICOL) 400 mg cdti capsule Take 400 mg by mouth three (3) times daily as needed (loose stools). Debra Martinez MD  Active Yes    methenamine hippurate (HIPREX) 1 gram tablet Take 1 g by mouth two (2) times daily (with meals). Debra Martinez MD  Active Yes    metoprolol succinate (TOPROL XL) 50 mg XL tablet Take 50 mg by mouth daily. Debra Martinez MD 5/29/2017 Unknown time Active Yes    simvastatin (ZOCOR) 10 mg tablet Take 10 mg by mouth nightly. Debra Martinez MD 5/28/2017 pm Active Yes    spironolactone (ALDACTONE) 25 mg tablet Take 25 mg by mouth daily. Debra Martinez MD 5/29/2017 Unknown time Active Yes    spironolactone (ALDACTONE) 25 mg tablet Take 12.5 mg by mouth as needed (if fluid controlled). Historical Provider  Active Yes    zolpidem (AMBIEN) 5 mg tablet Take 5 mg by mouth nightly.  Debra Martinez MD 5/28/2017 Unknown time Active Yes                        Maci Malik, Nathan Reid Yara   Contact: [phone]

## 2017-05-30 NOTE — CDMP QUERY
Documentation of Acute Kidney Injury/CKD 4\" is noted in the progress notes. Currently the patient does not meet RIFLE criteria (BSV approved) to support this diagnosis. If you are using another criteria to support this diagnosis, please document this in your progress note. Otherwise, please document in the progress notes the clinical indicators that support this diagnosis or state that the diagnosis has been ruled out. Current labs:   5/29 CR 2.24 GFR 20  5/30 CR 2.13 GFR 22  3/5/2017 CR 1.97 GFR 24  2010: CR 1.7 GFR 30    RIFLE  (BSV Approved)    RISK:  Increased SCr x 1.5 or GFR decrease > 25% (within 7 days)    INJURY:  Increased SCr x 2.0 or GFR decreased > 50%    FAILURE:  Increased SCr x 3.0 or GFR decrease > 75% or SCr >4.0 mg/dL or acute increase >0.5 mg/dL    LOSS:  Persistent acute renal failure = complete loss of kidney function > 4 weeks    END STAGE:  End stage of kidney disease > 3 months      AKIN    STAGE  1:  Increase in SCr >/= 0.3 mg/dL or >/= 150% to 200% (1.5 to 2-fold) from baseline (within 48 hours)    STAGE  2:  Increase in SCr to more than 200% to 300% (>2-3 fold) from baseline    STAGE  3:  Increase in SCr to more than 300% (>3-fold) from baseline or SCr >/= 4.0 mg/dL with an acute increase of at least 0.5 mg/dL or initiation of renal replacement therapy      KDIGO    STAGE  1:  Increase in SCr by >/= 0.3 mg/dL within 48 hours or increase in SCr 1.5 to 1.9 times baseline which is known or presumed to have occurred within the prior 7 days    STAGE  2:  Increase in SCr to 2.0 to 2.9 times baseline    STAGE  3:  Increase in SCr to 3.0 times baseline or increase in SCr to >/= baseline or increase in SCr to >/= 4.0 mg/dL or initiation of renal replacement therapy      Please clarify and document your clinical opinion in the progress notes and discharge summary including the definitive and/or presumptive diagnosis, (suspected or probable), related to the above clinical findings.  Please include clinical findings supporting your diagnosis.     Thank you,  Suzan Jain, MSN, [facility] [place] [address]

## 2017-05-30 NOTE — PROGRESS NOTES
Dual skin assessment done with America Epley. Pt terri scale of 17 and skin alterations documented. Repositioning and bed alarm on.

## 2017-05-31 PROBLEM — D69.6 THROMBOCYTOPENIA (HCC): Status: ACTIVE | Noted: 2017-01-01

## 2017-05-31 NOTE — PROGRESS NOTES
Marky Hartley [facility] [address]  [address] [facility], [address] [place] [address]  [phone]      Medical Progress Note      NAME: Alee Gtz   :  3/2/1924  MRM:  [id number]    Date/Time: 2017  12:47 PM         Subjective:     Chief Complaint:  Pt seen and examined. No complaints. Feels improved today. Still with R shoulder pain but pt states some of this is chronic. Has not had a BM yet. Tolerating diet. Elmarie Danish to go home today, but explained need to stay for now    ROS:  (bold if positive, if negative)      Mild diffuse weakness        Objective:       Vitals:          Last 24hrs VS reviewed since prior progress note. Most recent are:    Visit Vitals    BP 97/56    Pulse 70    Temp 97.8 °F (36.6 °C)    Resp 16    Ht 4' 11\" (1.499 m)    Wt 47.6 kg (105 lb)    SpO2 100%    Breastfeeding Unknown    BMI 21.21 kg/m2     SpO2 Readings from Last 6 Encounters:   17 100%   17 100%    O2 Flow Rate (L/min): 3 l/min       Intake/Output Summary (Last 24 hours) at 17 1122  Last data filed at 17 1830   Gross per 24 hour   Intake              590 ml   Output                0 ml   Net              590 ml          Exam:     Physical Exam:    Gen: Frail elderly female.  NAD   HEENT:  Pink conjunctivae, PERRL, hearing intact to voice, moist mucous membranes  Neck:  Supple, without masses, thyroid non-tender  Resp:  No accessory muscle use, clear breath sounds without wheezes rales or rhonchi  Card:  No murmurs, normal S1, S2 without thrills, bruits or peripheral edema  Abd:  Soft, non-tender, non-distended, normoactive bowel sounds are present  Musc:  No cyanosis or clubbing  Skin:  No rashes or ulcers, skin turgor is good  Neuro:  Mild diffuse weakness   Psych:  Good insight, oriented to person, place and time, alert    Medications Reviewed: (see below)    Lab Data Reviewed: (see below)    ______________________________________________________________________    Medications: Current Facility-Administered Medications   Medication Dose Route Frequency    senna-docusate (PERICOLACE) 8.6-50 mg per tablet 1 Tab  1 Tab Oral DAILY    0.9% sodium chloride infusion 250 mL  250 mL IntraVENous PRN    doxepin (SINEquan) capsule 25 mg  25 mg Oral QHS    pantoprazole (PROTONIX) tablet 40 mg  40 mg Oral ACB    ferrous sulfate tablet 325 mg  325 mg Oral ACB    simvastatin (ZOCOR) tablet 10 mg  10 mg Oral QHS    solifenacin (VESICARE) tablet 5 mg  5 mg Oral QHS    zolpidem (AMBIEN) tablet 5 mg  5 mg Oral QHS PRN    sodium chloride (NS) flush 5-10 mL  5-10 mL IntraVENous Q8H    sodium chloride (NS) flush 5-10 mL  5-10 mL IntraVENous PRN    acetaminophen (TYLENOL) tablet 650 mg  650 mg Oral Q4H PRN    HYDROcodone-acetaminophen (NORCO) 5-325 mg per tablet 1 Tab  1 Tab Oral Q4H PRN    naloxone (NARCAN) injection 0.4 mg  0.4 mg IntraVENous PRN    diphenhydrAMINE (BENADRYL) injection 12.5 mg  12.5 mg IntraVENous Q4H PRN    ondansetron (ZOFRAN) injection 4 mg  4 mg IntraVENous Q6H PRN    cefTRIAXone (ROCEPHIN) 1 g in 0.9% sodium chloride (MBP/ADV) 50 mL  1 g IntraVENous Q24H            Lab Review:     Recent Labs      05/31/17   0426  05/30/17   0543  05/29/17   1414   WBC  4.5  3.4*  7.3   HGB  8.9*  7.3*  6.8*   HCT  26.8*  21.2*  20.4*   PLT  69*  79*  109*     Recent Labs      05/31/17   0426  05/30/17   0543  05/29/17   1414  05/29/17   1343   NA  133*  130*  128*   --    K  4.1  4.3  4.9   --    CL  99  96*  95*   --    CO2  24  23  24   --    GLU  91  93  134*   --    BUN  74*  77*  75*   --    CREA  2.12*  2.13*  2.24*   --    CA  8.7  8.6  9.2   --    MG  2.0  1.9   --    --    PHOS   --   4.5   --    --    ALB   --   2.9*   --    --    SGOT   --   69*   --    --    ALT   --   58   --    --    INR   --    --    --   1.2*     No components found for: [place]         Assessment / Plan:   79 yo hx of HTN, Pafib, CAD, dCHF, CKD 4, presented w/ weakness, UTI, SHANA, symptomatic anemia     1) Weakness: suspect multifactorial. ?deconditioning, UTI, anemia   -PT/OT on board   -likely home with home PT/OT   -see treatment for problems below     2) UTI:   -continue Ceftriaxone   -cultures with GNR's > 100k; speciation and sensitivity pending      3) Symptomatic anemia: unclear etiology.  Does have baseline anemia with acute worsening as well as thrombocytopenia now   -B12/folate/iron panel unrevealing   -stool blood pending   -s/p 3u PRBC's   -hold plavix due to thrombocytopenia     4) Thrombocytopenia - gradual downtrending of platelets as well as RBC's   -hold plavix  -will ask heme to assist though doubt pt would want BMB or invasive work-up   -has not been on heparin derivatives to suspect HIT  -hapto pending, but doubt hemolysis and no schistocytes     5) CKD 4:  SHANA ruled out   -hold lasix and spironolactone   -hold IVF's      6) CAD/chronic diastolic CHF: stable   -hold plavix  -holding diuretics      7) Pafib:   -hold dilt and metoprolol due to marginal BP's today   -no NOAC likely 2/2 fall risk and age      DNR     Total time spent with patient: 28 895 [address] discussed with: Patient and Nursing Staff    Discussed:  Code Status, Care Plan and D/C Planning    Prophylaxis:  SCD's    Disposition:  TBD            ___________________________________________________    Attending Physician: Zach Alfaro MD

## 2017-05-31 NOTE — PROGRESS NOTES
Bedside and Verbal shift change report given to Paulina RN (oncoming nurse) by Negin Spencer RN (offgoing nurse). Report included the following information SBAR, Kardex, Procedure Summary, Intake/Output, MAR and Recent Results.

## 2017-05-31 NOTE — CONSULTS
[facility] at [address] [place] [address]  [phone]    Hematology / Oncology Consult    Reason for Visit:   Barbara Bean is a 80 y.o. female who is seen in consultation at the request of Dr. Naz Cruz for evaluation of anemia and thrombocytopenia. History of Present Illness:   Ms Angel Trevizo was admitted on 5/29/2017 from the ED when she presented with c/o weakness and continued dysuria. Recent tx for UTI by [facility] with oral doxy. ED labs Hgb 6.8; UA revealing UTI. Therefore she was admitted for further eval and management. Sitting up eating; reports feeling much better. Denies any pain at present. Has had anemia for years; Took Fe pills for awhile but they made her stomach hurt so stopped. Was taking Mesalamine when she has loose stools but hasn't taken it for quite some time. Aware in [address] [place] [address] and year 2017. Lives with her son and his wife. Uses a walker at home. No family at bedside.     Past Medical History:   Diagnosis Date    A-fib ([facility] [address]     Acid reflux     Anemia     CAD (coronary artery disease)     Heart failure (HCC)     High cholesterol     Hypertension     UTI (urinary tract infection)       Past Surgical History:   Procedure Laterality Date    HX APPENDECTOMY      HX HYSTERECTOMY      HX PACEMAKER      HX PACEMAKER PLACEMENT      HX TONSILLECTOMY        Social History   Substance Use Topics    Smoking status: Never Smoker    Smokeless tobacco: Never Used    Alcohol use No      Family History   Problem Relation Age of Onset    Family history unknown: Yes     Current Facility-Administered Medications   Medication Dose Route Frequency    senna-docusate (PERICOLACE) 8.6-50 mg per tablet 1 Tab  1 Tab Oral DAILY    0.9% sodium chloride infusion 250 mL  250 mL IntraVENous PRN    doxepin (SINEquan) capsule 25 mg  25 mg Oral QHS    pantoprazole (PROTONIX) tablet 40 mg  40 mg Oral ACB    ferrous sulfate tablet 325 mg  325 mg Oral ACB    simvastatin (ZOCOR) tablet 10 mg  10 mg Oral QHS    solifenacin (VESICARE) tablet 5 mg  5 mg Oral QHS    zolpidem (AMBIEN) tablet 5 mg  5 mg Oral QHS PRN    sodium chloride (NS) flush 5-10 mL  5-10 mL IntraVENous Q8H    sodium chloride (NS) flush 5-10 mL  5-10 mL IntraVENous PRN    acetaminophen (TYLENOL) tablet 650 mg  650 mg Oral Q4H PRN    HYDROcodone-acetaminophen (NORCO) 5-325 mg per tablet 1 Tab  1 Tab Oral Q4H PRN    naloxone (NARCAN) injection 0.4 mg  0.4 mg IntraVENous PRN    diphenhydrAMINE (BENADRYL) injection 12.5 mg  12.5 mg IntraVENous Q4H PRN    ondansetron (ZOFRAN) injection 4 mg  4 mg IntraVENous Q6H PRN    cefTRIAXone (ROCEPHIN) 1 g in 0.9% sodium chloride (MBP/ADV) 50 mL  1 g IntraVENous Q24H      No Known Allergies     Review of Systems: A complete review of systems was obtained, negative except as described above. Physical Exam:     Visit Vitals    /65 (BP 1 Location: Left arm, BP Patient Position: Sitting)    Pulse 77    Temp 97.9 °F (36.6 °C)    Resp 16    Ht 4' 11\" (1.499 m)    Wt 47.6 kg (105 lb)    SpO2 98%    Breastfeeding Unknown    BMI 21.21 kg/m2     ECOG PS: 3  General: No distress  Eyes: anicteric sclerae  HENT: Atraumatic, OP clear  Neck: Supple  Lymphatic: No cervical, supraclavicular, or inguinal adenopathy  Respiratory: diminished bases, normal respiratory effort  CV: Normal rate, regular rhythm, no murmurs, no peripheral edema  GI: Soft, nontender, nondistended, no masses, no hepatomegaly, no splenomegaly. Skin: No rashes, ecchymoses, or petechiae. Normal temperature, turgor, and texture. Psych: Alert, oriented, appropriate affect, normal judgment/insight  Neuro: CN II-XII intact    Results:     Lab Results   Component Value Date/Time    WBC 4.5 05/31/2017 04:26 AM    HGB 8.9 05/31/2017 04:26 AM    HCT 26.8 05/31/2017 04:26 AM    PLATELET 69 17/86/4045 04:26 AM    MCV 87.3 05/31/2017 04:26 AM    ABS.  NEUTROPHILS 2.7 05/31/2017 04:26 AM Hemoglobin (POC) 10.9 11/11/2010 05:38 PM    Hematocrit (POC) 32 11/11/2010 05:38 PM     Lab Results   Component Value Date/Time    Sodium 133 05/31/2017 04:26 AM    Potassium 4.1 05/31/2017 04:26 AM    Chloride 99 05/31/2017 04:26 AM    CO2 24 05/31/2017 04:26 AM    Glucose 91 05/31/2017 04:26 AM    BUN 74 05/31/2017 04:26 AM    Creatinine 2.12 05/31/2017 04:26 AM    GFR est AA 26 05/31/2017 04:26 AM    GFR est non-AA 22 05/31/2017 04:26 AM    Calcium 8.7 05/31/2017 04:26 AM    Sodium (POC) 139 11/11/2010 05:38 PM    Potassium (POC) 3.8 11/11/2010 05:38 PM    Chloride (POC) 99 11/11/2010 05:38 PM    Glucose (POC) 231 11/15/2010 04:30 PM    BUN (POC) 33 11/11/2010 05:38 PM    Creatinine (POC) 1.7 11/11/2010 05:38 PM    Calcium, ionized (POC) 1.41 11/11/2010 05:38 PM     Lab Results   Component Value Date/Time    Bilirubin, total 0.5 05/30/2017 05:43 AM    ALT (SGPT) 58 05/30/2017 05:43 AM    AST (SGOT) 69 05/30/2017 05:43 AM    Alk. phosphatase 98 05/30/2017 05:43 AM    Protein, total 6.1 05/30/2017 05:43 AM    Albumin 2.9 05/30/2017 05:43 AM    Globulin 3.2 05/30/2017 05:43 AM     Lab Results   Component Value Date/Time    Reticulocyte count 2.8 05/29/2017 08:40 PM    Iron % saturation 49 05/29/2017 08:40 PM    TIBC 368 05/29/2017 08:40 PM    Ferritin 130 05/29/2017 08:40 PM    Vitamin B12 1635 05/29/2017 08:40 PM    Folate 8.6 05/29/2017 08:40 PM     05/29/2017 08:40 PM    TSH 2.61 05/29/2017 08:40 PM     Lab Results   Component Value Date/Time    INR (POC) 1.2 05/29/2017 01:43 PM    aPTT 23.8 11/11/2010 05:55 PM     Lab Results   Component Value Date/Time     05/29/2017 08:40 PM     5/29/2017 XR SHOULDER RT  No acute abnormality. Assessment and Recommendations:   1. Anemia, normocytic (s/p 2 units PRBCs 5/30/2017)  Low retic suggests a production issue. Likely in part due to CKD. Labwork otherwise unremarkable thus far.   I am worried about a possible bone marrow pathology such as MDS, given the thrombocytopenia. However, she states she had a bone marrow biopsy a few years ago. We will see if we can track down those results. Check some additional labs. Ultimately, she may benefit from procrit. 2.Thrombocytopenia  New in the past few months. Uncertain etiology. Concern for bone marrow pathology, as above. Though perhaps infection is contributing. Additional labs added for am. Request prior BMBx results    3. UTI  Abx coverage per hopsitalist  Urine culture: GNR    4. CKD  Worsening since March     Patient seen in conjunction with Bhavna Trevino NP.        Signed By: Maddy Dawson MD     May 31, 2017

## 2017-05-31 NOTE — PROGRESS NOTES
Occupational Therapy Goals  Initiated 5/30/2017  1. Patient will perform grooming and upper body dressing seated EOB with supervision/set-up within 7 day(s). 2. Patient will perform lower body dressing with minimal assistance/contact guard assist within 7 day(s). 3. Patient will perform toilet transfers with moderate assistance using RW and BSC as needed within 7 day(s) - MET 5/31/2017 and [facility] using RW. 4. Patient will perform all aspects of toileting with moderate assistance within 7 day(s). 5. Patient will participate in upper extremity therapeutic exercise/activities with supervision/set-up for 10 minutes within 7 day(s). 6. Patient will utilize energy conservation techniques during functional activities with verbal and visual cues within 7 day(s). Occupational Therapy TREATMENT  Patient: Alberto Pretty (45 y.o. female)  Date: 5/31/2017  Diagnosis: Symptomatic anemia Symptomatic anemia       Precautions: Fall, DNR    ASSESSMENT:  Pt is making steady progress toward goals. She is awake and alert today, eating her lunch up in the chair upon arrival.  She required min A to amb to toilet in bathroom using RW, CGA to stand at sink to wash hands and mod A to manage her slipper socks. She desires to return home with [facility] therapy, her aide and son assisting her. Progression toward goals:  []       Improving appropriately and progressing toward goals  [x]       Improving slowly and progressing toward goals  []       Not making progress toward goals and plan of care will be adjusted     PLAN:  Patient continues to benefit from skilled intervention to address the above impairments. Continue treatment per established plan of care. Discharge Recommendations:  Home Health and 24/7 assist for safety  Further Equipment Recommendations for Discharge:  TBD     SUBJECTIVE:   Patient stated I feel much better today.     OBJECTIVE DATA SUMMARY:   Cognitive/Behavioral Status:  Neurologic State: Alert; Appropriate for age  Orientation Level: Oriented X4  Cognition: Follows commands; Appropriate for age attention/concentration  Perception: Appears intact  Perseveration: No perseveration noted  Safety/Judgement: Awareness of environment; Fall prevention; Insight into deficits    Functional Mobility and Transfers for ADLs:  Bed Mobility:  Rolling: Moderate assistance  Supine to Sit: Maximum assistance  Scooting: Moderate assistance    Transfers:  Sit to Stand: Contact guard assistance; Additional time;Assist x1 (from chair with arms)  Functional Transfers  Bathroom Mobility: Minimum assistance (using RW- mild LOB)  Toilet Transfer : Minimum assistance; Additional time;Assist x1 (using grab bar and RW- A for controled sitting)  Cues: Physical assistance; Tactile cues provided;Verbal cues provided;Visual cues provided    Balance:  Sitting: Impaired  Sitting - Static: Fair (occasional)  Sitting - Dynamic: Poor (constant support)  Standing: Impaired; With support  Standing - Static: Constant support;Occassional  Standing - Dynamic : Poor    ADL Intervention:  Grooming  Washing Hands: Contact guard assistance (standing at sink using RW)  Cues: Tactile cues provided;Verbal cues provided;Visual cues provided  Adaptive Equipment:  (RW)    Lower Body Dressing Assistance  Socks: Moderate assistance (pt is able to doff, but mod A to thread back over toes)  Leg Crossed Method Used: No  Position Performed: Bending forward method;Seated in chair  Cues: Don;Doff;Physical assistance; Tactile cues provided;Verbal cues provided;Visual cues provided    Cognitive Retraining  Safety/Judgement: Awareness of environment; Fall prevention; Insight into deficits    Pain:  Pt with no c/o pain    Activity Tolerance:   Fair  Please refer to the flowsheet for vital signs taken during this treatment.   After treatment:   [x] Patient left in no apparent distress sitting up in chair  [] Patient left in no apparent distress in bed  [x] Call bell left within reach  [x] Nursing notified  [] Caregiver present  [] Bed alarm activated    COMMUNICATION/COLLABORATION:   The patients plan of care was discussed with: Registered Nurse    Alfonso Chacon, OT  Time Calculation: 14 mins

## 2017-05-31 NOTE — PROGRESS NOTES
Problem: Mobility Impaired (Adult and Pediatric)  Goal: *Acute Goals and Plan of Care (Insert Text)  Physical Therapy Goals  Initiated 5/31/2017  1. Patient will move from supine to sit and sit to supine , scoot up and down and roll side to side in bed with moderate assistance within 7 day(s). 2. Patient will transfer from bed to chair and chair to bed with minimal assistance/contact guard assist using the least restrictive device within 7 day(s). 3. Patient will perform sit to stand with minimal assistance/contact guard assist within 7 day(s). 4. Patient will ambulate with minimal assistance/contact guard assist for 50 feet with the least restrictive device within 7 day(s). PHYSICAL THERAPY EVALUATION  Patient: Angel Luis Hamm (4 y.o. female)  Date: 5/31/2017  Primary Diagnosis: Symptomatic anemia        Precautions:  Fall, DNR      ASSESSMENT :  Based on the objective data described below, the patient is 81 yo female who is frail and thin with advanced kyphosis, scoliosis, rounded shoulders and forward head posture, admitted for symptomatic anemia and complaints of severe right shoulder pain. Shoulders examined - no signs of subluxation or dislocated, poor scapular humeral rhythm and forward flexion limited to 80 degrees elevation. Patient with history of cervical spine and right shoulder pain, likely advanced OA and DJD but she reports right shoulder pain much worse than previous baseline. She denies fall or trauma to shoulder. Reports pain at rest and with movement. Radiographs negative at this time for fracture. Patient able to weight bear through BUEs to [address] [place] [address] for amb. Suspect possible impingement or rotator cuff partial tear(s) due to poor strength and AROM RUE. Balance very poor and slow short step length with moderate assistance to advance RW and prevent patient from falling. Patient should not be OOB unless assisted. Reviewed fall prevention with patient who reports understanding.  Patient alert and oriented x 4, good historian and alert at time of PT evaluation. If patient returns to home with family, she will need 24/7 supervision and hands on assistance via gait belt for all mobility. Recommend PT daily to increase independence with functional mobility. Per patient she was Independent with ADLs and mobility prior to 9 days ago when she became ill with UTI and has since declined in functional status. Patient will benefit from skilled intervention to address the above impairments. Patients rehabilitation potential is considered to be Guarded  Factors which may influence rehabilitation potential include:   [ ]         None noted  [ ]         Mental ability/status  [X]         Medical condition  [X]         Home/family situation and support systems  [ ]         Safety awareness  [ ]         Pain tolerance/management  [ ]         Other:        PLAN :  Recommendations and Planned Interventions:  [X]           Bed Mobility Training             [ ]    Neuromuscular Re-Education  [X]           Transfer Training                   [ ]    Orthotic/Prosthetic Training  [X]           Gait Training                         [ ]    Modalities  [X]           Therapeutic Exercises           [ ]    Edema Management/Control  [X]           Therapeutic Activities            [X]    Patient and Family Training/Education  [ ]           Other (comment):     Frequency/Duration: Patient will be followed by physical therapy  5 times a week to address goals. Discharge Recommendations: Home Health  Further Equipment Recommendations for Discharge: none       SUBJECTIVE:   Patient stated It takes my walking a little while to get started first thing in the morning.       OBJECTIVE DATA SUMMARY:   HISTORY:    Past Medical History:   Diagnosis Date    A-fib ([facility] [address]      Acid reflux      Anemia      CAD (coronary artery disease)      Heart failure (HCC)      High cholesterol      Hypertension      UTI (urinary tract infection) Past Surgical History:   Procedure Laterality Date    HX APPENDECTOMY        HX HYSTERECTOMY        HX PACEMAKER        HX PACEMAKER PLACEMENT        HX TONSILLECTOMY         Prior Level of Function/Home Situation: she reports independent until 9 days ago  Personal factors and/or comorbidities impacting plan of care: 79 yo, CAD, Heart Failure, UTI, Anemia, Pacemaker     Home Situation  Home Environment: Private residence  # Steps to Enter: 0  Wheelchair Ramp: Yes  One/Two Story Residence: One story  Living Alone: No  Support Systems: Child(mikael)  Patient Expects to be Discharged to[de-identified] Private residence  Current DME Used/Available at Home: Walker, rolling, Wheelchair, [address] chair  Tub or Shower Type: Shower     EXAMINATION/PRESENTATION/DECISION MAKING:   Critical Behavior:  Neurologic State: Alert  Orientation Level: Oriented X4  Cognition: Appropriate decision making, Appropriate for age attention/concentration, Appropriate safety awareness, Follows commands  Safety/Judgement: Awareness of environment, Fall prevention, Insight into deficits  Hearing: Auditory  Auditory Impairment: Hard of hearing, bilateral  Hearing Aids/Status: Bilateral  Skin: frail thin skin, easy to tear. Small skin tear on posterior left leg, bloody drainage in bed     Range Of Motion:  AROM: Generally decreased, functional (BLEs, RUE limited to 80 degrees elevation)           PROM: Generally decreased, functional (functional BLEs but RUE limited to passive 80 degrees FF)           Strength:    Strength: Grossly decreased, non-functional                    Tone & Sensation:   Tone: Normal                              Coordination:  Coordination: Grossly decreased, non-functional  Functional Mobility:  Bed Mobility:  Rolling: Moderate assistance  Supine to Sit: Maximum assistance     Scooting: Moderate assistance  Transfers:  Sit to Stand: Moderate assistance  Stand to Sit: Moderate assistance  Stand Pivot Transfers:  Moderate assistance Balance:   Sitting: Impaired  Sitting - Static: Fair (occasional)  Sitting - Dynamic: Poor (constant support)  Standing: Impaired; With support  Standing - Static: Constant support;Occassional  Standing - Dynamic : Poor  Ambulation/Gait Training:  Distance (ft): 5 Feet (ft)  Assistive Device: Gait belt;Walker, rolling  Ambulation - Level of Assistance: Moderate assistance (needs mod to advance RW)        Gait Abnormalities: Antalgic;Decreased step clearance        Base of Support: Narrowed     Speed/Sujatha: Shuffled  Step Length: Left shortened;Right shortened        Stairs - Level of Assistance:  (NT)                    Functional Measure:  Barthel Index 40/100      G codes: In compliance with CMSs Claims Based Outcome Reporting, the following G-code set was chosen for this patient based on their primary functional limitation being treated: The outcome measure chosen to determine the severity of the functional limitation was the Barthel Index with a score of 25/100 which was correlated with the impairment scale.       · Mobility - Walking and Moving Around:               - CURRENT STATUS:    CL - 60%-79% impaired, limited or restricted               - GOAL STATUS:           CK - 40%-59% impaired, limited or restricted               - D/C STATUS:                       ---------------To be determined---------------      Physical Therapy Evaluation Charge Determination   History Examination Presentation Decision-Making   HIGH Complexity :3+ comorbidities / personal factors will impact the outcome/ POC  LOW Complexity : 1-2 Standardized tests and measures addressing body structure, function, activity limitation and / or participation in recreation  LOW Complexity : Stable, uncomplicated  Other outcome measures Barthel Index MEDIUM      Based on the above components, the patient evaluation is determined to be of the following complexity level: LOW      Pain: 5/10 shoulder pain with AROM or PROM to 80 degress flexion or abduction                    Activity Tolerance:   Poor - very debilitated  Please refer to the flowsheet for vital signs taken during this treatment. After treatment:   [X]         Patient left in no apparent distress sitting up in chair  [ ]         Patient left in no apparent distress in bed  [X]         Call bell left within reach  [X]         Nursing notified  [ ]         Caregiver present  [ ]         Bed alarm activated      COMMUNICATION/EDUCATION:   The patients plan of care was discussed with: Registered Nurse.  [X]         Fall prevention education was provided and the patient/caregiver indicated understanding. [X]         Patient/family have participated as able in goal setting and plan of care. [ ]         Patient/family agree to work toward stated goals and plan of care. [ ]         Patient understands intent and goals of therapy, but is neutral about his/her participation. [ ]         Patient is unable to participate in goal setting and plan of care.      Thank you for this referral.  Kathryn Mueller, PT   Time Calculation: 23 mins

## 2017-05-31 NOTE — PROGRESS NOTES
Bedside shift change report given to SESAR RN (oncoming nurse) by Jeff Severino RN (offgoing nurse). Report included the following information SBAR, Kardex, Intake/Output, MAR and Accordion.

## 2017-06-01 NOTE — ROUTINE PROCESS
0730: Bedside and Verbal shift change report given to [address] (oncoming nurse) by Paulina MAHER (offgoing nurse). Report included the following information SBAR, Kardex, Intake/Output, Recent Results and Cardiac Rhythm Paced.

## 2017-06-01 NOTE — PROGRESS NOTES
1:51 PM  Patient left unit via wheelchair transported by a volunteer accompanied by PCT to meet son.

## 2017-06-01 NOTE — PROGRESS NOTES
I have reviewed discharge instructions with the patient and guardian. The patient and guardian verbalized understanding.

## 2017-06-01 NOTE — DISCHARGE SUMMARY
Physician Discharge Summary     Patient ID:  Sona Adams  [id number]  57 y.o.  3/2/1924    Admit date: 5/29/2017    Discharge date and time: 6/1/2017    Admission Diagnoses: Symptomatic anemia    Discharge Diagnoses/Hospital Course   79 yo hx of HTN, Pafib, CAD, dCHF, CKD 4, presented w/ weakness, UTI, symptomatic anemia      1) Weakness: suspect multifactorial. ?deconditioning, UTI, anemia. Improved. Evaluated by PT/OT. Home health PT/OT arranged by JOSE C       2) UTI: initially on Ceftriaxone. Cultures grew pansensitive Citrobacter. Will d/c on Omnicef to complete course of antibiotics      3) Symptomatic anemia: unclear etiology. Does have baseline anemia with acute worsening as well as thrombocytopenia now. B12/folate/iron panel unrevealing. Retic count inappropriately low. Apparently had a BMB in the past; heme/onc attempting to get the results. May have a primary bone marrow disorder. Will need outpt follow-up and possibly Procrit. Hemoglobin now stable s/p 3u PRBC's. Plavix held due to thrombocytopenia but if platelets recover, PCP can resume      4) Thrombocytopenia - gradual downtrending of platelets during as well as RBC's. Now both beginning to improve. See above      5) CKD 4: SHANA ruled out. Baseline creatinine appears to be in the 1.7 to 1.9. BP would not tolerate spironolactone so this was stopped. PCP to reassess      6) CAD/chronic diastolic CHF: stable. Off plavix as above. Off spironolactone as above. PCP to reassess in the next week       7) Pafib: on dilt but unable to concurrently tolerate metoprolol. No NOAC due to fall risk, age and thrombocytopenia. PCP to reassess BP in one week.  May be able to tolerate resumption of metoprolol     PCP: Clarisse Calderón MD     Consults: Hematology/Oncology    Discharge Exam:  Visit Vitals    /65 (BP 1 Location: Right arm, BP Patient Position: At rest)    Pulse 86    Temp 97.7 °F (36.5 °C)    Resp 18    Ht 4' 11\" (1.499 m)    Wt 47.6 kg (105 lb)    SpO2 94%    Breastfeeding Unknown    BMI 21.21 kg/m2     Gen: Frail elderly female. NAD   HEENT: Pink conjunctivae, PERRL, hearing intact to voice, moist mucous membranes  Neck: Supple, without masses, thyroid non-tender  Resp: No accessory muscle use, clear breath sounds without wheezes rales or rhonchi  Card: No murmurs, normal S1, S2 without thrills, bruits or peripheral edema  Abd: Soft, non-tender, non-distended, normoactive bowel sounds are present  Musc: No cyanosis or clubbing  Skin: No rashes or ulcers, skin turgor is good  Neuro: Mild diffuse weakness   Psych: Good insight, oriented to person, place and time, alert     Disposition: home    Patient Instructions:   Current Discharge Medication List      START taking these medications    Details   cefdinir (OMNICEF) 300 mg capsule Take 1 Cap by mouth two (2) times a day for 3 days. Qty: 6 Cap, Refills: 0         CONTINUE these medications which have NOT CHANGED    Details   zolpidem (AMBIEN) 5 mg tablet Take 5 mg by mouth nightly. dilTIAZem CD (CARDIZEM CD) 120 mg ER capsule Take 120 mg by mouth daily. !! doxepin (SINEQUAN) 25 mg capsule Take 50 mg by mouth nightly as needed. mesalamine (DELZICOL) 400 mg cdti capsule Take 400 mg by mouth three (3) times daily as needed (loose stools). furosemide (LASIX) 40 mg tablet Take 40 mg by mouth daily. ceramides 1,3,6-11 (CERAVE) lotn by Apply Externally route daily as needed (dry skin). methenamine hippurate (HIPREX) 1 gram tablet Take 1 g by mouth two (2) times daily (with meals). esomeprazole (NEXIUM) 40 mg capsule Take 40 mg by mouth Daily (before breakfast). !! doxepin (SINEQUAN) 25 mg capsule Take 25 mg by mouth nightly. simvastatin (ZOCOR) 10 mg tablet Take 10 mg by mouth nightly. docusate sodium (COLACE) 100 mg capsule Take 200 mg by mouth as needed. !! - Potential duplicate medications found. Please discuss with provider.       STOP taking these medications       spironolactone (ALDACTONE) 25 mg tablet Comments:   Reason for Stopping:         spironolactone (ALDACTONE) 25 mg tablet Comments:   Reason for Stopping:         clopidogrel (PLAVIX) 75 mg tab Comments:   Reason for Stopping:         metoprolol succinate (TOPROL XL) 50 mg XL tablet Comments:   Reason for Stopping:         ferrous sulfate 325 mg (65 mg iron) tablet Comments:   Reason for Stopping:         solifenacin (VESICARE) 5 mg tablet Comments:   Reason for Stopping:             Activity: Activity as tolerated  Diet: Cardiac diet  Wound Care: None needed    Follow-up with Carlene Rosenthal MD in one week   Please follow-up with Dr. Laurie Najera (hematology) as advised     Approximate time spent in patient care on day of discharge: 34 minutes     Signed:  Nannette Blackman MD  6/1/2017  10:41 AM

## 2017-06-01 NOTE — DISCHARGE INSTRUCTIONS
HOSPITALIST DISCHARGE INSTRUCTIONS  NAME: Rosalba Richard   :  3/2/1924   MRN:  [id number]     Date/Time:  2017 7:27 AM    ADMIT DATE: 2017     DISCHARGE DATE: 2017     ADMITTING DIAGNOSIS:  Urinary tract infection   Acute worsening of baseline creatinine   Altered mental status   Anemia/thrombocytopenia    DISCHARGE DIAGNOSIS:  As above     MEDICATIONS:  The following meds were held due to various reasons:   1) Metoprolol => your blood pressure was too low to resume (your average blood pressure systolic \"top\" number was in the 100's   2) Plavix => your platelets were too low to resume   3) Spironolactone => your kidney numbers were elevated and your blood pressure was low     It is possible you may not need to be back on these medicines however, please follow-up in one week with your primary MD so he can help make this determination      · It is important that you take the medication exactly as they are prescribed. · Keep your medication in the bottles provided by the pharmacist and keep a list of the medication names, dosages, and times to be taken in your wallet. · Do not take other medications without consulting your doctor. Pain Management: per above medications    What to do at Home    Recommended diet:  Cardiac Diet    Recommended activity: Activity as tolerated    If you experience any of the following symptoms then please call your primary care physician or return to the emergency room if you cannot get hold of your doctor:  Fever, chills, nausea, vomiting, diarrhea, change in mentation, falling, bleeding, shortness of breath, chest pain     Follow Up:  Dr. Dinorah Mccartney MD  you are to call and set up an appointment to see him early next week   Follow-up Information     Follow [facility] With Details [facility]    [facility]  [address]  [phone]          Please follow-up with Dr. Anthony Valencia in 1-2 weeks.  Please call to schedule an appointment ([phone])    Information obtained by :  I understand that if any problems occur once I am at home I am to contact my physician. I understand and acknowledge receipt of the instructions indicated above.                                                                                                                                            Physician's or R.N.'s Signature                                                                  Date/Time                                                                                                                                              Patient or Representative Signature                                                          Date/Time

## 2017-06-01 NOTE — PROGRESS NOTES
[id number] [facility] at [facility]  [phone]    Hematology / Oncology Follow up    Reason for Visit:   Jack Abbott is a 80 y.o. female who is seen in consultation at the request of Dr. Teodora Dominguez for evaluation of anemia and thrombocytopenia. History of Present Illness:   Ms Keny Cardoza was admitted on 5/29/2017 from the ED when she presented with c/o weakness and continued dysuria. Recent tx for UTI by [facility] with oral doxy. ED labs Hgb 6.8; UA revealing UTI. Therefore she was admitted for further eval and management. Sitting up eating; reports feeling much better. Denies any pain at present. Has had anemia for years; Took Fe pills for awhile but they made her stomach hurt so stopped. Was taking Mesalamine when she has loose stools but hasn't taken it for quite some time. Aware in [facility] and year 2017. Lives with her son and his wife. Uses a walker at home. No family at bedside. Interval History:   States feeling pretty good; did not sleep well; denies SOB or N/V this am. Denies any pain. States had Bone Marrow bx done about 3 years ago at [facility] ordered by her cardiologist Dr Jonathan Lugo. No family at bedside.          Current Facility-Administered Medications   Medication Dose Route Frequency    dilTIAZem CD (CARDIZEM CD) capsule 120 mg  120 mg Oral DAILY    senna-docusate (PERICOLACE) 8.6-50 mg per tablet 1 Tab  1 Tab Oral DAILY    0.9% sodium chloride infusion 250 mL  250 mL IntraVENous PRN    doxepin (SINEquan) capsule 25 mg  25 mg Oral QHS    pantoprazole (PROTONIX) tablet 40 mg  40 mg Oral ACB    ferrous sulfate tablet 325 mg  325 mg Oral ACB    simvastatin (ZOCOR) tablet 10 mg  10 mg Oral QHS    solifenacin (VESICARE) tablet 5 mg  5 mg Oral QHS    zolpidem (AMBIEN) tablet 5 mg  5 mg Oral QHS PRN    sodium chloride (NS) flush 5-10 mL  5-10 mL IntraVENous Q8H    sodium chloride (NS) flush 5-10 mL  5-10 mL IntraVENous PRN    acetaminophen (TYLENOL) tablet 650 mg  650 mg Oral Q4H PRN    HYDROcodone-acetaminophen (NORCO) 5-325 mg per tablet 1 Tab  1 Tab Oral Q4H PRN    naloxone (NARCAN) injection 0.4 mg  0.4 mg IntraVENous PRN    diphenhydrAMINE (BENADRYL) injection 12.5 mg  12.5 mg IntraVENous Q4H PRN    ondansetron (ZOFRAN) injection 4 mg  4 mg IntraVENous Q6H PRN    cefTRIAXone (ROCEPHIN) 1 g in 0.9% sodium chloride (MBP/ADV) 50 mL  1 g IntraVENous Q24H      No Known Allergies     Review of Systems: A complete review of systems was obtained, negative except as described above. Physical Exam:     Visit Vitals    /62 (BP 1 Location: Right arm, BP Patient Position: At rest)    Pulse 69    Temp 98 °F (36.7 °C)    Resp 18    Ht 4' 11\" (1.499 m)    Wt 47.6 kg (105 lb)    SpO2 92%    Breastfeeding Unknown    BMI 21.21 kg/m2     ECOG PS: 3  General: No distress  Eyes: Anicteric sclerae  HENT: Atraumatic  Neck: Supple  Respiratory: Normal respiratory effort  CV: No peripheral edema  GI: Soft, nontender, nondistended, no masses, no hepatomegaly, no splenomegaly  Skin: No rashes, ecchymoses, or petechiae  Psych: Alert, oriented, appropriate affect, fair judgment/insight    Results:     Lab Results   Component Value Date/Time    WBC 5.4 06/01/2017 04:30 AM    HGB 8.7 06/01/2017 04:30 AM    HCT 26.7 06/01/2017 04:30 AM    PLATELET 77 14/68/6900 04:30 AM    MCV 87.8 06/01/2017 04:30 AM    ABS.  NEUTROPHILS 2.6 06/01/2017 04:30 AM    Hemoglobin (POC) 10.9 11/11/2010 05:38 PM    Hematocrit (POC) 32 11/11/2010 05:38 PM     Lab Results   Component Value Date/Time    Sodium 134 06/01/2017 04:30 AM    Potassium 4.1 06/01/2017 04:30 AM    Chloride 100 06/01/2017 04:30 AM    CO2 24 06/01/2017 04:30 AM    Glucose 130 06/01/2017 04:30 AM    BUN 70 06/01/2017 04:30 AM    Creatinine 1.98 06/01/2017 04:30 AM    GFR est AA 29 06/01/2017 04:30 AM    GFR est non-AA 24 06/01/2017 04:30 AM    Calcium 9.1 06/01/2017 04:30 AM Sodium (POC) 139 11/11/2010 05:38 PM    Potassium (POC) 3.8 11/11/2010 05:38 PM    Chloride (POC) 99 11/11/2010 05:38 PM    Glucose (POC) 231 11/15/2010 04:30 PM    BUN (POC) 33 11/11/2010 05:38 PM    Creatinine (POC) 1.7 11/11/2010 05:38 PM    Calcium, ionized (POC) 1.41 11/11/2010 05:38 PM     Lab Results   Component Value Date/Time    Bilirubin, total 0.5 05/30/2017 05:43 AM    ALT (SGPT) 58 05/30/2017 05:43 AM    AST (SGOT) 69 05/30/2017 05:43 AM    Alk. phosphatase 98 05/30/2017 05:43 AM    Protein, total 6.1 05/30/2017 05:43 AM    Albumin 2.9 05/30/2017 05:43 AM    Globulin 3.2 05/30/2017 05:43 AM     Lab Results   Component Value Date/Time    Reticulocyte count 2.8 05/29/2017 08:40 PM    Iron % saturation 49 05/29/2017 08:40 PM    TIBC 368 05/29/2017 08:40 PM    Ferritin 130 05/29/2017 08:40 PM    Vitamin B12 1635 05/29/2017 08:40 PM    Folate 8.6 05/29/2017 08:40 PM    Haptoglobin 106 05/29/2017 08:40 PM     05/29/2017 08:40 PM    TSH 2.61 05/29/2017 08:40 PM     Lab Results   Component Value Date/Time    INR 1.2 06/01/2017 07:20 AM    aPTT 28.9 06/01/2017 07:20 AM    Fibrinogen 242 06/01/2017 07:20 AM     Lab Results   Component Value Date/Time     05/29/2017 08:40 PM     5/29/2017 XR SHOULDER RT  No acute abnormality. Assessment and Recommendations:   1. Anemia, normocytic (s/p 2 units PRBCs 5/30/2017)  Low retic suggests a production issue. Likely in part due to CKD. Labwork otherwise unremarkable thus far. Concern for possible Bone Marrow pathology such as MDS  Will obtain records from previous Bone Marrow bx done according to pt at [facility].  Ultimately, she may benefit from procrit. 2.Thrombocytopenia  New in the past few months. Uncertain etiology. Concern for bone marrow pathology, as above. Though perhaps infection is contributing. Additional labs pending. Request prior [facility] results from [facility]    Improved  this am    3.  UTI  Abx coverage per hopsitalist  Urine culture: GNR    4. CKD  Worsening since March     Plan reviewed with Dr Peri Hernandez By: Cynthia Owens NP     June 1, 2017

## 2017-06-01 NOTE — PROGRESS NOTES
CM Note:  Order for home health noted. A referral was sent in [facility] to Tuan Butler. Pt accepted.   NIKA Garcia

## 2017-06-28 PROBLEM — J81.0 ACUTE PULMONARY EDEMA (HCC): Status: RESOLVED | Noted: 2017-01-01 | Resolved: 2017-01-01

## 2017-06-28 PROBLEM — D64.9 SYMPTOMATIC ANEMIA: Status: RESOLVED | Noted: 2017-01-01 | Resolved: 2017-01-01

## 2017-06-28 PROBLEM — A41.9 SEPTIC SHOCK (HCC): Status: ACTIVE | Noted: 2017-01-01

## 2017-06-28 PROBLEM — J96.00 ACUTE RESPIRATORY FAILURE (HCC): Status: RESOLVED | Noted: 2017-01-01 | Resolved: 2017-01-01

## 2017-06-28 PROBLEM — R65.21 SEPTIC SHOCK (HCC): Status: ACTIVE | Noted: 2017-01-01

## 2017-06-28 PROBLEM — R53.1 WEAKNESS: Status: RESOLVED | Noted: 2017-01-01 | Resolved: 2017-01-01

## 2017-06-28 PROBLEM — R74.01 TRANSAMINITIS: Status: ACTIVE | Noted: 2017-01-01

## 2017-06-28 PROBLEM — N39.0 UTI (URINARY TRACT INFECTION): Status: RESOLVED | Noted: 2017-01-01 | Resolved: 2017-01-01

## 2017-06-28 PROBLEM — R77.8 TROPONIN LEVEL ELEVATED: Status: RESOLVED | Noted: 2017-01-01 | Resolved: 2017-01-01

## 2017-06-28 PROBLEM — N17.9 AKI (ACUTE KIDNEY INJURY) (HCC): Status: RESOLVED | Noted: 2017-01-01 | Resolved: 2017-01-01

## 2017-06-28 PROBLEM — E87.1 HYPONATREMIA: Status: ACTIVE | Noted: 2017-01-01

## 2017-06-28 PROBLEM — T68.XXXA HYPOTHERMIA: Status: ACTIVE | Noted: 2017-01-01

## 2017-06-28 NOTE — PROGRESS NOTES
[facility] Dosing Services: Antimicrobial Stewardship Progress Note    Consult for antibiotic dosing of Zosyn and Vanc by Dr. Misty Peres  Pharmacist reviewed antibiotic appropriateness for 80year old , female  for indication of HCAP  Day of Therapy 1    Plan:  Vancomycin therapy:  Start Vancomycin therapy, with loading dose of 750 (mg) (16mg/kg)  Follow with maintenance dose to be dosed by level  Dose calculated to approximate a therapeutic trough of 15-20mcg/mL. Last trough level / Plan for level: 24-48 hours after due to renal function  Pharmacy to follow daily and will make changes to dose and/or frequency based on clinical status. Non-Kinetic Antimicrobial Dosing:   Current Regimen:  Zosyn 3.375g every 12 hours  Recommendation: Continue current dose  Other Antimicrobial  (not dosed by pharmacist)   None   Cultures     6/28 Urine cx NGSF  6/28 Blood cx NGSF   Serum Creatinine     Lab Results   Component Value Date/Time    Creatinine 3.10 06/28/2017 03:27 PM    Creatinine (POC) 1.7 11/11/2010 05:38 PM       Creatinine Clearance Estimated Creatinine Clearance: 8.1 mL/min (based on Cr of 3.1).      Temp   (!) 94.8 °F (34.9 °C)      WBC   Lab Results   Component Value Date/Time    WBC 5.5 06/28/2017 04:39 PM       H/H   Lab Results   Component Value Date/Time    HGB 6.5 06/28/2017 04:39 PM        Platelets   Lab Results   Component Value Date/Time    PLATELET 23 22/80/1169 04:39 PM        Rojelio Paddock, PharmD, BCPS  Contact information:

## 2017-06-28 NOTE — ED TRIAGE NOTES
Pt presents with son to ED who states that two weeks ago he took [facility] to [facility] and was diagnosed with UTI and placed on Levaquin, PCP changed antibiotic to amoxicillin and then on Monday with culture report resulted pt was changed to macrodantin. Pt states that she is weak and pelvic burning.

## 2017-06-28 NOTE — ED NOTES
Spoke with Elvin RN 3rd floor step down states nursing supervisor will be switching pt to and ICU bed.

## 2017-06-28 NOTE — ED NOTES
Pt remains alert and oriented, resting with eyes closed. Son at bedside updated on current plan of care. Explained antibiotics and IVF rational. Son verbalizes good understanding. Son and pt have spoken with Dr Ale Landeros concerning code statis and verbalized DNR is patients wish.

## 2017-06-28 NOTE — ED NOTES
Pt spo2 dropped to 90 % on RA Dr Rocío Royal called to the bedside. Pt placed on high flow non rebreather.

## 2017-06-28 NOTE — ED NOTES
Spoke with Sonny Hilario from the lab who reports platelet count at 21 and is unable to result the rest of the cbc at this time because of the condition of the specimen and running a smear at this time.

## 2017-06-28 NOTE — ED NOTES
Spoke with Dr Agueda Boles updated on pt statis and vital signs. Dr Iva Rebolledo made aware of Dr hTeresa Lamar request to speak with pt and son concerning IV pressors and central line placement. Dr Iva Rebolledo in to speak with pt and son . Patient and son verbalized understanding. Pt declines central line and pressors at this time. Son in agreement with pts wish.

## 2017-06-28 NOTE — ED NOTES
[address] Code Purple SBAR    SIRS Criteria rectal temp 34.9 degrees C. WBC still pending  Mental Status Level of Consciousness: Responds to Voice, alert and appropriate when spoken to. Labs/Lactic Acid 7.0    Fluid resuscitation total 1250mL     Antibiotics Zosyn infused. Vasopressor not ordered.     Visit Vitals    BP 90/55 (BP 1 Location: Right arm, BP Patient Position: At rest)    Pulse 65    Temp (!) 94.8 °F (34.9 °C)    Resp 16    Ht 4' 11\" (1.499 m)    Wt 45.4 kg (100 lb)    SpO2 99%    BMI 20.2 kg/m2

## 2017-06-28 NOTE — ED NOTES
Attempted to call report to [facility] step down unable at this time.

## 2017-06-28 NOTE — ED NOTES
Pt son at bed side has been updated by Dr Iva Rebolledo on pts critical statis. Son verbalized understanding.

## 2017-06-28 NOTE — ROUTINE PROCESS
TRANSFER - OUT REPORT:    Verbal report given to STACY Mcallister RN(name) on Judith Dorman  being transferred to [facility] ICU 3005(unit) for routine progression of care       Report consisted of patients Situation, Background, Assessment and   Recommendations(SBAR). Information from the following report(s) SBAR, vitals, telemetry, MAR, all test results was reviewed with the receiving nurse. Lines:   Peripheral IV 06/28/17 Left Antecubital (Active)   Site Assessment Clean, dry, & intact 6/28/2017  4:10 PM   Phlebitis Assessment 0 6/28/2017  4:10 PM   Infiltration Assessment 0 6/28/2017  4:10 PM   Dressing Status Clean, dry, & intact 6/28/2017  4:10 PM   Dressing Type Transparent 6/28/2017  4:10 PM   Hub Color/Line Status Blue 6/28/2017  4:10 PM       Peripheral IV 06/28/17 Left External jugular (Active)   Site Assessment Clean, dry, & intact 6/28/2017  4:48 PM   Phlebitis Assessment 0 6/28/2017  4:48 PM   Infiltration Assessment 0 6/28/2017  4:48 PM   Dressing Status Clean, dry, & intact 6/28/2017  4:48 PM   Dressing Type Transparent 6/28/2017  4:48 PM   Hub Color/Line Status Pink 6/28/2017  4:48 PM        Opportunity for questions and clarification was provided.

## 2017-06-28 NOTE — ED NOTES
Dr Alton Rojas aware of rectal temp. Pt moved to RM 13 sophie paws heating blanket placed. Lucia cath to temp sencing monitor 34.2 at this time.

## 2017-06-28 NOTE — PROGRESS NOTES
Will ask ED MD to clarify pressors prior to transport as BP has dropped even after fluid bolus.  May need central line to stabilize prior to transport and if pressors needed, will need to change LOC to ICU

## 2017-06-28 NOTE — ED PROVIDER NOTES
HPI Comments: The patient was diagnosed with a UTI 2 weeks ago, initially treated with Levaquin, changed to amoxicillin because of possible mental status changes and then recently changed to Macrodantin after a urine culture came back. She is brought in by her son today because of continued dysuria, but the son also states that the patient continues to be confused and he has noticed decreased mobility. There is no history of fever, vomiting, cough, shortness of breath or abdominal pain, but the patient does admit to having occasional headache and chest pain. She was admitted approximately one month ago for weakness, UTI, anemia and thrombocytopenia. Patient is a 80 y.o. female presenting with urinary tract infection. Bladder Infection    Pertinent negatives include no nausea and no vomiting. Past Medical History:   Diagnosis Date    [facility]     Acid reflux     Anemia     CAD (coronary artery disease)     Heart failure (HCC)     High cholesterol     Hypertension     UTI (urinary tract infection)        Past Surgical History:   Procedure Laterality Date    HX APPENDECTOMY      HX HYSTERECTOMY      HX PACEMAKER      HX PACEMAKER PLACEMENT      HX TONSILLECTOMY           Family History:   Problem Relation Age of Onset    Family history unknown: Yes       Social History     Social History    Marital status:      Spouse name: N/A    Number of children: N/A    Years of education: N/A     Occupational History    Not on file. Social History Main Topics    Smoking status: Never Smoker    Smokeless tobacco: Never Used    Alcohol use No    Drug use: No    Sexual activity: Not on file     Other Topics Concern    Not on file     Social History Narrative         ALLERGIES: Review of patient's allergies indicates no known allergies. Review of Systems   Constitutional: Positive for activity change. Negative for fever. Eyes: Negative for visual disturbance.    Respiratory: Negative for cough, shortness of breath and wheezing. Cardiovascular: Positive for chest pain. Negative for leg swelling. Gastrointestinal: Negative for abdominal pain, diarrhea, nausea and vomiting. Genitourinary: Positive for dysuria. Musculoskeletal: Negative. Negative for back pain and neck stiffness. Skin: Negative for rash. Neurological: Positive for weakness and headaches. Negative for syncope. Psychiatric/Behavioral: Positive for confusion. Vitals:    06/28/17 1445   BP: 141/55   Pulse: 71   Resp: 12   SpO2: 97%   Weight: 45.4 kg (100 lb)   Height: 4' 11\" (1.499 m)            Physical Exam   Constitutional: No distress. HENT:   Mouth/Throat: Oropharynx is clear and moist.   Old bruise to r face   Eyes: Pupils are equal, round, and reactive to light. Neck: Normal range of motion. Neck supple. Cardiovascular: Normal rate and regular rhythm. No murmur heard. Pulmonary/Chest: Effort normal and breath sounds normal. No respiratory distress. Abdominal: Soft. There is no tenderness. Musculoskeletal: Normal range of motion. She exhibits no edema. 1 + leg edema bilat   Neurological: She is alert. She has normal strength. No cranial nerve deficit. Generalized weakness   Skin: Skin is warm and dry. Psychiatric: She has a normal mood and affect. Her behavior is normal.   Nursing note and vitals reviewed. MDM  Number of Diagnoses or Management Options  Anemia, unspecified type:   Hyponatremia:   Sepsis, due to unspecified organism [facility]):    Thrombocytopenia ([facility]):      Amount and/or Complexity of Data Reviewed  Clinical lab tests: ordered and reviewed  Obtain history from someone other than the patient: yes  Review and summarize past medical records: yes  Discuss the patient with other providers: yes  Independent visualization of images, tracings, or specimens: yes    Risk of Complications, Morbidity, and/or Mortality  Presenting problems: high  Diagnostic procedures: high  Management options: high    Critical Care  Total time providing critical care: > 105 minutes    ED Course       Procedures ED EKG interpretation:  Rhythm: paced rhythm, rate 68. This EKG was interpreted by Brenden Calderón MD,ED Provider. Spoke c dr. Ramon Smith. She will adm. Spoke c pt son. Pt has been made a DNR.     530 pm- spoke c pt and son about central line and pressors ( bp now 81/50) . Pt is emphatic that this not be done. Son agrees.

## 2017-06-28 NOTE — ED NOTES
TRANSFER - OUT REPORT:    Verbal report given to DIGNA Carvajal Paramedic with [facility](name) on Melvina Veronica  being transferred to [facility] RM 3005(unit) for routine progression of care       Report consisted of patients Situation, Background, Assessment and   Recommendations(SBAR). Information from the following report(s) SBAR, MAR, telemetry all test results, DNR statis,  was reviewed with Mateo Rascon Paramedic. Pt asesment unchanged from previous, warming blanket removed as pt is now normothermic. Blood pressure stable on transfer with a map greater than 65. And reevaluated by Dr Giselle Mercedes who spoke to Dr Shira Thornton. Lines:   Peripheral IV 06/28/17 Left Antecubital (Active)   Site Assessment Clean, dry, & intact 6/28/2017  4:10 PM   Phlebitis Assessment 0 6/28/2017  4:10 PM   Infiltration Assessment 0 6/28/2017  4:10 PM   Dressing Status Clean, dry, & intact 6/28/2017  4:10 PM   Dressing Type Transparent 6/28/2017  4:10 PM   Hub Color/Line Status Blue 6/28/2017  4:10 PM       Peripheral IV 06/28/17 Left External jugular (Active)   Site Assessment Clean, dry, & intact 6/28/2017  4:48 PM   Phlebitis Assessment 0 6/28/2017  4:48 PM   Infiltration Assessment 0 6/28/2017  4:48 PM   Dressing Status Clean, dry, & intact 6/28/2017  4:48 PM   Dressing Type Transparent 6/28/2017  4:48 PM   Hub Color/Line Status Pink 6/28/2017  4:48 PM        Opportunity for questions and clarification was provided. Patient transported with: /hr, Vanco 750mg full cardiac monitoring.

## 2017-06-28 NOTE — PROGRESS NOTES
Spoke with Dr. Martha Ramirez. He apparently spoke with patient and son about code status (pt made DNR). They also confirmed they would not want pressors or a central line. Pt appears to be in septic shock with BP's dropping even with sepsis bundle fluid bolus and IVF's. Lactate 7.0. If no improvement with arrival to [address] will likely need to discuss comfort measures with family.

## 2017-06-29 NOTE — H&P
[place] [address]  [phone]    Admission History and Physical      NAME:  Sergio Jones   :   3/2/1924   MRN:  [id number]     PCP:  Klever Fields MD     Date/Time:  2017         Subjective:     CHIEF COMPLAINT: \"I don't feel good\"     HISTORY OF PRESENT ILLNESS:     Ms. Ilda Shah is a 80 y.o.  female with PMH of a-fib, CAD, HTN, XOL and recurrent UTI's admitted for suspected septic shock. Per pt's son, pt was recently treated for a UTI. Was initially given levofloxacin but PCP changed it to Amoxicillin. She was found to be resistant to this and then changed to nitrofurantoin. Per son, appeared to improve after the levofloxacin but then declined thereafter. Pt does report some ab pain and black stools though son questions this as her mental status has been \"off\". Son confirms that she would want to be DNR/DNI, would not want a central line and risks of peripheral pressors in a 81 yo female discussed and son would not want this either. She is amenable to IVF's, antibiotics and blood products. Past Medical History:   Diagnosis Date    A-fib ([address]     Acid reflux     Anemia     CAD (coronary artery disease)     Heart failure (HCC)     High cholesterol     Hypertension     UTI (urinary tract infection)         Past Surgical History:   Procedure Laterality Date    HX APPENDECTOMY      HX HYSTERECTOMY      HX PACEMAKER      HX PACEMAKER PLACEMENT      HX TONSILLECTOMY         Social History   Substance Use Topics    Smoking status: Never Smoker    Smokeless tobacco: Never Used    Alcohol use No        Family History   Problem Relation Age of Onset    Family history unknown: Yes        No Known Allergies     Prior to Admission medications    Medication Sig Start Date End Date Taking? Authorizing Provider   zolpidem (AMBIEN) 5 mg tablet Take 5 mg by mouth nightly.     Phys Other, MD   dilTIAZem CD (CARDIZEM CD) 120 mg ER capsule Take 120 mg by mouth daily. Historical Provider   doxepin (SINEQUAN) 25 mg capsule Take 50 mg by mouth nightly as needed. Historical Provider   mesalamine (DELZICOL) 400 mg cdti capsule Take 400 mg by mouth three (3) times daily as needed (loose stools). Debra Martinez MD   furosemide (LASIX) 40 mg tablet Take 40 mg by mouth daily. Debra Martinez MD   ceramides 1,3,6-11 (CERAVE) lotn by Apply Externally route daily as needed (dry skin). Debra Martinez MD   methenamine hippurate (HIPREX) 1 gram tablet Take 1 g by mouth two (2) times daily (with meals). Debra Martinez MD   esomeprazole (NEXIUM) 40 mg capsule Take 40 mg by mouth Daily (before breakfast). Debra Martinez MD   doxepin (SINEQUAN) 25 mg capsule Take 25 mg by mouth nightly. Debra Martinez MD   simvastatin (ZOCOR) 10 mg tablet Take 10 mg by mouth nightly. Debra Martinez MD   docusate sodium (COLACE) 100 mg capsule Take 200 mg by mouth as needed. Historical Provider         Review of Systems:  Pt altered      Objective:      VITALS:    Vital signs reviewed; most recent are:    Visit Vitals    BP 94/58    Pulse 71    Temp 97.9 °F (36.6 °C)    Resp 23    Ht 4' 11\" (1.499 m)    Wt 45.4 kg (100 lb)    SpO2 100%    BMI 20.2 kg/m2     SpO2 Readings from Last 6 Encounters:   06/28/17 100%   06/01/17 94%   03/06/17 100%    O2 Flow Rate (L/min): 10 l/min   No intake or output data in the 24 hours ending 06/28/17 2016         Exam:     Physical Exam:    Gen: Cachetic female. Weak. Fatigued   Neck:  Supple, without masses, thyroid non-tender  Resp:  No accessory muscle use, clear breath sounds without wheezes rales or rhonchi  Card:  No murmurs, normal S1, S2 without thrills, bruits or peripheral edema  Abd: Mild diffuse tenderness. No rebound/guarding.    Lymph:  No cervical adenopathy  Musc:  No cyanosis or clubbing  Skin: Diffuse ecchymosis  Neuro: Mild diffusely weak   Psych: Poor insight  Moderate cap refill  Mod distal pulses Labs:    Recent Labs      06/28/17   1639   WBC  5.5   HGB  6.5*   HCT  20.9*   PLT  23*     Recent Labs      06/28/17   1527   NA  127*   K  4.3   CL  91*   CO2  19*   GLU  223*   BUN  61*   CREA  3.10*   CA  9.2   ALB  3.5   SGOT  146*   ALT  144*     No components found for: GLPOC  No results for input(s): PH, PCO2, PO2, HCO3, FIO2 in the last 72 hours. Recent Labs      06/28/17   1527   INR  1.8*     CXR => Right pleural effusion and associated atelectasis has worsened. Pulmonary fluid overload has worsened mildly. EKG => AV paced rhythm       Assessment/Plan:    Septic shock - unclear source. Possible bacteremia from recent UTI. Urine currently with 1+ bacteria. ? intra-ab source   -admit to stepdown   -continue IVF's   -f/u blood and urine cultures   -continue zosyn/vanco for now   -start stress dose steroids   -pt does not want pressors   -if digresses overnight will transition to comfort measures       CAD (coronary artery disease), native coronary artery (2/4/2011) - trop elevated. ?2/2 ARF   -trend CE's   -hold ASA for now as unclear cause of anemia; no chest pain  -check TTE       AF (atrial fibrillation) ([facility] (2/4/2011)   -hold dilt due to hypotension   -not on NOAC due to age and fall risk       Elevated troponin (3/4/2017) - no chest pain. Suspect 2/2 renal dysfunction rather than cardiac event   -trend CE's   -as above, check TTE   -cards eval if uptrends       Anemia (3/4/2017) - ? GI bleed.  Pt does have a h/o anemia with recent worsening   -check stool blood   -start IV PPI BID   -transfuse 2u PRBC's now   -check b12/folate/ferritin/peripheral smear   -will ask heme to eval; if makes it past the first 24 hours and improves, may need BMB       ARF (acute renal failure) ([facility]) (3/4/2017)/CKD (chronic kidney disease) stage 4, GFR 15-29 ml/min ([facility]) (5/29/2017) - suspect 2/2 hypotension, shock   -continue IVF's   -monitor volume status   -willis in place; strict I's and O's       Diastolic CHF, chronic ([facility] (5/29/2017)  -as above, watch volume status   -strict I's and O's, daily weights       Thrombocytopenia ([facility] (5/31/2017) - ?2/2 sepsis, ?bone marrow dysfunction   -monitor; type and screen in the event of transfusion       Hypothermia (6/28/2017) - 2/2 #1   -improved; Rick CERDAN   -check TSH, cortisol to ensure no other underlying pathology       Hyponatremia (6/28/2017) - likely 2/2 poor PO intake  -monitor       Transaminitis (6/28/2017) - ?shock liver ? ab pathology  -check U/S   -repeat LFT's in the AM   -may need CT but too unstable for transport to CT now     Surrogate decision maker: Son     Total time spent with patient: [address] discussed with: Patient and Family    Discussed:  Code Status, Care Plan and D/C Planning    Prophylaxis:  SCD's    Probable Disposition:  Home w/Family           ___________________________________________________    Attending Physician: Christie Ramon MD

## 2017-06-29 NOTE — PROGRESS NOTES
Brief note-full note to follow. Our team met with patient's son and reviewed current medical issues. Will transition to comfort care. Her son wants to try to go home briefly so will hold on stopping BIPAP until he returns. Discussed with Dr. Francy Stewart and bedside nurse(Mary).

## 2017-06-29 NOTE — ROUTINE PROCESS
0700 Bedside shift change report received from [facility]. SBAR, Kardex, ED Summary, Procedure Summary, Intake/Output, Recent Results and Cardiac Rhythm AFib reviewed. 1100  Bedside report given to Vince Baptiste RN. SBAR, Kardex, Intake/Output and Cardiac Rhythm AFib reviewed. Patient is stable at this time. Call light within reach, bed alarm on, bed in low position. 1230:  Resumed care from Vince Baptiste. Updated on patient status. 1900  Bedside report given to Huang Stiles'MAR'' Us, RN. SBAR, Kardex, Intake/Output, Recent Results and Cardiac Rhythm AFib reviewed. Patient is stable at this time. Call light within reach, bed alarm on, bed in low position.

## 2017-06-29 NOTE — PROGRESS NOTES
I met again with pt.'s son to discuss pt.'s disposition. Pt lives with her son and daughter-in-law in  [facility] [place]. Son's name is Rj Smith. His number is [phone]. Son's wife has multiple sclerosis which is progressing rapidly. She sees Dr Shirley Shannon @ [facility] Never wife is being considered for a new MS infusion as she has difficulty lifting her arms and walking recently. Recently,pt was seen @ a [facility] and also by her PCP,Dr Narda Ibanez. Pt was readmitted to [facility] on 6/28/17 with sepsis. Prior to admission,pt typically ambulated with a rolling walker. Recently,due to her illness her son was concerned that she may fall so pt has been using her wheelchair. At home,there is an entry ramp into their one-story home. Pt gets her prescriptions filled @ the [facility]. Pt .'s last hospitalization was from 5/28/17 to 6/1/17. At that time,pt was discharged home with home health services through [facility]. Previously,pt was in inpatient rehab @ [address] and @ [facility]. As pt.'s son does not want invasive measures,I discussed the option of pt going home with hospice. I will provide son with a list of home hospice agencies in the [address] in case he chooses home hospice. Case Management will continue to assist pt with disposition choices. Palliative Medicine has been consulted as pt is a readmission with a RRAT score of 25 to address goals of care. Homar Humphries    Addendum-09:18 am) As son is currently meeting with the Palliative Medicine Team,I left the list of several home hospice agencies in pt.'s rom. I will follow-up with Palliative after their meeting with pt.'s son.   Homar Humphries

## 2017-06-29 NOTE — PROGRESS NOTES
Visited pt with Palliative Medicine: Dr. Tamanna Swan,  Harleen and Luis Angel. Pt's son, Lisa Amezquita at bedside. Dr. Tamanna Swan spoke with Lisa Amezquita in conference room about goals of care for the pt. Lisa Amezquita is ready to transition pt to comfort care, however he wishes to go home prior to the transition. He expects his sister-in-law to arrive soon to sit with pt. He had an older brother who  around [place] shortly after birth. This was some two years prior to [facility] birth. He indicated that the pt continued to grieve over that loss all of her life. Greyson's wife is bed bound with MS and he is her care giver as well. Chaplains will follow as needed.   Shaun Sotoin, MDiv, MS, [address]  [facility] ([id number])

## 2017-06-29 NOTE — PROGRESS NOTES
Spiritual Care Assessment/Progress Notes    Jillian Campo [id number]  [id number]    3/2/1924  80 y.o.  female    Patient Telephone Number: [phone] (home)   Presybeterian Affiliation: [facility]   Language: English   Extended Emergency Contact Information  Primary Emergency Contact: [facility] Phone: [phone]  Mobile Phone: [phone]  Relation: Son  Secondary Emergency Contact: Raghu Oliver Phone: [phone]  Relation: Child   Patient Active Problem List    Diagnosis Date Noted    Hypothermia 06/28/2017    Hyponatremia 06/28/2017    Septic shock ([facility] 06/28/2017    Transaminitis 06/28/2017    Thrombocytopenia ([facility] 05/31/2017    CKD (chronic kidney disease) stage 4, GFR 15-29 ml/min ([facility] 10/98/8470    Diastolic CHF, chronic ([facility] 05/29/2017    Elevated troponin 03/04/2017    Anemia 03/04/2017    ARF (acute renal failure) ([facility] 03/04/2017    CAD (coronary artery disease), native coronary artery 02/04/2011    AF (atrial fibrillation) ([facility] 02/04/2011    Arrhythmia sinus bradycardia 02/04/2011        Date: 6/28/2017       Level of Presybeterian/Spiritual Activity:  []         Involved in drake tradition/spiritual practice    []         Not involved in drake tradition/spiritual practice  []         Spiritually oriented    []         Claims no spiritual orientation    []         seeking spiritual identity  []         Feels alienated from Oriental orthodox practice/tradition  []         Feels angry about Oriental orthodox practice/tradition  [x]         Spirituality/Oriental orthodox tradition a resource for coping at this time.   [x]         Not able to assess due to medical condition    Services Provided Today:  []         crisis intervention    []         reading Scriptures  []         spiritual assessment    [x]         prayer  [x]         empathic listening/emotional support  []         rites and rituals (cite in comments)  []         life review     []         Oriental orthodox support  [] theological development   []         advocacy  []         ethical dialog     []         blessing  []         bereavement support    [x]         support to family  [x]         anticipatory grief support   []         help with AMD  []         spiritual guidance    []         meditation      Spiritual Care Needs  [x]         Emotional Support  [x]         Spiritual/Sabianist Care  []         Loss/Adjustment  []         Advocacy/Referral                /Ethics  []         No needs expressed at               this time  []         Other: (note in               comments)  [address]  [x]         Follow up visits with               pt/family  []         Provide materials  []         Schedule sacraments  []         Contact Community               Clergy  []         Follow up as needed  []         Other: (note in               comments)     Comments: Ms. Polly Roque son, Anayeli Pineda, requested the presence of a  as she was declining. I provided pastoral support as he shared about her life and personality. She is a person of strong Oriental orthodox drake, as is he, and he has come to accept that she may die tonight. I said a bedside prayer and informed him of my ongoing availability throughout the night if needed. He was appreciative. . Jeramy Garg M.Div, [facility]

## 2017-06-29 NOTE — PROGRESS NOTES
Her son has returned to bedside and once again reviewed comfort measures. He would like to proceed. Will premedicate with morphine 2 mg IV, robinul 0.2 mg IV, and ativan 1 mg now. Will wait 10 minutes before removing BIPAP.  Discussed with beside nurse(Mary)

## 2017-06-29 NOTE — PROGRESS NOTES
Prognosis guarded. As per prior notes, pt is DNR/DNI. Does not want pressors (central or peripheral). Amenable to IVF's, IV antibiotics and blood products for now. If any decompensation overnight, son would want to transition to comfort measures.

## 2017-06-29 NOTE — PROGRESS NOTES
I discussed pt with the Palliative Medicine Team and attending. Pt will be transitioned into comfort care once he returns back to the hospital .Son wanted to chek on his wife who has debilitating MS. Once he returns,pt will be transitioned into compassionate comfort care/measures.   Nick Carrillo

## 2017-06-29 NOTE — PROGRESS NOTES
HYPOGLYCEMIC EPISODE DOCUMENTATION    Patient with hypoglycemic episode(s) at 0620(time) on 6/29/17(date). BG value(s) pre-treatment 39    Was patient symptomatic?  [] yes, [x] no  Patient was treated with the following rescue medications/treatments: [x] D50                [] Glucose tablets                [] Glucagon                [] 4oz juice                [] 6oz reg soda                [] 8oz low fat milk  BG value post-treatment: 104  Once BG treated and value greater than 80mg/dl, pt was provided with the following:  [] snack  [] meal  Name of MD notified:Dr. Tawnya Carter  The following orders were received: change fluids to D5NS @ 75mL/hr

## 2017-06-29 NOTE — CONSULTS
[address] [facility] Oncology at [facility]  [phone]    Hematology / Oncology Consult    Reason for Visit:   Venancio Page is a 80 y.o. female who is seen in consultation at the request of Dr. Martinez Muñiz for evaluation of profound anemia; thrombocytopenia; possibly secondary to sepsis. History of Present Illness:   Ms Malika Abreu was admitted on 6/28/2017 from the ED with c/o per patient's son recent UTI treatment and increased confusion. ED labs Hgb 6.5  plt 23 Na 127 Creat 3.1 SGOT 146  . Therefore she was admitted for further eval and management. Ms Malika Abreu on BiPAP; eyes closed. Little verbal communication. Ms Blancas's son, Mike Moulton at bedside. Shares his mom has been in and out of the hospital several times this year and each time it takes a toll on her. She has had recurrent UTIs and a few weeks ago she was started on some medicine but it seemed to make her confused so the PCP changed the medication but when the culture came back they needed to change her medicine again. At home she was up walking with her walker until yesterday. Confusion has been the biggest problem. Took her back to ED at [facility] and once she go there she declined quickly. Mike Moulton shares that she did have a bone marrow bx done approx 2 years ago at [facility]; reported that everything was good. At this time, Mike Moulton would like his mother kept comfortable and is not interested any obtaining an additional bone marrow bx or any additional invasive testing.        Past Medical History:   Diagnosis Date    A-fib ([address]     Acid reflux     Anemia     CAD (coronary artery disease)     Heart failure (HCC)     High cholesterol     Hypertension     UTI (urinary tract infection)       Past Surgical History:   Procedure Laterality Date    HX APPENDECTOMY      HX HYSTERECTOMY      HX PACEMAKER      HX PACEMAKER PLACEMENT      HX TONSILLECTOMY        Social History   Substance Use Topics    Smoking status: Never Smoker    Smokeless tobacco: Never Used    Alcohol use No      Family History   Problem Relation Age of Onset    Family history unknown: Yes     Current Facility-Administered Medications   Medication Dose Route Frequency    dextrose 5% and 0.9% NaCl infusion  75 mL/hr IntraVENous CONTINUOUS    sodium chloride (NS) flush 5-10 mL  5-10 mL IntraVENous Q8H    sodium chloride (NS) flush 5-10 mL  5-10 mL IntraVENous PRN    acetaminophen (TYLENOL) tablet 650 mg  650 mg Oral Q4H PRN    naloxone (NARCAN) injection 0.4 mg  0.4 mg IntraVENous PRN    ondansetron (ZOFRAN) injection 4 mg  4 mg IntraVENous Q4H PRN    insulin lispro (HUMALOG) injection   SubCUTAneous Q6H    glucose chewable tablet 16 g  4 Tab Oral PRN    dextrose (D50W) injection syrg 12.5-25 g  12.5-25 g IntraVENous PRN    glucagon (GLUCAGEN) injection 1 mg  1 mg IntraMUSCular PRN    Vancomycin Dose by Level   Other Rx Dosing/Monitoring    piperacillin-tazobactam (ZOSYN) 3.375 g in 0.9% sodium chloride (MBP/ADV) 100 mL  3.375 g IntraVENous Q12H    0.9% sodium chloride infusion 250 mL  250 mL IntraVENous PRN    pantoprazole (PROTONIX) 40 mg in sodium chloride 0.9 % 10 mL injection  40 mg IntraVENous Q12H    hydrocortisone Sod Succ (PF) (SOLU-CORTEF) injection 100 mg  100 mg IntraVENous Q8H    morphine injection 1 mg  1 mg IntraVENous Q2H PRN      No Known Allergies     Review of Systems: A complete review of systems was obtained, negative except as described above.     Physical Exam:     Visit Vitals    /68    Pulse 77    Temp 98 °F (36.7 °C)    Resp 9    Ht 4' 11\" (1.499 m)    Wt 48 kg (105 lb 13.1 oz)    SpO2 94%    Breastfeeding No    BMI 21.37 kg/m2     ECOG PS: 4  General: cachetic; critically ill appearing  Eyes: anicteric sclerae  HENT: Atraumatic, OP clear  Neck supple  Respiratory: BiPAP in use; normal respiratory effort  CV: Normal rate, regular rhythm, no murmurs, no peripheral edema  GI: Soft, nontender, nondistended, no masses, no hepatomegaly, no splenomegaly  Skin: Numerous ecchymoses,  Normal temperature, poor turgor, and texture. Psych: Poor insight      Results:     Lab Results   Component Value Date/Time    WBC 6.2 06/29/2017 03:30 AM    HGB 9.1 06/29/2017 03:30 AM    HCT 27.9 06/29/2017 03:30 AM    PLATELET 29 41/44/8740 03:30 AM    MCV 87.5 06/29/2017 03:30 AM    ABS. NEUTROPHILS 4.8 06/29/2017 03:30 AM    Hemoglobin (POC) 10.9 11/11/2010 05:38 PM    Hematocrit (POC) 32 11/11/2010 05:38 PM     Lab Results   Component Value Date/Time    Sodium 133 06/29/2017 02:58 AM    Potassium 4.6 06/29/2017 02:58 AM    Chloride 96 06/29/2017 02:58 AM    CO2 20 06/29/2017 02:58 AM    Glucose 83 06/29/2017 02:58 AM    BUN 60 06/29/2017 02:58 AM    Creatinine 3.07 06/29/2017 02:58 AM    GFR est AA 17 06/29/2017 02:58 AM    GFR est non-AA 14 06/29/2017 02:58 AM    Calcium 8.5 06/29/2017 02:58 AM    Sodium (POC) 139 11/11/2010 05:38 PM    Potassium (POC) 3.8 11/11/2010 05:38 PM    Chloride (POC) 99 11/11/2010 05:38 PM    Glucose (POC) 142 06/29/2017 07:40 AM    BUN (POC) 33 11/11/2010 05:38 PM    Creatinine (POC) 1.7 11/11/2010 05:38 PM    Calcium, ionized (POC) 1.41 11/11/2010 05:38 PM     Lab Results   Component Value Date/Time    Bilirubin, total 1.4 06/29/2017 02:58 AM    ALT (SGPT) 237 06/29/2017 02:58 AM    AST (SGOT) 270 06/29/2017 02:58 AM    Alk.  phosphatase 234 06/29/2017 02:58 AM    Protein, total 6.2 06/29/2017 02:58 AM    Albumin 3.2 06/29/2017 02:58 AM    Globulin 3.0 06/29/2017 02:58 AM     Lab Results   Component Value Date/Time    Reticulocyte count 2.8 05/29/2017 08:40 PM    Iron % saturation 49 05/29/2017 08:40 PM    TIBC 368 05/29/2017 08:40 PM    Ferritin 130 05/29/2017 08:40 PM    Vitamin B12 1635 05/29/2017 08:40 PM    Folate 8.6 05/29/2017 08:40 PM    Haptoglobin 116 06/28/2017 08:53 PM    Haptoglobin 106 05/29/2017 08:40 PM     06/28/2017 08:53 PM    TSH 1.63 06/28/2017 04:41 PM Hep C  virus Ab Interp. NONREACTIVE 06/01/2017 07:20 AM     Lab Results   Component Value Date/Time    INR 1.8 06/28/2017 03:27 PM    aPTT 37.4 06/28/2017 03:27 PM    Fibrinogen 316 06/28/2017 03:27 PM     Lab Results   Component Value Date/Time     06/28/2017 08:53 PM     6/28/2017 XR CHEST  IMPRESSION: Right pleural effusion and associated atelectasis has worsened. Pulmonary fluid overload has worsened mildly. 6/28/2017 US ABD COMP  IMPRESSION:    Increased hepatic a few is likely related to hepatic steatosis.   Status post cholecystectomy. Bilateral effusions with ascites. 6/29/2017 XR CHEST  IMPRESSION:  1. No change mild pulmonary edema  2. No change pleural effusions with bibasilar atelectasis    Assessment and Recommendations:   1. Sepsis  Unclear etiology; possibly UTI  Blood cultures NGTD  Urine culture pending  Abx coverage      2. Anemia (s/p 2 units PRBCs)  Unclear etiology possibly secondary to CKD vs  production problem such as MDS given thrombocytopenia. Attempted to obtain previous Bone Marrow bx from [facility] during last hospitalization but records not available. Due to patient being critically ill and family requesting no invasive testing at this time, recommend continued transfusion support. 3. Thrombocytopenia  Unclear etiology   Concern for bone marrow pathology vs infection  No additional testing at this time due to possible transition    4. Goals of care  Family requesting comfort measures  Palliative team consulted  Discussed with palliative team      Patient seen in conjunction with Cristi Kyle NP.       Signed By: Santy Galarza MD     June 29, 2017

## 2017-06-29 NOTE — CDMP QUERY
2. Please clarify if this patient is being treated/managed for:    =>Metabolic encephalopathy in the setting of Septic shock/confusion  =>Other Explanation of clinical findings  =>Unable to Determine (no explanation of clinical findings)    The medical record reflects the following clinical findings, treatment, and risk factors:    Risk Factors: Septic shock/presumed UTI    Clinical Indicators: Little verbal communication, confusion PTA; H/P: \" At home she was up walking with her walker until yesterday. Confusion has been the biggest problem. Took her back to ED at [facility] and once she go there she declined quickly. \"    Treatment: Comfort measures, Bipap, Palliative consult    Please clarify and document your clinical opinion in the progress notes and discharge summary including the definitive and/or presumptive diagnosis, (suspected or probable), related to the above clinical findings. Please include clinical findings supporting your diagnosis.     Thank you,  Chon Ahn, MSN, [address] [place] [address]

## 2017-06-29 NOTE — PROGRESS NOTES
Occupational Therapy Note:  Discontinue OT orders acknowledged. Plan is for patient to transition to comfort care at this time. Please re-consult if plan changes. Thank you.   Lidia Rivas, OTR/L

## 2017-06-29 NOTE — PROGRESS NOTES
Palliative Medicine  [place]: [phone]  [facility]: [phone])      Resuscitation Status: DNR   Durable DNR addressed? [] Yes   [x] No    [] Not Applicable *Will plan to address if pt survives to discharge    Advance Care Planning 6/29/2017   Patient's Healthcare Decision Maker is: Legal Next of Kin   Primary Decision Maker Name Jeremi Poole   Primary Decision Maker Phone Number   cell [phone] Relationship to Patient Adult child   Confirm Advance Directive -   Patient Would Like to Complete Advance Directive -   Does the patient have other document types -           Patient / Family Encounter Documentation    Participants (names): Son Manjula Wing, Palliative Medicine (Dr. Ekaterina Bal, NP Fellow REMY Hollins)    Narrative: Visited with pt before meeting separately with son. Pt was resting in bed with bipap in place, roused briefly and greeted team.  Son shared that pt moved into his home 8 yrs ago after the death of her . Manjula Wing is pt's only living child; pt lost a son Christian Brar at 2 yrs of age, before Manjula Wing was born. Pt herself is one of 12 siblings, grew up [place] a farm. Pt previously enjoyed gardening and seeing others take pleasure in her cooking. Son shared that pt has been hospitalized 5 times this year, both at [facility] in [place] and at [facility]. Son verbalized understanding that even with continued treatment pt may not improve to prior level of functioning. Son expressed wish to focus on pt's comfort, understanding that life expectancy is very limited. Psychosocial Issues Identified: Son also cares for wife with advanced MS, shared that in the past he took care of himself by going for daily walks but has not been able to do so as much recently, expressed hope that he will resume this practice in the near future.   Son is active in his Muslim and is president of a club, stated he \"bites off more than (he) can chew\" at times. Goals of Care / Plan: Transition to comfort measures but wait for son to return before removing bipap (son spent the night at bedside last night, plans to go home to refresh but will return this afternoon). Dr. Henry Peterson relayed above to Dr. Trina Mcmanus and RN. SW will follow for support. Music Therapy was offered but son declined, did not feel pt would enjoy music at this time. Thank you for including Palliative Medicine in the care of Ms. Blancas.     Bonifacio Martinez LCSW, [facility]  [phone] ([phone])

## 2017-06-29 NOTE — PROGRESS NOTES
SW made follow up visit with Palliative Medicine Physician Dr. Jen Diez after learning that son had returned to the room, is prepared to proceed with transition from bipap to nasal canula. Dr. Jen Diez reviewed process with son and cousin in room; son expressed comfort with plan. Son aware of possibility that death might be imminent. Hospice referral to be considered if pt survives the night. RN aware of above. SW will continue to follow for support.

## 2017-06-29 NOTE — PROGRESS NOTES
Shift Summary    0750:  Patient resting in the bed. She is responsive to voice. No complaints voiced at this time. No signs of distress noted at this time. 6613:  Patient in the bed moaning. Unable to verbalize pain/SOB. IV morphine given as ordered PRN. 0930:  Patient resting more comfortably after IV morphine. 1100: Care of patient transferred to [facility]. 1230:  Resumed are of patient from [facility]. 1400:  Patient moaning and grimacing. IV morphine given as ordered PRN. 1510:  IV morphine 2mg, IV ativan 1mg, and IV robinul 0.2mg given as ordered prior to removed bipap. 1545:  Bipap removed and patient placed on NC 6L. Family at the bedside. Patient resting comfortably. 1600:  Patient is resting comfortably with family at the bedside. 1800:  Patient continues to rest comfortably with no signs of distress noted. Family educated on signs/symptoms of pain and oxygen hunger and asked to ring for nursing if they think she needs something for comfort.

## 2017-06-29 NOTE — PROGRESS NOTES
Physical Therapy:  Acknowledged discontinue orders. We will complete this order. Please re-consult if anything changes.   Thank you  Arnold Flynn PT,DPT,NCS

## 2017-06-29 NOTE — ACP (ADVANCE CARE PLANNING)
Dear Mr. Jerry Egan  team was consulted as part of your loved one's care in the hospital. Our team is a supportive service; we strive to relieve suffering and improve quality of life. Today you met with Dr. Ten Guerrero, Nurse Practitioner Fellow Matt Noonan, [facility], and  [facility] . We reviewed advance care planning information, which includes the following:  Advance Care Planning 6/29/2017   Patient's Healthcare Decision Maker is: Legal Next of Kin   Primary Decision Maker Name Maddy Yo   Primary Decision Maker Phone Number   cell [phone] Relationship to Patient Adult child   Confirm Advance Directive -   Patient Would Like to Complete Advance Directive -   Does the patient have other document types -         You identified the following goals / treatment preferences for healthcare: You shared that Mom has been hospitalized multiple times over the past months and has become increasingly weaker. We discussed her fragile state and the option of transitioning the focus of care to Mom's comfort, including replacing the bipap mask with oxygen delivered in a gentler fashion. We discussed how her life expectancy might be very limited, but our goal is to manage any symptoms to ensure she is not in distress. You shared that Mom previously enjoyed gardening and seeing the smiles that her cooking brought to loved ones' faces. It sounds like taking care of others was important to her, even in the midst of confusion. Our hope is to care for her as well as she has cared for others! Because of the importance of this information, we are providing you with a printed copy to share with other healthcare providers after this hospitalization is complete. If any of the above information is incomplete or incorrect, please contact the Palliative Medicine team at [phone].     Ten Guerrero, MD [employer], [facility]

## 2017-06-29 NOTE — PROGRESS NOTES
Marky Hartley [facility] [place] [facility]  [address] [facility], [address] [place] [address]  [phone]      Medical Progress Note      NAME:         Hola Salgado   :        3/2/1924  MRM:        [id number]    Date:          2017      Subjective: Patient has been seen and examined as a follow up for suspected septic shock. Chart, labs, diagnostics reviewed. She is very ill and not able to give much Hx. I have discussed with her nurse as well as family for collaborative hx. Objective:    Vital Signs:    Visit Vitals    /60    Pulse 93    Temp 97.9 °F (36.6 °C)    Resp 15    Ht 4' 11\" (1.499 m)    Wt 48 kg (105 lb 13.1 oz)    SpO2 (!) 74%    Breastfeeding No    BMI 21.37 kg/m2        Intake/Output Summary (Last 24 hours) at 17 1121  Last data filed at 17 0503   Gross per 24 hour   Intake            529.2 ml   Output               70 ml   Net            459.2 ml        Physical Examination:    General:   Weak, cachectic looking elderly female on BiPAP   Eyes:   pink conjunctivae, PERRLA with no discharge. ENT:   no ottorrhea or rhinorrhea with dry mucous membranes  Neck: no masses, thyroid non-tender and trachea central.  Pulm:  no accessory muscle use, generally decreased breath sounds without crackles or wheezes  Card:  no JVD or murmurs, has regular and normal S1, S2 without thrills, bruits or peripheral edema  Abd:  Soft, non-tender, non-distended, normoactive bowel sounds   Musc:  No cyanosis, clubbing, atrophy or deformities. Skin:  No rashes, bruising or ulcers. Neuro: Somnolent, opens eyes.  Very weak for any active exam.   Psych:  Has no insight to illness     Current Facility-Administered Medications   Medication Dose Route Frequency    haloperidol (HALDOL) 2 mg/mL oral solution 2 mg  2 mg SubLINGual Q6H PRN    Or    haloperidol lactate (HALDOL) injection 2 mg  2 mg IntraVENous Q6H PRN    prochlorperazine (COMPAZINE) injection 10 mg  10 mg IntraVENous Q4H PRN    LORazepam (ATIVAN) injection 1 mg  1 mg IntraVENous Q15MIN PRN    ketorolac (TORADOL) injection 15 mg  15 mg IntraVENous Q8H PRN    scopolamine (TRANSDERM-SCOP) 1.5 mg  1.5 mg TransDERmal Q72H PRN    glycopyrrolate (ROBINUL) injection 0.2 mg  0.2 mg IntraVENous Q4H PRN    fentaNYL citrate (PF) injection 25 mcg  25 mcg IntraVENous Q15MIN PRN    sodium chloride (NS) flush 5-10 mL  5-10 mL IntraVENous Q8H    sodium chloride (NS) flush 5-10 mL  5-10 mL IntraVENous PRN    acetaminophen (TYLENOL) tablet 650 mg  650 mg Oral Q4H PRN    naloxone (NARCAN) injection 0.4 mg  0.4 mg IntraVENous PRN    ondansetron (ZOFRAN) injection 4 mg  4 mg IntraVENous Q4H PRN    glucose chewable tablet 16 g  4 Tab Oral PRN    dextrose (D50W) injection syrg 12.5-25 g  12.5-25 g IntraVENous PRN    glucagon (GLUCAGEN) injection 1 mg  1 mg IntraMUSCular PRN    0.9% sodium chloride infusion 250 mL  250 mL IntraVENous PRN    morphine injection 1 mg  1 mg IntraVENous Q2H PRN        Laboratory data and review:    Recent Labs      06/29/17   0330  06/28/17   1639   WBC  6.2  5.5   HGB  9.1*  6.5*   HCT  27.9*  20.9*   PLT  29*  23*     Recent Labs      06/29/17   0258  06/28/17   1527   NA  133*  127*   K  4.6  4.3   CL  96*  91*   CO2  20*  19*   GLU  83  223*   BUN  60*  61*   CREA  3.07*  3.10*   CA  8.5  9.2   MG  2.6*   --    ALB  3.2*  3.5   SGOT  270*  146*   ALT  237*  144*   INR   --   1.8*     No components found for: [place]    Assessment and Plan:    Principal Problem:    Septic shock (HCC) (6/28/2017)    Acute respiratory failure with hypoxia    CAD (coronary artery disease), native coronary artery (2/4/2011)    AF (atrial fibrillation) (HCC) (2/4/2011)    Elevated troponin (3/4/2017)    Anemia (3/4/2017)    ARF (acute renal failure) ([facility]) (3/4/2017)    CKD (chronic kidney disease) stage 4, GFR 15-29 ml/min ([facility]) (6/75/9410)    Diastolic CHF, chronic ([facility] (5/29/2017)    Thrombocytopenia ([facility] (5/31/2017)    Hypothermia (6/28/2017)    Hyponatremia (6/28/2017)    Transaminitis (6/28/2017)   - patient with multiorgan failure   - cultures have remained unreamarkable but she clinically remains critical   - seen by palliative care and family elected for comfort care measures only   - continue BiPAP for comfort awaiting son's return   - no more lab draws    Total time spent for the patient's care: [facility] discussed with: Family, Nursing Staff and Consultant/Specialist    Discussed:  Care Plan and D/C Planning    Prophylaxis:  not indicated    Anticipated Disposition:  inpatient hospice vs TBD           ___________________________________________________    Attending Physician:   Malik Vanessa MD

## 2017-06-29 NOTE — PROGRESS NOTES
I met briefly with pt.'s son in the hallway in a supportive way. I will meet again with pt and son once echo is completed.   Wilene Kayser

## 2017-06-29 NOTE — PROGRESS NOTES
1045- Bedside shift change report given to [facility] (oncoming nurse) by Hilarie Litten RN (offgoing nurse). Report included the following information SBAR, Kardex, Intake/Output, MAR and Recent Results. 1125- Pt resting comfortably on bipap. Family at bedside. Plan for transition to comfort care. Will take patient off bipap after son returns. 1230- Bedside shift change report given to Hilarie Litten RN (oncoming nurse) by Arden Rosenthal RN (offgoing nurse). Report included the following information SBAR, Kardex, Intake/Output, MAR and Recent Results.

## 2017-06-29 NOTE — CONSULTS
Palliative Medicine Consult  Tomás: [phone] ([phone])    Patient Name: Suleman Espinoza  YOB: 1924    Date of Initial Consult: 6/29/17  Reason for Consult: Care decisions  Requesting Provider: Dr. Rafy Cuba   Primary Care Physician: Irving Luke MD      SUMMARY:   Suleman Espinoza is a 80 y.o. with a past history of CAD, afib., diastolic CHF, anemia , who was admitted on 6/28/2017 from home with a diagnosis of multiorgan failure with shock(sepsis vs cardiogenic) Current medical issues leading to Palliative Medicine involvement include: Care decisions. Chart reviewed/HPI-Patient currently on BIPAP so difficult to participate. Per son, she has had significant decline over the last several months with 5 admissions to different hospitals in the last 4-6 months. She has never fully recovered and typically with problems involving UTI. Patient admitted on 6/28 with multiorgan failure-renal failure, respiratory failure and probable sepsis. Oxygen sats have been in the 60-70's most of the night despite maximum BIPAP         PALLIATIVE DIAGNOSES:   1. GOC discussion  2. SOB secondary to respiratory failure with b/l pleural effusions  3. Debility  4. CHF-echo with EF of 40% and severe hypokinesis  5. Acute renal failure       PLAN:   1. Harleen(LCSW), Ricardo(), Sarah(NP, fellow) met with patient who could not participate in conversation as she is weak, confused and on BIPAP. Spent time with her son reviewing the medical situation. He seems to have a good grasp of the gravity of the situation and understands she is in critical situation. Also explained our concern that she does not seem to have made progress over night. 2. GOC-reviewed different options at this time. He is clear that he does not want to see his mom in pain or suffer. Discussed continuing current therapy vs transition to comfort measures.  Explained the difference between to the two options with comfort measures focusing on her symptoms and less focus on further testing. He would like to make the transition to comfort measures but would like to continue BIPAP until he returns from checking on his wife who has advanced MS. 3. AMD-son is MPOA by default. No other children  4. Code status- clear on no resuscitative efforts to include intubation, CPR, defibrillation. Does not want pressor support or central line. EMR reflects DNR status  5. Symptom management-comfort order set placed so that nursing staff has meds available for pain, SOB, agitation, secretions. Will premedicate and make sure she is comfortable before removing BIPAP  6. Psychosocial-Greyson has care for his mother in his home for the last 8 years. He did have a brother pass away at age 2(prior to his birth) but he had no children. His wife also is debilitated with MS. He has caregivers helping with her. He is involved with his [employer] and [employer]. His mom loved gardening and cooking  7. Initial consult note routed to primary continuity provider  8.  Communicated plan of care with: Palliative IDT, Mary Lozano(bedside nurse)       GOALS OF CARE / TREATMENT PREFERENCES:   [====Goals of Care====]  GOALS OF CARE:  Patient / health care proxy stated goals: make sure my mom is comfortable      TREATMENT PREFERENCES:   Code Status: DNR    Advance Care Planning:  Advance Care Planning 6/29/2017   Patient's Healthcare Decision Maker is: Legal Next of Jamar 69   Primary Decision Maker Name Leonora Lomas   Primary Decision Maker Phone Number   cell    Primary Decision Maker Relationship to Patient Adult child   Confirm Advance Directive -   Patient Would Like to Complete Advance Directive -   Does the patient have other document types -       Other:    The palliative care team has discussed with patient / health care proxy about goals of care / treatment preferences for patient.  [====Goals of Care====]         HISTORY:     History obtained from: chart, bedside nurse, son    CHIEF COMPLAINT: sob    HPI/SUBJECTIVE:    The patient is:   [x] Verbal and participatory  [] Non-participatory due to:   Patient very weak with BIPAP in place.  Difficult to understand as she tries to whisper     Clinical Pain Assessment (nonverbal scale for severity on nonverbal patients):   [++++ Clinical Pain Assessment++++]  [++++Pain Severity++++]: Pain: 3  [++++Pain Character++++]:   [++++Pain Duration++++]:   [++++Pain Effect++++]:   [++++Pain Factors++++]:   [++++Pain Frequency++++]:   [++++Pain Location++++]:   [++++ Clinical Pain Assessment++++]    Adult Non-Verbal Pain Assessment    Face  [] 0   No particular expression or smile  [x] 1   Occasional grimace, tearing, frowning, wrinkled forehead  [] 2   Frequent grimace, tearing, frowning, wrinkled forehead    Activity (movement)  [] 0   Lying quietly, normal position  [x] 1   Seeking attention through movement or slow, cautious movement  [] 2   Restless, excessive activity and/or withdrawal reflexes    Guarding  [x] 0   Lying quietly, no positioning of hands over areas of body  [] 1   Splinting areas of the body, tense  [] 2   Rigid, stiff    Physiology (vital signs)  [x] 0   Stable vital signs  [] 1   Change in any of the following: SBP > 20mm Hg; HR > 20/minute  [] 2   Change in any of the following: SBP > 30mm Hg; HR > 25/minute    Respiratory  [] 0   Baseline RR/SpO2, compliant with ventilator  [x] 1   RR > 10 above baseline, or 5% drop SpO2, mild asynchrony with ventilator  [] 2   RR > 20 above baseline, or 10% drop SpO2, asynchrony with ventilator    Total Non-Verbal Pain Score: 3/10     FUNCTIONAL ASSESSMENT:     Palliative Performance Scale (PPS):  PPS: 20       PSYCHOSOCIAL/SPIRITUAL SCREENING:     Advance Care Planning:  Advance Care Planning 6/29/2017   Patient's Healthcare Decision Maker is: Legal Next of Jamar Wharton   Primary Decision Maker Name Fina Elise   Primary Decision Maker Phone Number [phone]  cell [phone] Primary Decision Maker Relationship to Patient Adult child   Confirm Advance Directive -   Patient Would Like to Complete Advance Directive -   Does the patient have other document types -        Any spiritual / Anglican concerns:  [] Yes /  [x] No    Caregiver Burnout:  [] Yes /  [x] No /  [] No Caregiver Present      Anticipatory grief assessment:   [x] Normal  / [] Maladaptive       ESAS Anxiety: Anxiety: 2    ESAS Depression: Depression: 0        REVIEW OF SYSTEMS:     Positive and pertinent negative findings in ROS are noted above in HPI. The following systems were [x] reviewed / [] unable to be reviewed as noted in HPI  Other findings are noted below. Systems: constitutional, ears/nose/mouth/throat, respiratory, gastrointestinal, genitourinary, musculoskeletal, integumentary, neurologic, psychiatric, endocrine. Positive findings noted below. Modified ESAS Completed by: provider   Fatigue: 5 Drowsiness: 4   Depression: 0 Pain: 3   Anxiety: 2 Nausea: 0   Anorexia: 0 Dyspnea: 6     Constipation: No              PHYSICAL EXAM:     From RN flowsheet:  Wt Readings from Last 3 Encounters:   06/29/17 105 lb 13.1 oz (48 kg)   05/31/17 105 lb (47.6 kg)   03/06/17 101 lb 1.6 oz (45.9 kg)     Blood pressure (!) 85/59, pulse 91, temperature 97.9 °F (36.6 °C), resp. rate 12, height 4' 11\" (1.499 m), weight 105 lb 13.1 oz (48 kg), SpO2 93 %, not currently breastfeeding.     Pain Scale 1: Adult Nonverbal Pain Scale  Pain Intensity 1: 0  Pain Onset 1: two weeks  Pain Location 1: Perineum  Pain Orientation 1: Lower  Pain Description 1: Burning, Constant  Pain Intervention(s) 1: MD notified (comment)  Last bowel movement, if known:     Constitutional: thin, ill appearing, BIPAP in place, will open eyes to voice  Eyes: pupils equal, anicteric  ENMT: no nasal discharge, dry mucous membranes, BIPAP in place  Cardiovascular: regular rhythm, distal pulses intact  Respiratory: breathing moderately labored, symmetric, decreased at bases  Gastrointestinal: soft non-tender, +bowel sounds  Musculoskeletal: no deformity, no tenderness to palpation  Skin: warm, dry  Neurologic: following some commands, moving all extremities         HISTORY:     Principal Problem:    Septic shock ([facility] (6/28/2017)    Active Problems:    CAD (coronary artery disease), native coronary artery (2/4/2011)      AF (atrial fibrillation) ([facility]) (2/4/2011)      Elevated troponin (3/4/2017)      Anemia (3/4/2017)      ARF (acute renal failure) ([facility] (3/4/2017)      CKD (chronic kidney disease) stage 4, GFR 15-29 ml/min ([facility]) (5/21/2755)      Diastolic CHF, chronic ([facility]) (5/29/2017)      Thrombocytopenia ([facility] (5/31/2017)      Hypothermia (6/28/2017)      Hyponatremia (6/28/2017)      Transaminitis (6/28/2017)      Past Medical History:   Diagnosis Date    A-fib ([facility]     Acid reflux     Anemia     CAD (coronary artery disease)     Heart failure ([facility])     High cholesterol     Hypertension     UTI (urinary tract infection)       Past Surgical History:   Procedure Laterality Date    HX APPENDECTOMY      HX HYSTERECTOMY      HX PACEMAKER      HX PACEMAKER PLACEMENT      HX TONSILLECTOMY        Family History   Problem Relation Age of Onset    Family history unknown: Yes      History reviewed, no pertinent family history.   Social History   Substance Use Topics    Smoking status: Never Smoker    Smokeless tobacco: Never Used    Alcohol use No     No Known Allergies   Current Facility-Administered Medications   Medication Dose Route Frequency    haloperidol (HALDOL) 2 mg/mL oral solution 2 mg  2 mg SubLINGual Q6H PRN    Or    haloperidol lactate (HALDOL) injection 2 mg  2 mg IntraVENous Q6H PRN    prochlorperazine (COMPAZINE) injection 10 mg  10 mg IntraVENous Q4H PRN    LORazepam (ATIVAN) injection 1 mg  1 mg IntraVENous Q15MIN PRN    ketorolac (TORADOL) injection 15 mg  15 mg IntraVENous Q8H PRN    scopolamine (TRANSDERM-SCOP) 1.5 mg  1.5 mg TransDERmal Q72H PRN    glycopyrrolate (ROBINUL) injection 0.2 mg  0.2 mg IntraVENous Q4H PRN    fentaNYL citrate (PF) injection 25 mcg  25 mcg IntraVENous Q15MIN PRN    sodium chloride (NS) flush 5-10 mL  5-10 mL IntraVENous Q8H    sodium chloride (NS) flush 5-10 mL  5-10 mL IntraVENous PRN    acetaminophen (TYLENOL) tablet 650 mg  650 mg Oral Q4H PRN    naloxone (NARCAN) injection 0.4 mg  0.4 mg IntraVENous PRN    ondansetron (ZOFRAN) injection 4 mg  4 mg IntraVENous Q4H PRN    glucose chewable tablet 16 g  4 Tab Oral PRN    dextrose (D50W) injection syrg 12.5-25 g  12.5-25 g IntraVENous PRN    glucagon (GLUCAGEN) injection 1 mg  1 mg IntraMUSCular PRN    0.9% sodium chloride infusion 250 mL  250 mL IntraVENous PRN    morphine injection 1 mg  1 mg IntraVENous Q2H PRN          LAB AND IMAGING FINDINGS:     Lab Results   Component Value Date/Time    WBC 6.2 06/29/2017 03:30 AM    HGB 9.1 06/29/2017 03:30 AM    PLATELET 29 02/60/4671 03:30 AM     Lab Results   Component Value Date/Time    Sodium 133 06/29/2017 02:58 AM    Potassium 4.6 06/29/2017 02:58 AM    Chloride 96 06/29/2017 02:58 AM    CO2 20 06/29/2017 02:58 AM    BUN 60 06/29/2017 02:58 AM    Creatinine 3.07 06/29/2017 02:58 AM    Calcium 8.5 06/29/2017 02:58 AM    Magnesium 2.6 06/29/2017 02:58 AM    Phosphorus 4.5 05/30/2017 05:43 AM      Lab Results   Component Value Date/Time    AST (SGOT) 270 06/29/2017 02:58 AM    Alk.  phosphatase 234 06/29/2017 02:58 AM    Protein, total 6.2 06/29/2017 02:58 AM    Albumin 3.2 06/29/2017 02:58 AM    Globulin 3.0 06/29/2017 02:58 AM     Lab Results   Component Value Date/Time    INR 1.8 06/28/2017 03:27 PM    Prothrombin time 17.8 06/28/2017 03:27 PM    aPTT 37.4 06/28/2017 03:27 PM      Lab Results   Component Value Date/Time    Iron 181 05/29/2017 08:40 PM    TIBC 368 05/29/2017 08:40 PM    Iron % saturation 49 05/29/2017 08:40 PM    Ferritin 130 05/29/2017 08:40 PM      No results found for: PH, PCO2, PO2  No components found for: [place]   Lab Results   Component Value Date/Time    CK 28 05/30/2017 05:43 AM    CK - MB 3.2 05/30/2017 05:43 AM                Total time: 70  Counseling / coordination time, spent as noted above: 60  > 50% counseling / coordination?: yes    Prolonged service was provided for  []30 min   []75 min in face to face time in the presence of the patient, spent as noted above. Time Start:   Time End:   Note: this can only be billed with 55004 (initial) or 37766 (follow up). If multiple start / stop times, list each separately.

## 2017-06-29 NOTE — CDMP QUERY
1. Please clarify if this patient is being treated/managed for:    =>Acute Hypoxic Respiratory Failure in the setting of Septic shock; requiring High flow O2/Bipap  =>Other Explanation of clinical findings  =>Unable to Determine (no explanation of clinical findings)    The medical record reflects the following clinical findings, treatment, and risk factors:    Risk Factors: Septic shock; probable UTI    Clinical Indicators: ED notes: 90% NRB, placed on Bipap  RR 20-25 sats 60-70s; CXR => Right pleural effusion and associated atelectasis has worsened. Pulmonary fluid overload has worsened mildly. Treatment: Monitor VS, sats, ICU bed; bipap; High flow      Please clarify and document your clinical opinion in the progress notes and discharge summary including the definitive and/or presumptive diagnosis, (suspected or probable), related to the above clinical findings. Please include clinical findings supporting your diagnosis.     Thank you,  Josette Bowling, MSN, [place] [phone]

## 2017-06-29 NOTE — PROGRESS NOTES
2000 - Patient arrived to floor. Dr. Martinez Muñiz notified. BP 96/58. Currently on 100% NRB. Will continue to monitor. 2010 - Patient stating does not want central line placed and does not want medication for blood pressure. Dr. Martinez Muñiz at bedside, aware. 2110 - 1 unit PRBCs initiated. Will remain with patient for 15 minutes. 2125 - Transfusion rate increased to 100mL/hr. Will continue to monitor. 2155 - Lactic acid level 5.8. Patient oxygen saturation dropping to 60s-70s. Dr. Edgar Wilson notified. Orders received for BiPap if ok with son and patient. Patient and son agreed to using BiPap machine. Respiratory notified. 2200 - Patient placed on BiPap at 100%. Patient oxygen saturation sustaining 60%    2205 - Oxygen saturation at 59% and sustaining. Dr. Edgar Wilson notified. No orders received. 2210 - At son's request,  notified. Asked son about patient's scheduled medication. Son stated at this time \"no need for any other medication\". 65 - Dr. Edgar Wilson at bedside. 2240 -  at bedside    2335 - Patient arousable. Complaints of abd pain. Notified Dr. Edgar Wilson. Orders received for 1mg IV morphine Q2H. Will continue to monitor. Patient son asked nurse to not check patient blood glucose at this time. 80 - Patient's son requesting the other unit of blood be administered to patient. Patient responds to pain only at this time. Oxygen sats 61%. 0030 - 2nd unit of PRBCs started. Will remain with patient for first 15 minutes. 0310 - Urine output 50mL/shift. Patient bladder scanned with amount showing 19mL. 2nd unit of PRBCs complete. Will continue to monitor    0620 - Patient blood glucose 45. Treated with D50. See progress note. 2678 - Notified Dr. Edgar Wilson in regards to lactic acid 4.2, platelets 29, and hypoglycemic episode. Orders received to change patients fluids. Bedside and Verbal shift change report given to Alexandre German RN (oncoming nurse) by Calvin Hickey RN (offgoing nurse). Report included the following information SBAR, Kardex, ED Summary, Intake/Output, MAR, Recent Results, Med Rec Status and Cardiac Rhythm Afib.      Primary Nurse Harry Whitaker RN and Geetha Gamez RN performed a dual skin assessment on this patient Impairment noted- see wound doc flow sheet  Tolu score is 14

## 2017-06-29 NOTE — PROGRESS NOTES
[facility]: MED RECONCILIATION    Comments/Recommendations:   · Verified allergies as documented: NKA  · Med rec completed with family using medication list.  · He reported that medications are filled using [facility] and [place]. · Family reports, for her UTI, she was started on levofloxacin which was then changed due to mental confusion to amoxicillin 500mg PO TID x 10 days on 6/21. Cultures came back resistant to amoxicillin, which was then changed to nitrofurantoin as documented on 6/26. Medications added:     · All OTC medications    Medications removed:    · Ambien    Medications adjusted:    · Doxepin changed to additional 25mg nightly PRN instead of 50mg    Information obtained from: Family, Rx Query, EMR    Allergies: Review of patient's allergies indicates no known allergies. Prior to Admission Medications:   Prior to Admission Medications   Prescriptions Last Dose Informant Patient Reported? Taking? CRANBERRY FRUIT EXTRACT (CRANBERRY PO) 6/28/2017 at am Family Member Yes Yes   Sig: Take 4,200 mg by mouth daily. acetaminophen (TYLENOL EXTRA STRENGTH) 500 mg tablet 6/27/2017 at hs Family Member Yes Yes   Sig: Take 1,000 mg by mouth nightly. calcium citrate-vitamin D3 (CITRACAL WITH VITAMIN D MAXIMUM) tablet 6/28/2017 at am Family Member Yes Yes   Sig: Take 2 Tabs by mouth daily. carboxymethylcellulose sodium (REFRESH CELLUVISC) 1 % ophthalmic solution 6/21/2017 at Unknown time Family Member Yes Yes   Sig: Apply 1 Drop to eye as needed. Indications: DRY EYE   ceramides 1,3,6-11 ([place]ron Novel) lotn 6/28/2017 at Unknown time Family Member Yes Yes   Sig: by Apply Externally route daily as needed (dry skin). cholecalciferol (VITAMIN D3) 1,000 unit tablet 6/28/2017 at am Family Member Yes Yes   Sig: Take 1,000 Units by mouth daily. cyanocobalamin (VITAMIN B-12) 500 mcg tablet 6/28/2017 at am Family Member Yes Yes   Sig: Take 500 mcg by mouth daily.    dilTIAZem CD (CARDIZEM CD) 120 mg ER capsule 6/27/2017 at pm Family Member Yes Yes   Sig: Take 120 mg by mouth every evening. docusate sodium (COLACE) 100 mg capsule 6/27/2017 at hs Family Member Yes Yes   Sig: Take 200 mg by mouth nightly as needed. doxepin (SINEQUAN) 25 mg capsule 6/27/2017 at hs Family Member Yes Yes   Sig: Take 25 mg by mouth nightly. doxepin (SINEQUAN) 25 mg capsule 6/21/2017 Family Member Yes Yes   Sig: Take 25 mg by mouth daily as needed. In addition to 25mg nightly   esomeprazole (NEXIUM) 40 mg capsule 6/28/2017 at am Family Member Yes Yes   Sig: Take 40 mg by mouth Daily (before breakfast). furosemide (LASIX) 40 mg tablet 6/28/2017 at am Family Member Yes Yes   Sig: Take 40 mg by mouth daily. magnesium oxide (MAG-OX) 400 mg tablet 6/28/2017 at am Family Member Yes Yes   Sig: Take 400 mg by mouth daily. melatonin tab tablet 6/27/2017 at hs Family Member Yes Yes   Sig: Take 5 mg by mouth nightly. mesalamine (DELZICOL) 400 mg cdti capsule Couple weeks ago Family Member Yes No   Sig: Take 400 mg by mouth three (3) times daily as needed (loose stools). methenamine hippurate (HIPREX) 1 gram tablet 6/28/2017 at am Family Member Yes Yes   Sig: Take 1 g by mouth two (2) times daily (with meals). nitrofurantoin (MACRODANTIN) 50 mg capsule 6/28/2017 at am Family Member Yes Yes   Sig: Take 50 mg by mouth two (2) times a day. For 10 days   polyethylene glycol (MIRALAX) 17 gram packet 6/26/2017 Family Member Yes Yes   Sig: Take 17 g by mouth every Monday, Wednesday, Friday. potassium 99 mg tablet 6/28/2017 at am Family Member Yes Yes   Sig: Take 99 mg by mouth daily. simvastatin (ZOCOR) 10 mg tablet 6/27/2017 at hs Family Member Yes Yes   Sig: Take 10 mg by mouth nightly. spironolactone (ALDACTONE) 25 mg tablet 6/26/2017 Family Member Yes Yes   Sig: Take 25 mg by mouth daily as needed.       Facility-Administered Medications: None       Thank you,  Kayy Malone, PharmD     Contact: [phone]

## 2017-06-30 NOTE — PROGRESS NOTES
Bedside shift change report given to Huang WALTER'Go Naranjo RN (oncoming nurse) by Loral Gilford (offgoing nurse). Report included the following information SBAR, Kardex and ED Summary. 1900:   Report received, pt in bed, family at bedside, pt is on comfort care measures    2000:  Pt is appears comfortably, family at bedside, update family on patient current condtion    0000:   pt resting comfortably    0400:  Notified family member of pt decline in VS    0430:  Pt immediate family at bedside, notified them of pt current condition and VS    0445:  Pt asystole, RR 0, Dr. Resendez Promise to bedside to pronounce    0500:  Notified [facility] of pt death, they signed off saying she was not a candidate for donation, nursing supervisor notified of pt death. Lisa notified    0530:   Family left bedside, pt has no belongings,     0600:  Post-mortem care complete and code black called    0700:  June Azevedo of [facility] notified of pt death            Bedside shift change report given to Loral Gilford (oncoming nurse) by Huang WALTER'Go Naranjo RN (offgoing nurse). Report included the following information SBAR, Kardex and ED Summary.

## 2017-06-30 NOTE — DISCHARGE SUMMARY
Physician Discharge Summary     Patient ID:  Rubén Swenson  [id number]  89 y.o.  3/2/1924    Admit date: 2017    Discharge date and time: 2017    Admission Diagnoses: Hypothermia    Discharge Diagnoses:    Principal Diagnosis   Septic shock ([facility]                                             Other Diagnoses    CAD (coronary artery disease), native coronary artery (2011)    AF (atrial fibrillation) ([facility] (2011)    Elevated troponin (3/4/2017)    Anemia (3/4/2017)    ARF (acute renal failure) ([facility] (3/4/2017)    CKD (chronic kidney disease) stage 4, GFR 15-29 ml/min ([facility]) ()    Diastolic CHF, chronic ([facility] (2017)    Thrombocytopenia ([facility] (2017)    Hypothermia (2017)    Hyponatremia (2017)    Transaminitis (2017)    Hospital Course:   Septic shock / Hypothermia - unclear source. ?intra-ab source. Due to grave condition, family opted for comfort measures after admission. Patient passed due to presumed bacterial sepsis.     CAD (coronary artery disease), native coronary artery / Diastolic CHF, chronic/ Elevated troponin - Troponin unlikely AMI, more likely strain.      AF (atrial fibrillation) - Held dilt due to hypotension, tachycardic due to sepsis. Anemia / Thrombocytopenia - POA ? GI bleed.  Transfuses 2u PRBC's initially     ARF (acute renal failure) / CKD (chronic kidney disease) stage 4 /Hyponatremia - ARF POA due to hypotension, shock.       PCP: Jorge Blevins MD    Consults: Pulmonary/Intensive care and Hematology/Oncology    Significant Diagnostic Studies: See Hospital Course    See death pronouncement, patient  at 4:45 AM    Signed:  Juanita Us MD  2017  4:56 AM

## 2017-06-30 NOTE — PROGRESS NOTES
Visited in response to notification of pt's death in [facility]. Pt's family left soon after death and immediately prior to 's arrival. Bedside prayer for pt who was a person of [address] [facility],[address], as per her son Donna Goode. Consulted pt's chart and Nurse Shelbie.    Chaplain Thompson MDiv, MS, [facility]  [phone] PRAY [phone])

## 2017-06-30 NOTE — PROGRESS NOTES
Called to bedside by nursing to pronounce death. Patient unresponsive, apneic, pulseless, without breath or heart sounds, pupils fixed.   Death pronounced at 4:45 AM

## 2017-07-04 LAB
BACTERIA SPEC CULT: NORMAL
SERVICE CMNT-IMP: NORMAL

## 2021-04-06 NOTE — PROGRESS NOTES
Pt presents with Acute respiratory failure ([facility]   Days in  LOS at this time is 2    Problem List  Date Reviewed: 3/4/2017          Codes Class Noted    * (Principal)Acute pulmonary edema ([facility] ICD-10-CM: J81.0  ICD-9-CM: 518.4  3/4/2017        Troponin level elevated ICD-10-CM: R79.89  ICD-9-CM: 790.6  3/4/2017        Anemia ICD-10-CM: D64.9  ICD-9-CM: 285.9  3/4/2017        ARF (acute renal failure) ([facility]) ICD-10-CM: N17.9  ICD-9-CM: 584.9  3/4/2017        Acute respiratory failure ([facility]) ICD-10-CM: J96.00  ICD-9-CM: 518.81  3/4/2017        CAD (coronary artery disease), native coronary artery ICD-10-CM: I25.10  ICD-9-CM: 414.01  2/4/2011        AF (atrial fibrillation) ([facility]) ICD-10-CM: I48.91  ICD-9-CM: 427.31  2/4/2011        Arrhythmia sinus bradycardia ICD-10-CM: I49.5  ICD-9-CM: 427.81  2/4/2011              Principal Problem:    Acute pulmonary edema ([facility] (3/4/2017)    Active Problems:    CAD (coronary artery disease), native coronary artery (2/4/2011)      AF (atrial fibrillation) ([facility] (2/4/2011)      Troponin level elevated (3/4/2017)      Anemia (3/4/2017)      ARF (acute renal failure) ([facility] (3/4/2017)      Acute respiratory failure ([facility]) (3/4/2017)        Pt cardiac rhythm at this time is paced. Last documented Troponin   Recent Labs      03/05/17   0052   TROIQ  0.32*       Pt last three weights    Last 3 Recorded Weights in this Encounter    03/04/17 1304 03/05/17 0651 03/06/17 0855   Weight: 47.6 kg (105 lb) 46.4 kg (102 lb 6 oz) 45.9 kg (101 lb 1.6 oz)    last weighed via   lost,  1 lbs. .     Pt has the following consults Consult Physical Therapy for conditioning and discharge planning    Pt is currently taking   Current Facility-Administered Medications   Medication Dose Route Frequency    senna-docusate (PERICOLACE) 8.6-50 mg per tablet 1 Tab  1 Tab Oral DAILY    iron sucrose (VENOFER) 100 mg in 0.9% sodium chloride 100 mL IVPB  100 mg IntraVENous Q24H    docusate sodium (COLACE) capsule 200 mg  200 mg Oral QHS    pantoprazole (PROTONIX) tablet 40 mg  40 mg Oral ACB    mesalamine (DELZICOL) capsule (DR tablets inside) 400 mg  400 mg Oral TID PRN    metoprolol succinate (TOPROL-XL) XL tablet 50 mg  50 mg Oral DAILY    simvastatin (ZOCOR) tablet 10 mg  10 mg Oral QHS    solifenacin (VESICARE) tablet 5 mg  5 mg Oral QHS    artificial tears (dextran 70-hypromellose) (NATURAL BALANCE) 0.1-0.3 % ophthalmic solution 1 Drop  1 Drop Both Eyes PRN    sodium chloride (NS) flush 5-10 mL  5-10 mL IntraVENous Q8H    sodium chloride (NS) flush 5-10 mL  5-10 mL IntraVENous PRN    acetaminophen (TYLENOL) tablet 650 mg  650 mg Oral Q4H PRN    naloxone (NARCAN) injection 0.4 mg  0.4 mg IntraVENous PRN    ondansetron (ZOFRAN) injection 4 mg  4 mg IntraVENous Q4H PRN    doxycycline (VIBRA-TABS) tablet 100 mg  100 mg Oral BID WITH MEALS    furosemide (LASIX) injection 40 mg  40 mg IntraVENous BID    dilTIAZem CD (CARDIZEM CD) capsule 240 mg  240 mg Oral QHS    doxepin (SINEquan) capsule 25 mg  25 mg Oral QHS       Meds held in the past 12 HOURS hours are: none  Due to: no change    UPDATE INTAKE AND OUTPUT    Intake/Output Summary (Last 24 hours) at 03/06/17 1258  Last data filed at 03/06/17 1020   Gross per 24 hour   Intake              420 ml   Output              750 ml   Net             -330 ml       12 HOURS CHART SIGN OFF DONE BY Donnie Abbasi RN        Patient VERBALexpected daily goal for today is: 1. Patient would like to resume physical therapy at home. 2.Patient felt ok with going home. Nurse goal for patient today:  Draw am labs, f/u with PT 6 min walk and Case management for PT at home. [place] Border Text: The closure involved the vermilion border.

## 2022-01-24 NOTE — NURSE NAVIGATOR
Chart reviewed by Heart Failure Nurse Navigator. Heart Failure database completed. Final Echo report is pending. ACEi/ARB: not indicated. BB: metoprolol succinate 50 mg daily. CRT not indicated. NYHA Functional Class II symptoms with primary c/o of weakness, difficulty ambulating, and mild SOB. Heart Failure Teach Back in Patient Education. Heart Failure Avoiding Triggers on Discharge Instructions.       Usual cardiologist: Dr Leslie Myers   afberile    REVIEW OF SYSTEMS:  GEN: no fever,    no chills  RESP: no SOB,   no cough  CVS: no chest pain,   no palpitations  GI: no abdominal pain,   no nausea,   no vomiting,   no constipation,   no diarrhea  : no dysuria,   no frequency  NEURO: no headache,   no dizziness  PSYCH: no depression,   not anxious  Derm : no rash    MEDICATIONS  (STANDING):  aspirin enteric coated 81 milliGRAM(s) Oral daily  dexAMETHasone     Tablet 6 milliGRAM(s) Oral daily  enoxaparin Injectable 40 milliGRAM(s) SubCutaneous daily  furosemide   Injectable 20 milliGRAM(s) IV Push two times a day  metoprolol succinate ER 25 milliGRAM(s) Oral daily  pantoprazole    Tablet 40 milliGRAM(s) Oral before breakfast  remdesivir  IVPB 100 milliGRAM(s) IV Intermittent every 24 hours  remdesivir  IVPB   IV Intermittent     MEDICATIONS  (PRN):  acetaminophen     Tablet .. 650 milliGRAM(s) Oral every 6 hours PRN Temp greater or equal to 38C (100.4F), Mild Pain (1 - 3), Moderate Pain (4 - 6), Severe Pain (7 - 10)      Vital Signs Last 24 Hrs  T(C): 36.3 (24 Jan 2022 05:22), Max: 36.9 (23 Jan 2022 09:05)  T(F): 97.4 (24 Jan 2022 05:22), Max: 98.5 (23 Jan 2022 09:05)  HR: 82 (24 Jan 2022 05:22) (60 - 82)  BP: 135/70 (24 Jan 2022 05:22) (100/58 - 142/74)  BP(mean): --  RR: 18 (24 Jan 2022 05:22) (16 - 18)  SpO2: 98% (24 Jan 2022 05:22) (95% - 100%)  CAPILLARY BLOOD GLUCOSE        I&O's Summary    23 Jan 2022 07:01  -  24 Jan 2022 07:00  --------------------------------------------------------  IN: 1200 mL / OUT: 1800 mL / NET: -600 mL        PHYSICAL EXAM:  HEAD:  Atraumatic, Normocephalic  NECK: Supple, No   JVD  CHEST/LUNG:   no     rales,     no,    rhonchi  HEART: Regular rate and rhythm;         murmur  ABDOMEN: Soft, Nontender, ;   EXTREMITIES:     no   edema  NEUROLOGY:  alert    LABS:                        12.0   8.13  )-----------( 204      ( 24 Jan 2022 07:12 )             37.4     01-23    137  |  97  |  34<H>  ----------------------------<  220<H>  4.9   |  28  |  0.86    Ca    8.6      23 Jan 2022 17:05  Mg     2.0     01-23    TPro  6.1  /  Alb  3.0<L>  /  TBili  0.5  /  DBili  0.1  /  AST  19  /  ALT  12  /  AlkPhos  46  01-23    PT/INR - ( 24 Jan 2022 07:08 )   PT: 13.3 sec;   INR: 1.11 ratio                         Thyroid Stimulating Hormone, Serum: 0.47 uIU/mL (01-21 @ 17:02)          Consultant(s) Notes Reviewed:      Care Discussed with Consultants/Other Providers: